# Patient Record
Sex: MALE | Race: BLACK OR AFRICAN AMERICAN | NOT HISPANIC OR LATINO | Employment: OTHER | URBAN - METROPOLITAN AREA
[De-identification: names, ages, dates, MRNs, and addresses within clinical notes are randomized per-mention and may not be internally consistent; named-entity substitution may affect disease eponyms.]

---

## 2017-01-03 ENCOUNTER — APPOINTMENT (OUTPATIENT)
Dept: RADIATION ONCOLOGY | Facility: HOSPITAL | Age: 52
End: 2017-01-03
Attending: RADIOLOGY
Payer: COMMERCIAL

## 2017-01-03 PROCEDURE — 77336 RADIATION PHYSICS CONSULT: CPT | Performed by: RADIOLOGY

## 2017-01-03 PROCEDURE — 77385 HB NTSTY MODUL RAD TX DLVR SMPL: CPT | Performed by: RADIOLOGY

## 2017-01-04 PROCEDURE — 77385 HB NTSTY MODUL RAD TX DLVR SMPL: CPT | Performed by: RADIOLOGY

## 2017-01-05 PROCEDURE — 77385 HB NTSTY MODUL RAD TX DLVR SMPL: CPT | Performed by: RADIOLOGY

## 2017-01-05 PROCEDURE — 77417 THER RADIOLOGY PORT IMAGE(S): CPT | Performed by: RADIOLOGY

## 2017-01-06 PROCEDURE — 77385 HB NTSTY MODUL RAD TX DLVR SMPL: CPT | Performed by: RADIOLOGY

## 2017-01-09 PROCEDURE — 77336 RADIATION PHYSICS CONSULT: CPT | Performed by: RADIOLOGY

## 2017-01-09 PROCEDURE — 77385 HB NTSTY MODUL RAD TX DLVR SMPL: CPT | Performed by: RADIOLOGY

## 2017-01-23 ENCOUNTER — ALLSCRIPTS OFFICE VISIT (OUTPATIENT)
Dept: OTHER | Facility: OTHER | Age: 52
End: 2017-01-23

## 2017-01-23 DIAGNOSIS — I10 ESSENTIAL (PRIMARY) HYPERTENSION: ICD-10-CM

## 2017-01-23 DIAGNOSIS — E11.9 TYPE 2 DIABETES MELLITUS WITHOUT COMPLICATIONS (HCC): ICD-10-CM

## 2017-02-23 ENCOUNTER — GENERIC CONVERSION - ENCOUNTER (OUTPATIENT)
Dept: OTHER | Facility: OTHER | Age: 52
End: 2017-02-23

## 2017-03-02 ENCOUNTER — APPOINTMENT (OUTPATIENT)
Dept: RADIATION ONCOLOGY | Facility: HOSPITAL | Age: 52
End: 2017-03-02
Attending: RADIOLOGY
Payer: COMMERCIAL

## 2017-03-02 ENCOUNTER — GENERIC CONVERSION - ENCOUNTER (OUTPATIENT)
Dept: OTHER | Facility: OTHER | Age: 52
End: 2017-03-02

## 2017-03-02 DIAGNOSIS — C61 MALIGNANT NEOPLASM OF PROSTATE (HCC): ICD-10-CM

## 2017-03-02 PROCEDURE — 99213 OFFICE O/P EST LOW 20 MIN: CPT | Performed by: RADIOLOGY

## 2017-03-23 ENCOUNTER — APPOINTMENT (OUTPATIENT)
Dept: LAB | Facility: CLINIC | Age: 52
End: 2017-03-23
Payer: COMMERCIAL

## 2017-03-23 ENCOUNTER — ALLSCRIPTS OFFICE VISIT (OUTPATIENT)
Dept: OTHER | Facility: OTHER | Age: 52
End: 2017-03-23

## 2017-03-23 ENCOUNTER — TRANSCRIBE ORDERS (OUTPATIENT)
Dept: LAB | Facility: CLINIC | Age: 52
End: 2017-03-23

## 2017-03-23 DIAGNOSIS — E11.9 TYPE 2 DIABETES MELLITUS WITHOUT COMPLICATIONS (HCC): ICD-10-CM

## 2017-03-23 DIAGNOSIS — I10 ESSENTIAL (PRIMARY) HYPERTENSION: ICD-10-CM

## 2017-03-23 LAB
ANION GAP SERPL CALCULATED.3IONS-SCNC: 11 MMOL/L (ref 4–13)
BUN SERPL-MCNC: 21 MG/DL (ref 5–25)
CALCIUM SERPL-MCNC: 9.1 MG/DL (ref 8.3–10.1)
CHLORIDE SERPL-SCNC: 103 MMOL/L (ref 100–108)
CO2 SERPL-SCNC: 28 MMOL/L (ref 21–32)
CREAT SERPL-MCNC: 1.09 MG/DL (ref 0.6–1.3)
EST. AVERAGE GLUCOSE BLD GHB EST-MCNC: 154 MG/DL
GFR SERPL CREATININE-BSD FRML MDRD: >60 ML/MIN/1.73SQ M
GLUCOSE P FAST SERPL-MCNC: 100 MG/DL (ref 65–99)
HBA1C MFR BLD: 7 % (ref 4.2–6.3)
POTASSIUM SERPL-SCNC: 3.6 MMOL/L (ref 3.5–5.3)
SODIUM SERPL-SCNC: 142 MMOL/L (ref 136–145)

## 2017-03-23 PROCEDURE — 83036 HEMOGLOBIN GLYCOSYLATED A1C: CPT

## 2017-03-23 PROCEDURE — 36415 COLL VENOUS BLD VENIPUNCTURE: CPT

## 2017-03-23 PROCEDURE — 80048 BASIC METABOLIC PNL TOTAL CA: CPT

## 2017-03-24 ENCOUNTER — GENERIC CONVERSION - ENCOUNTER (OUTPATIENT)
Dept: OTHER | Facility: OTHER | Age: 52
End: 2017-03-24

## 2017-03-30 ENCOUNTER — GENERIC CONVERSION - ENCOUNTER (OUTPATIENT)
Dept: OTHER | Facility: OTHER | Age: 52
End: 2017-03-30

## 2017-04-05 ENCOUNTER — ALLSCRIPTS OFFICE VISIT (OUTPATIENT)
Dept: OTHER | Facility: OTHER | Age: 52
End: 2017-04-05

## 2017-04-05 LAB
CLARITY UR: NORMAL
COLOR UR: YELLOW
GLUCOSE (HISTORICAL): NORMAL
HGB UR QL STRIP.AUTO: NORMAL
KETONES UR STRIP-MCNC: NORMAL MG/DL
LEUKOCYTE ESTERASE UR QL STRIP: NORMAL
NITRITE UR QL STRIP: NORMAL
PH UR STRIP.AUTO: 5 [PH]
PROT UR STRIP-MCNC: NORMAL MG/DL
SP GR UR STRIP.AUTO: 1

## 2017-04-10 ENCOUNTER — APPOINTMENT (OUTPATIENT)
Dept: LAB | Facility: HOSPITAL | Age: 52
End: 2017-04-10
Payer: COMMERCIAL

## 2017-04-10 ENCOUNTER — TRANSCRIBE ORDERS (OUTPATIENT)
Dept: ADMINISTRATIVE | Facility: HOSPITAL | Age: 52
End: 2017-04-10

## 2017-04-10 DIAGNOSIS — K62.5 HEMORRHAGE OF RECTUM AND ANUS: ICD-10-CM

## 2017-04-10 DIAGNOSIS — K62.5 HEMORRHAGE OF RECTUM AND ANUS: Primary | ICD-10-CM

## 2017-04-10 PROCEDURE — 87338 HPYLORI STOOL AG IA: CPT

## 2017-04-11 LAB — H PYLORI AG STL QL IA: POSITIVE

## 2017-04-24 ENCOUNTER — GENERIC CONVERSION - ENCOUNTER (OUTPATIENT)
Dept: OTHER | Facility: OTHER | Age: 52
End: 2017-04-24

## 2017-05-04 ENCOUNTER — GENERIC CONVERSION - ENCOUNTER (OUTPATIENT)
Dept: OTHER | Facility: OTHER | Age: 52
End: 2017-05-04

## 2017-06-08 ENCOUNTER — ANESTHESIA EVENT (OUTPATIENT)
Dept: PERIOP | Facility: HOSPITAL | Age: 52
DRG: 621 | End: 2017-06-08
Payer: COMMERCIAL

## 2017-06-08 ENCOUNTER — ALLSCRIPTS OFFICE VISIT (OUTPATIENT)
Dept: OTHER | Facility: OTHER | Age: 52
End: 2017-06-08

## 2017-06-20 ENCOUNTER — ANESTHESIA (OUTPATIENT)
Dept: PERIOP | Facility: HOSPITAL | Age: 52
DRG: 621 | End: 2017-06-20
Payer: COMMERCIAL

## 2017-06-20 ENCOUNTER — HOSPITAL ENCOUNTER (INPATIENT)
Facility: HOSPITAL | Age: 52
LOS: 1 days | Discharge: HOME | DRG: 621 | End: 2017-06-21
Attending: SURGERY | Admitting: SURGERY
Payer: COMMERCIAL

## 2017-06-20 DIAGNOSIS — G47.33 OBSTRUCTIVE SLEEP APNEA: ICD-10-CM

## 2017-06-20 DIAGNOSIS — E66.01 MORBID (SEVERE) OBESITY DUE TO EXCESS CALORIES (HCC): ICD-10-CM

## 2017-06-20 LAB
GLUCOSE SERPL-MCNC: 170 MG/DL (ref 65–140)
GLUCOSE SERPL-MCNC: 90 MG/DL (ref 65–140)

## 2017-06-20 PROCEDURE — 88307 TISSUE EXAM BY PATHOLOGIST: CPT | Performed by: SURGERY

## 2017-06-20 PROCEDURE — 88342 IMHCHEM/IMCYTCHM 1ST ANTB: CPT | Performed by: SURGERY

## 2017-06-20 PROCEDURE — 82948 REAGENT STRIP/BLOOD GLUCOSE: CPT

## 2017-06-20 PROCEDURE — 0DB64Z3 EXCISION OF STOMACH, PERCUTANEOUS ENDOSCOPIC APPROACH, VERTICAL: ICD-10-PCS | Performed by: SURGERY

## 2017-06-20 RX ORDER — OXYCODONE HCL 5 MG/5 ML
10 SOLUTION, ORAL ORAL EVERY 4 HOURS PRN
Status: DISCONTINUED | OUTPATIENT
Start: 2017-06-20 | End: 2017-06-21 | Stop reason: HOSPADM

## 2017-06-20 RX ORDER — ONDANSETRON 2 MG/ML
4 INJECTION INTRAMUSCULAR; INTRAVENOUS ONCE AS NEEDED
Status: COMPLETED | OUTPATIENT
Start: 2017-06-20 | End: 2017-06-20

## 2017-06-20 RX ORDER — ROCURONIUM BROMIDE 10 MG/ML
INJECTION, SOLUTION INTRAVENOUS AS NEEDED
Status: DISCONTINUED | OUTPATIENT
Start: 2017-06-20 | End: 2017-06-20 | Stop reason: SURG

## 2017-06-20 RX ORDER — METOCLOPRAMIDE HYDROCHLORIDE 5 MG/ML
10 INJECTION INTRAMUSCULAR; INTRAVENOUS EVERY 6 HOURS SCHEDULED
Status: DISCONTINUED | OUTPATIENT
Start: 2017-06-20 | End: 2017-06-21 | Stop reason: HOSPADM

## 2017-06-20 RX ORDER — ACETAMINOPHEN 160 MG/5ML
325 SUSPENSION, ORAL (FINAL DOSE FORM) ORAL EVERY 4 HOURS PRN
Status: DISCONTINUED | OUTPATIENT
Start: 2017-06-20 | End: 2017-06-21 | Stop reason: HOSPADM

## 2017-06-20 RX ORDER — SODIUM CHLORIDE 9 MG/ML
125 INJECTION, SOLUTION INTRAVENOUS CONTINUOUS
Status: DISCONTINUED | OUTPATIENT
Start: 2017-06-20 | End: 2017-06-20 | Stop reason: HOSPADM

## 2017-06-20 RX ORDER — PROPOFOL 10 MG/ML
INJECTION, EMULSION INTRAVENOUS AS NEEDED
Status: DISCONTINUED | OUTPATIENT
Start: 2017-06-20 | End: 2017-06-20 | Stop reason: SURG

## 2017-06-20 RX ORDER — MAGNESIUM HYDROXIDE 1200 MG/15ML
LIQUID ORAL AS NEEDED
Status: DISCONTINUED | OUTPATIENT
Start: 2017-06-20 | End: 2017-06-20 | Stop reason: HOSPADM

## 2017-06-20 RX ORDER — GLYCOPYRROLATE 0.2 MG/ML
INJECTION INTRAMUSCULAR; INTRAVENOUS AS NEEDED
Status: DISCONTINUED | OUTPATIENT
Start: 2017-06-20 | End: 2017-06-20 | Stop reason: SURG

## 2017-06-20 RX ORDER — DEXAMETHASONE SODIUM PHOSPHATE 4 MG/ML
INJECTION, SOLUTION INTRA-ARTICULAR; INTRALESIONAL; INTRAMUSCULAR; INTRAVENOUS; SOFT TISSUE AS NEEDED
Status: DISCONTINUED | OUTPATIENT
Start: 2017-06-20 | End: 2017-06-20 | Stop reason: SURG

## 2017-06-20 RX ORDER — HYDROMORPHONE HYDROCHLORIDE 2 MG/ML
INJECTION, SOLUTION INTRAMUSCULAR; INTRAVENOUS; SUBCUTANEOUS AS NEEDED
Status: DISCONTINUED | OUTPATIENT
Start: 2017-06-20 | End: 2017-06-20 | Stop reason: SURG

## 2017-06-20 RX ORDER — SCOLOPAMINE TRANSDERMAL SYSTEM 1 MG/1
1 PATCH, EXTENDED RELEASE TRANSDERMAL ONCE AS NEEDED
Status: DISCONTINUED | OUTPATIENT
Start: 2017-06-20 | End: 2017-06-20

## 2017-06-20 RX ORDER — ONDANSETRON 2 MG/ML
INJECTION INTRAMUSCULAR; INTRAVENOUS AS NEEDED
Status: DISCONTINUED | OUTPATIENT
Start: 2017-06-20 | End: 2017-06-20 | Stop reason: SURG

## 2017-06-20 RX ORDER — PANTOPRAZOLE SODIUM 40 MG/1
40 INJECTION, POWDER, FOR SOLUTION INTRAVENOUS
Status: DISCONTINUED | OUTPATIENT
Start: 2017-06-21 | End: 2017-06-21 | Stop reason: HOSPADM

## 2017-06-20 RX ORDER — ONDANSETRON 2 MG/ML
4 INJECTION INTRAMUSCULAR; INTRAVENOUS EVERY 4 HOURS PRN
Status: DISCONTINUED | OUTPATIENT
Start: 2017-06-20 | End: 2017-06-21 | Stop reason: HOSPADM

## 2017-06-20 RX ORDER — TAMSULOSIN HYDROCHLORIDE 0.4 MG/1
0.4 CAPSULE ORAL
COMMUNITY
End: 2018-05-29 | Stop reason: SDUPTHER

## 2017-06-20 RX ORDER — BUPIVACAINE HYDROCHLORIDE AND EPINEPHRINE 5; 5 MG/ML; UG/ML
INJECTION, SOLUTION PERINEURAL AS NEEDED
Status: DISCONTINUED | OUTPATIENT
Start: 2017-06-20 | End: 2017-06-20 | Stop reason: HOSPADM

## 2017-06-20 RX ORDER — SODIUM CHLORIDE, SODIUM LACTATE, POTASSIUM CHLORIDE, CALCIUM CHLORIDE 600; 310; 30; 20 MG/100ML; MG/100ML; MG/100ML; MG/100ML
125 INJECTION, SOLUTION INTRAVENOUS CONTINUOUS
Status: DISCONTINUED | OUTPATIENT
Start: 2017-06-20 | End: 2017-06-21 | Stop reason: HOSPADM

## 2017-06-20 RX ORDER — ACETAMINOPHEN 160 MG/5ML
320 SUSPENSION, ORAL (FINAL DOSE FORM) ORAL EVERY 4 HOURS PRN
Status: DISCONTINUED | OUTPATIENT
Start: 2017-06-20 | End: 2017-06-21 | Stop reason: HOSPADM

## 2017-06-20 RX ORDER — MORPHINE SULFATE 4 MG/ML
4 INJECTION, SOLUTION INTRAMUSCULAR; INTRAVENOUS EVERY 2 HOUR PRN
Status: DISCONTINUED | OUTPATIENT
Start: 2017-06-20 | End: 2017-06-21 | Stop reason: HOSPADM

## 2017-06-20 RX ORDER — LANSOPRAZOLE 30 MG/1
30 CAPSULE, DELAYED RELEASE ORAL DAILY
COMMUNITY
End: 2017-06-21 | Stop reason: HOSPADM

## 2017-06-20 RX ORDER — MORPHINE SULFATE 2 MG/ML
2 INJECTION, SOLUTION INTRAMUSCULAR; INTRAVENOUS EVERY 2 HOUR PRN
Status: DISCONTINUED | OUTPATIENT
Start: 2017-06-20 | End: 2017-06-21 | Stop reason: HOSPADM

## 2017-06-20 RX ORDER — PROMETHAZINE HYDROCHLORIDE 25 MG/ML
25 INJECTION, SOLUTION INTRAMUSCULAR; INTRAVENOUS EVERY 6 HOURS PRN
Status: DISCONTINUED | OUTPATIENT
Start: 2017-06-20 | End: 2017-06-21 | Stop reason: HOSPADM

## 2017-06-20 RX ORDER — OXYCODONE HCL 5 MG/5 ML
5 SOLUTION, ORAL ORAL EVERY 4 HOURS PRN
Status: DISCONTINUED | OUTPATIENT
Start: 2017-06-20 | End: 2017-06-21 | Stop reason: HOSPADM

## 2017-06-20 RX ORDER — FENTANYL CITRATE 50 UG/ML
INJECTION, SOLUTION INTRAMUSCULAR; INTRAVENOUS AS NEEDED
Status: DISCONTINUED | OUTPATIENT
Start: 2017-06-20 | End: 2017-06-20 | Stop reason: SURG

## 2017-06-20 RX ADMIN — DEXAMETHASONE SODIUM PHOSPHATE 8 MG: 4 INJECTION, SOLUTION INTRAMUSCULAR; INTRAVENOUS at 12:47

## 2017-06-20 RX ADMIN — OXYCODONE HYDROCHLORIDE 10 MG: 5 SOLUTION ORAL at 23:15

## 2017-06-20 RX ADMIN — ONDANSETRON 4 MG: 2 INJECTION INTRAMUSCULAR; INTRAVENOUS at 20:10

## 2017-06-20 RX ADMIN — ROCURONIUM BROMIDE 10 MG: 10 INJECTION, SOLUTION INTRAVENOUS at 13:12

## 2017-06-20 RX ADMIN — TRIMETHOBENZAMIDE HYDROCHLORIDE 200 MG: 100 INJECTION INTRAMUSCULAR at 15:18

## 2017-06-20 RX ADMIN — FENTANYL CITRATE 50 MCG: 50 INJECTION, SOLUTION INTRAMUSCULAR; INTRAVENOUS at 13:06

## 2017-06-20 RX ADMIN — GLYCOPYRROLATE 0.6 MG: 0.2 INJECTION, SOLUTION INTRAMUSCULAR; INTRAVENOUS at 13:59

## 2017-06-20 RX ADMIN — HYDROMORPHONE HYDROCHLORIDE 0.5 MG: 2 INJECTION, SOLUTION INTRAMUSCULAR; INTRAVENOUS; SUBCUTANEOUS at 14:15

## 2017-06-20 RX ADMIN — LIDOCAINE HYDROCHLORIDE 100 MG: 20 INJECTION, SOLUTION INTRAVENOUS at 12:38

## 2017-06-20 RX ADMIN — SODIUM CHLORIDE: 0.9 INJECTION, SOLUTION INTRAVENOUS at 13:44

## 2017-06-20 RX ADMIN — HYDROMORPHONE HYDROCHLORIDE 0.5 MG: 2 INJECTION, SOLUTION INTRAMUSCULAR; INTRAVENOUS; SUBCUTANEOUS at 13:48

## 2017-06-20 RX ADMIN — HYDROMORPHONE HYDROCHLORIDE 0.5 MG: 1 INJECTION, SOLUTION INTRAMUSCULAR; INTRAVENOUS; SUBCUTANEOUS at 15:09

## 2017-06-20 RX ADMIN — SODIUM CHLORIDE 125 ML/HR: 0.9 INJECTION, SOLUTION INTRAVENOUS at 11:42

## 2017-06-20 RX ADMIN — ROCURONIUM BROMIDE 50 MG: 10 INJECTION, SOLUTION INTRAVENOUS at 12:38

## 2017-06-20 RX ADMIN — SCOPALAMINE 1 PATCH: 1 PATCH, EXTENDED RELEASE TRANSDERMAL at 11:42

## 2017-06-20 RX ADMIN — SODIUM CHLORIDE, SODIUM LACTATE, POTASSIUM CHLORIDE, AND CALCIUM CHLORIDE 125 ML/HR: .6; .31; .03; .02 INJECTION, SOLUTION INTRAVENOUS at 15:22

## 2017-06-20 RX ADMIN — PROPOFOL 200 MG: 10 INJECTION, EMULSION INTRAVENOUS at 12:38

## 2017-06-20 RX ADMIN — ACETAMINOPHEN 325 MG: 160 SUSPENSION ORAL at 23:47

## 2017-06-20 RX ADMIN — FENTANYL CITRATE 50 MCG: 50 INJECTION, SOLUTION INTRAMUSCULAR; INTRAVENOUS at 13:12

## 2017-06-20 RX ADMIN — NEOSTIGMINE METHYLSULFATE 4 MG: 1 INJECTION, SOLUTION INTRAMUSCULAR; INTRAVENOUS; SUBCUTANEOUS at 13:59

## 2017-06-20 RX ADMIN — MORPHINE SULFATE 4 MG: 4 INJECTION, SOLUTION INTRAMUSCULAR; INTRAVENOUS at 16:30

## 2017-06-20 RX ADMIN — FENTANYL CITRATE 100 MCG: 50 INJECTION, SOLUTION INTRAMUSCULAR; INTRAVENOUS at 12:38

## 2017-06-20 RX ADMIN — ONDANSETRON 4 MG: 2 INJECTION INTRAMUSCULAR; INTRAVENOUS at 14:53

## 2017-06-20 RX ADMIN — CEFAZOLIN SODIUM 2000 MG: 2 SOLUTION INTRAVENOUS at 12:47

## 2017-06-20 RX ADMIN — ENOXAPARIN SODIUM 40 MG: 40 INJECTION SUBCUTANEOUS at 12:01

## 2017-06-20 RX ADMIN — SODIUM CHLORIDE: 0.9 INJECTION, SOLUTION INTRAVENOUS at 13:05

## 2017-06-20 RX ADMIN — SODIUM CHLORIDE: 0.9 INJECTION, SOLUTION INTRAVENOUS at 14:20

## 2017-06-20 RX ADMIN — MORPHINE SULFATE 4 MG: 4 INJECTION, SOLUTION INTRAMUSCULAR; INTRAVENOUS at 20:17

## 2017-06-20 RX ADMIN — HYDROMORPHONE HYDROCHLORIDE 0.5 MG: 1 INJECTION, SOLUTION INTRAMUSCULAR; INTRAVENOUS; SUBCUTANEOUS at 14:38

## 2017-06-20 RX ADMIN — METOCLOPRAMIDE 10 MG: 5 INJECTION, SOLUTION INTRAMUSCULAR; INTRAVENOUS at 23:15

## 2017-06-20 RX ADMIN — ONDANSETRON HYDROCHLORIDE 4 MG: 2 INJECTION, SOLUTION INTRAVENOUS at 12:47

## 2017-06-20 RX ADMIN — HYDROMORPHONE HYDROCHLORIDE 0.5 MG: 1 INJECTION, SOLUTION INTRAMUSCULAR; INTRAVENOUS; SUBCUTANEOUS at 14:51

## 2017-06-20 RX ADMIN — PROMETHAZINE HYDROCHLORIDE 25 MG: 25 INJECTION INTRAMUSCULAR; INTRAVENOUS at 16:29

## 2017-06-21 VITALS
HEIGHT: 70 IN | BODY MASS INDEX: 42.56 KG/M2 | OXYGEN SATURATION: 95 % | WEIGHT: 297.3 LBS | SYSTOLIC BLOOD PRESSURE: 126 MMHG | DIASTOLIC BLOOD PRESSURE: 76 MMHG | RESPIRATION RATE: 16 BRPM | TEMPERATURE: 98.6 F | HEART RATE: 75 BPM

## 2017-06-21 PROBLEM — E66.9 OBESITY: Status: ACTIVE | Noted: 2017-06-21

## 2017-06-21 LAB
ANION GAP SERPL CALCULATED.3IONS-SCNC: 8 MMOL/L (ref 4–13)
BUN SERPL-MCNC: 10 MG/DL (ref 5–25)
CALCIUM SERPL-MCNC: 8.4 MG/DL (ref 8.3–10.1)
CHLORIDE SERPL-SCNC: 103 MMOL/L (ref 100–108)
CO2 SERPL-SCNC: 27 MMOL/L (ref 21–32)
CREAT SERPL-MCNC: 1.04 MG/DL (ref 0.6–1.3)
ERYTHROCYTE [DISTWIDTH] IN BLOOD BY AUTOMATED COUNT: 13.8 % (ref 11.6–15.1)
GFR SERPL CREATININE-BSD FRML MDRD: >60 ML/MIN/1.73SQ M
GLUCOSE SERPL-MCNC: 131 MG/DL (ref 65–140)
HCT VFR BLD AUTO: 39.5 % (ref 36.5–49.3)
HGB BLD-MCNC: 12.7 G/DL (ref 12–17)
MCH RBC QN AUTO: 28.7 PG (ref 26.8–34.3)
MCHC RBC AUTO-ENTMCNC: 32.2 G/DL (ref 31.4–37.4)
MCV RBC AUTO: 89 FL (ref 82–98)
PLATELET # BLD AUTO: 231 THOUSANDS/UL (ref 149–390)
PMV BLD AUTO: 10.8 FL (ref 8.9–12.7)
POTASSIUM SERPL-SCNC: 4.3 MMOL/L (ref 3.5–5.3)
RBC # BLD AUTO: 4.43 MILLION/UL (ref 3.88–5.62)
SODIUM SERPL-SCNC: 138 MMOL/L (ref 136–145)
WBC # BLD AUTO: 8.13 THOUSAND/UL (ref 4.31–10.16)

## 2017-06-21 PROCEDURE — 80048 BASIC METABOLIC PNL TOTAL CA: CPT | Performed by: SURGERY

## 2017-06-21 PROCEDURE — C9113 INJ PANTOPRAZOLE SODIUM, VIA: HCPCS | Performed by: SURGERY

## 2017-06-21 PROCEDURE — 85027 COMPLETE CBC AUTOMATED: CPT | Performed by: SURGERY

## 2017-06-21 RX ORDER — OMEPRAZOLE 20 MG/1
20 CAPSULE, DELAYED RELEASE ORAL DAILY
Qty: 30 CAPSULE | Refills: 0 | Status: SHIPPED | OUTPATIENT
Start: 2017-06-21 | End: 2019-04-04 | Stop reason: ALTCHOICE

## 2017-06-21 RX ORDER — OXYCODONE HYDROCHLORIDE AND ACETAMINOPHEN 5; 325 MG/1; MG/1
1 TABLET ORAL EVERY 4 HOURS PRN
Qty: 20 TABLET | Refills: 0
Start: 2017-06-21 | End: 2017-07-01

## 2017-06-21 RX ADMIN — SODIUM CHLORIDE, SODIUM LACTATE, POTASSIUM CHLORIDE, AND CALCIUM CHLORIDE 125 ML/HR: .6; .31; .03; .02 INJECTION, SOLUTION INTRAVENOUS at 09:16

## 2017-06-21 RX ADMIN — ENOXAPARIN SODIUM 40 MG: 40 INJECTION SUBCUTANEOUS at 09:00

## 2017-06-21 RX ADMIN — OXYCODONE HYDROCHLORIDE 10 MG: 5 SOLUTION ORAL at 03:25

## 2017-06-21 RX ADMIN — ACETAMINOPHEN 325 MG: 160 SUSPENSION ORAL at 03:48

## 2017-06-21 RX ADMIN — ACETAMINOPHEN 325 MG: 160 SUSPENSION ORAL at 09:01

## 2017-06-21 RX ADMIN — OXYCODONE HYDROCHLORIDE 5 MG: 5 SOLUTION ORAL at 09:01

## 2017-06-21 RX ADMIN — METOCLOPRAMIDE 10 MG: 5 INJECTION, SOLUTION INTRAMUSCULAR; INTRAVENOUS at 05:31

## 2017-06-21 RX ADMIN — PANTOPRAZOLE SODIUM 40 MG: 40 INJECTION, POWDER, FOR SOLUTION INTRAVENOUS at 09:02

## 2017-06-23 ENCOUNTER — GENERIC CONVERSION - ENCOUNTER (OUTPATIENT)
Dept: OTHER | Facility: OTHER | Age: 52
End: 2017-06-23

## 2017-06-29 ENCOUNTER — GENERIC CONVERSION - ENCOUNTER (OUTPATIENT)
Dept: OTHER | Facility: OTHER | Age: 52
End: 2017-06-29

## 2017-06-29 ENCOUNTER — ALLSCRIPTS OFFICE VISIT (OUTPATIENT)
Dept: OTHER | Facility: OTHER | Age: 52
End: 2017-06-29

## 2017-07-27 ENCOUNTER — ALLSCRIPTS OFFICE VISIT (OUTPATIENT)
Dept: OTHER | Facility: OTHER | Age: 52
End: 2017-07-27

## 2017-08-02 ENCOUNTER — GENERIC CONVERSION - ENCOUNTER (OUTPATIENT)
Dept: OTHER | Facility: OTHER | Age: 52
End: 2017-08-02

## 2017-10-03 ENCOUNTER — ALLSCRIPTS OFFICE VISIT (OUTPATIENT)
Dept: OTHER | Facility: OTHER | Age: 52
End: 2017-10-03

## 2017-10-03 DIAGNOSIS — K91.2 POSTSURGICAL MALABSORPTION, NOT ELSEWHERE CLASSIFIED (CODE): ICD-10-CM

## 2017-10-03 DIAGNOSIS — E11.9 TYPE 2 DIABETES MELLITUS WITHOUT COMPLICATIONS (HCC): ICD-10-CM

## 2017-10-03 DIAGNOSIS — Z98.84 BARIATRIC SURGERY STATUS: ICD-10-CM

## 2017-10-03 DIAGNOSIS — K21.9 GASTRO-ESOPHAGEAL REFLUX DISEASE WITHOUT ESOPHAGITIS: ICD-10-CM

## 2017-10-03 DIAGNOSIS — E66.9 OBESITY: ICD-10-CM

## 2017-10-03 DIAGNOSIS — E78.1 PURE HYPERGLYCERIDEMIA: ICD-10-CM

## 2017-10-03 DIAGNOSIS — A04.8 OTHER SPECIFIED BACTERIAL INTESTINAL INFECTIONS: ICD-10-CM

## 2017-10-03 DIAGNOSIS — I10 ESSENTIAL (PRIMARY) HYPERTENSION: ICD-10-CM

## 2017-10-04 ENCOUNTER — APPOINTMENT (OUTPATIENT)
Dept: LAB | Facility: HOSPITAL | Age: 52
End: 2017-10-04
Attending: UROLOGY
Payer: COMMERCIAL

## 2017-10-04 DIAGNOSIS — A04.8 OTHER SPECIFIED BACTERIAL INTESTINAL INFECTIONS: ICD-10-CM

## 2017-10-04 DIAGNOSIS — K91.2 POSTSURGICAL MALABSORPTION, NOT ELSEWHERE CLASSIFIED (CODE): ICD-10-CM

## 2017-10-04 DIAGNOSIS — K21.9 GASTRO-ESOPHAGEAL REFLUX DISEASE WITHOUT ESOPHAGITIS: ICD-10-CM

## 2017-10-04 DIAGNOSIS — E78.1 PURE HYPERGLYCERIDEMIA: ICD-10-CM

## 2017-10-04 DIAGNOSIS — Z98.84 BARIATRIC SURGERY STATUS: ICD-10-CM

## 2017-10-04 DIAGNOSIS — E11.9 TYPE 2 DIABETES MELLITUS WITHOUT COMPLICATIONS (HCC): ICD-10-CM

## 2017-10-04 DIAGNOSIS — I10 ESSENTIAL (PRIMARY) HYPERTENSION: ICD-10-CM

## 2017-10-04 DIAGNOSIS — C61 MALIGNANT NEOPLASM OF PROSTATE (HCC): ICD-10-CM

## 2017-10-04 DIAGNOSIS — E66.9 OBESITY: ICD-10-CM

## 2017-10-04 LAB
25(OH)D3 SERPL-MCNC: 28.5 NG/ML (ref 30–100)
ALBUMIN SERPL BCP-MCNC: 4.1 G/DL (ref 3.5–5)
ALP SERPL-CCNC: 46 U/L (ref 46–116)
ALT SERPL W P-5'-P-CCNC: 39 U/L (ref 12–78)
ANION GAP SERPL CALCULATED.3IONS-SCNC: 8 MMOL/L (ref 4–13)
AST SERPL W P-5'-P-CCNC: 21 U/L (ref 5–45)
BILIRUB SERPL-MCNC: 0.4 MG/DL (ref 0.2–1)
BUN SERPL-MCNC: 15 MG/DL (ref 5–25)
CALCIUM SERPL-MCNC: 9.1 MG/DL (ref 8.3–10.1)
CHLORIDE SERPL-SCNC: 108 MMOL/L (ref 100–108)
CHOLEST SERPL-MCNC: 131 MG/DL (ref 50–200)
CO2 SERPL-SCNC: 29 MMOL/L (ref 21–32)
CREAT SERPL-MCNC: 1.04 MG/DL (ref 0.6–1.3)
ERYTHROCYTE [DISTWIDTH] IN BLOOD BY AUTOMATED COUNT: 13.2 % (ref 11.6–15.1)
FERRITIN SERPL-MCNC: 86 NG/ML (ref 8–388)
FOLATE SERPL-MCNC: >20 NG/ML (ref 3.1–17.5)
GFR SERPL CREATININE-BSD FRML MDRD: 96 ML/MIN/1.73SQ M
GLUCOSE P FAST SERPL-MCNC: 92 MG/DL (ref 65–99)
HCT VFR BLD AUTO: 40.7 % (ref 36.5–49.3)
HDLC SERPL-MCNC: 47 MG/DL (ref 40–60)
HGB BLD-MCNC: 13.1 G/DL (ref 12–17)
LDLC SERPL CALC-MCNC: 73 MG/DL (ref 0–100)
MCH RBC QN AUTO: 28.1 PG (ref 26.8–34.3)
MCHC RBC AUTO-ENTMCNC: 32.2 G/DL (ref 31.4–37.4)
MCV RBC AUTO: 87 FL (ref 82–98)
PLATELET # BLD AUTO: 172 THOUSANDS/UL (ref 149–390)
PMV BLD AUTO: 11.2 FL (ref 8.9–12.7)
POTASSIUM SERPL-SCNC: 3.4 MMOL/L (ref 3.5–5.3)
PROT SERPL-MCNC: 7.8 G/DL (ref 6.4–8.2)
PSA SERPL-MCNC: 0.6 NG/ML (ref 0–4)
PTH-INTACT SERPL-MCNC: 23 PG/ML (ref 14–72)
RBC # BLD AUTO: 4.66 MILLION/UL (ref 3.88–5.62)
SODIUM SERPL-SCNC: 145 MMOL/L (ref 136–145)
TRIGL SERPL-MCNC: 54 MG/DL
VIT B12 SERPL-MCNC: 717 PG/ML (ref 100–900)
WBC # BLD AUTO: 2.54 THOUSAND/UL (ref 4.31–10.16)

## 2017-10-04 PROCEDURE — 84153 ASSAY OF PSA TOTAL: CPT

## 2017-10-04 PROCEDURE — 82728 ASSAY OF FERRITIN: CPT

## 2017-10-04 PROCEDURE — 82607 VITAMIN B-12: CPT

## 2017-10-04 PROCEDURE — 84590 ASSAY OF VITAMIN A: CPT

## 2017-10-04 PROCEDURE — 85027 COMPLETE CBC AUTOMATED: CPT

## 2017-10-04 PROCEDURE — 80061 LIPID PANEL: CPT

## 2017-10-04 PROCEDURE — 83970 ASSAY OF PARATHORMONE: CPT

## 2017-10-04 PROCEDURE — 84425 ASSAY OF VITAMIN B-1: CPT

## 2017-10-04 PROCEDURE — 80053 COMPREHEN METABOLIC PANEL: CPT

## 2017-10-04 PROCEDURE — 82306 VITAMIN D 25 HYDROXY: CPT

## 2017-10-04 PROCEDURE — 36415 COLL VENOUS BLD VENIPUNCTURE: CPT

## 2017-10-04 PROCEDURE — 82746 ASSAY OF FOLIC ACID SERUM: CPT

## 2017-10-04 NOTE — PROGRESS NOTES
Assessment  1  Status post gastric bypass for obesity (V45 86) (Z98 84)   2  Postgastrectomy malabsorption (579 3) (K91 2,Z90 3)   3  Obesity (BMI 30-39 9) (278 00) (E66 9)   4  Sleep apnea (780 57) (G47 30)   5  Hypertriglyceridemia (272 1) (E78 1)   6  Diabetes mellitus (250 00) (E11 9)   7  Benign essential hypertension (401 1) (I10)   8  Acid reflux (530 81) (K21 9)   9  Helicobacter pylori (H  pylori) infection (041 86) (A04 8)   10  Other constipation (564 09) (K59 09)    Plan  Acid reflux, Benign essential hypertension, Diabetes mellitus, Helicobacter pylori (H   pylori) infection, Hypertriglyceridemia, Obesity (BMI 30-39 9), Postgastrectomy  malabsorption, Status post gastric bypass for obesity    · (1) CBC/ PLT (NO DIFF); Status:Active; Requested CPH:[id number];    Perform:[facility]; OXZ:[id number]; Ordered; For:Acid reflux, Benign essential hypertension, Diabetes mellitus, Helicobacter pylori (H  pylori) infection, Hypertriglyceridemia, Obesity (BMI 30-39 9), Postgastrectomy malabsorption, Status post gastric bypass for obesity; Ordered By:Darleen Mchugh;   · (1) COMPREHENSIVE METABOLIC PANEL; Status:Active; Requested [id number]:[id number];    Perform:[facility]; FZF:[id number]; Ordered; For:Acid reflux, Benign essential hypertension, Diabetes mellitus, Helicobacter pylori (H  pylori) infection, Hypertriglyceridemia, Obesity (BMI 30-39 9), Postgastrectomy malabsorption, Status post gastric bypass for obesity; Ordered By:Darleen Mchugh;   · (1) FERRITIN; Status:Active; Requested [id number]:[id number];    Perform:[facility]; SWJ:[id number]; Ordered; For:Acid reflux, Benign essential hypertension, Diabetes mellitus, Helicobacter pylori (H  pylori) infection, Hypertriglyceridemia, Obesity (BMI 30-39 9), Postgastrectomy malabsorption, Status post gastric bypass for obesity; Ordered By:Darleen Mchugh;   · (1) FOLATE; Status:Active;  Requested [id number];    Perform:[facility]; Due:2018; Ordered; For:Acid reflux, Benign essential hypertension, Diabetes mellitus, Helicobacter pylori (H  pylori) infection, Hypertriglyceridemia, Obesity (BMI 30-39 9), Postgastrectomy malabsorption, Status post gastric bypass for obesity; Ordered By:Chely Mchugh;   · (1) HEMOGLOBIN A1C; Status:Active; Requested HTH:[id number];    Perform:[facility]; OAC:[id number]; Ordered; For:Acid reflux, Benign essential hypertension, Diabetes mellitus, Helicobacter pylori (H  pylori) infection, Hypertriglyceridemia, Obesity (BMI 30-39 9), Postgastrectomy malabsorption, Status post gastric bypass for obesity; Ordered By:Gianfranco Mchugh;   · (1) LIPID PANEL, FASTING; Status:Active; Requested THV:[id number];    Perform:[facility]; [id number]; Ordered; For:Acid reflux, Benign essential hypertension, Diabetes mellitus, Helicobacter pylori (H  pylori) infection, Hypertriglyceridemia, Obesity (BMI 30-39 9), Postgastrectomy malabsorption, Status post gastric bypass for obesity; Ordered By:Chely Mchugh;   · (1) PTH N-TERMINAL (INTACT); Status:Active; Requested ZKD:[id number];    Perform:[facility]; UIR:[id number]; Ordered; For:Acid reflux, Benign essential hypertension, Diabetes mellitus, Helicobacter pylori (H  pylori) infection, Hypertriglyceridemia, Obesity (BMI 30-39 9), Postgastrectomy malabsorption, Status post gastric bypass for obesity; Ordered By:Gianfranco Mchugh;   · (1) VITAMIN A; Status:Active; Requested [id number]:[id number];    Perform:[facility]; DRN:[id number]; Ordered; For:Acid reflux, Benign essential hypertension, Diabetes mellitus, Helicobacter pylori (H  pylori) infection, Hypertriglyceridemia, Obesity (BMI 30-39 9), Postgastrectomy malabsorption, Status post gastric bypass for obesity; Ordered By:Chely Mchugh;   · (1) VITAMIN B1, WHOLE BLOOD; Status:Active; Requested EJI:[id number];    Perform:[facility]; JANET:[id number]; Ordered; For:Acid reflux, Benign essential hypertension, Diabetes mellitus, Helicobacter pylori (H  pylori) infection, Hypertriglyceridemia, Obesity (BMI 30-39 9), Postgastrectomy malabsorption, Status post gastric bypass for obesity; Ordered By:An Mchugh;   · (1) VITAMIN B12; Status:Active; Requested XPS:[id number];    Perform:[facility]; C:[id number]; Ordered; For:Acid reflux, Benign essential hypertension, Diabetes mellitus, Helicobacter pylori (H  pylori) infection, Hypertriglyceridemia, Obesity (BMI 30-39 9), Postgastrectomy malabsorption, Status post gastric bypass for obesity; Ordered By:An Mchugh;   · (1) VITAMIN D 25-HYDROXY; Status:Active; Requested ZXK:[id number];    Perform:[facility]; YXK:[id number]; Ordered; For:Acid reflux, Benign essential hypertension, Diabetes mellitus, Helicobacter pylori (H  pylori) infection, Hypertriglyceridemia, Obesity (BMI 30-39 9), Postgastrectomy malabsorption, Status post gastric bypass for obesity; Ordered By:An Mchugh;    Discussion/Summary    Follow up in 3 months  Follow diet as discussed  Get lab work done prior to your next office visit  Follow vitamin/mineral recommendations as reviewed with you  Exercise as tolerated  our office if you have any problems with abdominal pain especially if associated with fever, chills, nausea, vomiting, or any other concerns  lap sleeve gastrectomy-year old male Status post laparoscopic sleeve gastrectomy by Dr Patricia Armstrong  is here for routine follow-up   He is complaining of some intermittent intolerance to certain proteins-Ie; can tolerate chicken some days but not others-advised on moist food preparation for proteinseating a regular dieteating at least 70 grams of protein per day30/60 minute rule with liquidsat least 64 ounces of fluid per dayexercising regularly by walking-using cane for ambulation  malabsorption-Malabsorption- pateint is at risk for malabsorption of vitamins/minerals secondary to malabsorption from her procedure and restriction of intakescurrent supplements and advised on sameis taking bariatric vitamins- ? celebrate 2 MVI and 3 calcium per day is due for routine labs-ordered same now   s/p h pylori-he completed antibiotic course for this  He wanted testing for irradication and I advised on stool study but he declined at present  DM2- reports recent hgA1c of 5%- diet controlled dm now/advised he does not need hgA1c now if this was recently checked  HTN- remains on antihypertensive medication- bp was high by MA reading and did come down with repeat level but still borderline high  on symptoms of hypotension as well -continued weight loss may bring bp down  Advised if he becomes symptomatic with low bp then to follow-up with PCP as medication may need adjustments as well  Hyperlpidemia- high TG and low HDL by pre-op lipids-may correct with weight loss/exercise as tolerated  sleep apnea- he stopped using his Cpap-he notes he doesn't feel different with or without it during the day but notes when he used it he felt he could sleep through the night  I advised him that continued use for up to one year post-op may have a beneficial effect on long-term weight loss and at that point we would advise to have a repeat sleep study unless advised otherwise by sleep medicine  GERD-denies heart burn-just c/o some gas -advised this is to be expected and can try OTC gas-x per package directions as needed  constipation- notes he has been taking stool softener as needed and is having one bowel movement daily-advised that this is generally good  If he needs it, he can take stool softener or MIRALAX daily  The patient has the current Goals: Continued weight loss with good nutrition intakesvitamin/mineral levelsas toleratedThe patent has the current Barriers: None identified  Patient is able to Self-Care  Educational resources provided: N/a     Possible side effects of new medications were reviewed with the patient/guardian today  The treatment plan was reviewed with the patient/guardian  The patient/guardian understands and agrees with the treatment plan   He was advised to follow up due to malabsorption risks  Self Referrals: No      Chief Complaint  Patient said he tolerates diet well, exercises regularly and takes vitamins daily  Post-Op  Post-Op Bariatric Surgery:   PINKY TANG is status post lap sleeve procedure,-performed on 6/20/2017-  by Dr Zoila Voss  HPI: today's weight is 257 5 lb pounds,-today's BMI is 36 9-and-his total weight loss is 42% excess body weight loss pounds  The patient reports constipation, but-no nausea,-no vomiting,-no diarrhea,-no chest pain-and-no abdominal pain  Diet and Exercise: Diet history reviewed and discussed with the patient  Weight loss/gains to date discussed with the patient  Supplements: multivitamins-and-calcium  PE:   Abdominal exam: soft-and-no incisional hernia  Assessment:   Post-op, the patient is doing well  Plan: Activity restrictions: None  Instructions / Recommendations: recommended a daily protein intake of  grams,-vitamin supplement(s) recommended,-mineral supplement(s) recommended,-recommended exercising at least 150 minutes per week,-diet as discussed-and-instructed to call the office for concerns, questions, or problems  The patient was instructed to follow up in 3 months  Review of Systems    Constitutional: planned weight loss, but-not feeling poorly  Cardiovascular: no chest pain-and-no palpitations  Respiratory: no shortness of breath-and-no wheezing  Gastrointestinal: constipation, but-no abdominal pain,-no nausea,-no vomiting-and-no diarrhea  Psychiatric: no anxiety-and-no depression  Active Problems  1  Acid reflux (530 81) (K21 9)   2  Arthritis (716 90) (M19 90)   3  Asthma (493 90) (J45 909)   4  Benign essential hypertension (401 1) (I10)   5  Blood tests prior to treatment or procedure (V72 63) (Z01 812)   6   Bowel disease (569 9) (K63 9)   7  Diabetes mellitus (250 00) (E11 9)   8  Helicobacter pylori (H  pylori) infection (041 86) (A04 8)   9  History of back problems   10  Hypertriglyceridemia (272 1) (E78 1)   11  Morbid obesity (278 01) (E66 01)   12  Postgastrectomy malabsorption (579 3) (K91 2,Z90 3)   13  Prostate cancer (185) (C61)   14  Sleep apnea (780 57) (G47 30)   15  Status post gastric bypass for obesity (V45 86) (Z98 84)    Social History   · Denied: History of Alcohol use   · Assistive Devices: Cane   ·    · Former smoker (Q79 31) (R86 445)   · No alcohol use   · No drug use  The social history was reviewed and updated today  Current Meds   1  Albuterol-Ipratropium 103-18 MCG/ACT AERO; INHALE 2 PUFFS Every 6 hours PRN; Therapy: (Recorded:67Xax5987) to Recorded   2  B Complex TABS; Therapy: (Recorded:31Oct2016) to Recorded   3  Calcium CHEW;   Therapy: (Mima Pardo) to Recorded   4  Lisinopril 10 MG Oral Tablet; TAKE 1 TABLET DAILY; Therapy: (Mannie Lopez) to Recorded   5  Multiple Vitamin CHEW;   Therapy: (Recorded:29Jun2017) to Recorded   6  Singulair 10 MG Oral Tablet; Therapy: (Recorded:31Oct2016) to Recorded   7  Spiriva HandiHaler 18 MCG Inhalation Capsule; INHALE CONTENTS OF 1 CAPSULE   ONCE DAILY; Therapy: (Mannie Lopez) to Recorded   8  Tamsulosin HCl - 0 4 MG Oral Capsule; take 1 capsule daily; Therapy: 08HVL8325 to (Evaluate:92Fib4550)  Requested for: 32AMC8845; Last   Rx:[id number] Ordered   9  Tricor TABS; TAKE 145 MG Daily; Therapy: (Mannie Lopez) to Recorded   10  Vitamin C CAPS; Therapy: (Recorded:31Oct2016) to Recorded    The medication list was reviewed and updated today  Allergies  1  No Known Drug Allergies  2   Seasonal    Vitals   Recorded: 78PAT2222 11:18AM Recorded: 76AYQ7249 10:35AM   Temperature  97 4 F   Heart Rate  82   Respiration  15   Systolic 064,    Diastolic 86, LUE 90   Height  5 ft 10 in   Weight  257 lb 0 5 oz BMI Calculated  36 88   BSA Calculated  2 32     Physical Exam    Constitutional   General appearance: No acute distress, well appearing and well nourished  Eyes bilateral conjunctiva without pallor  Ears, Nose, Mouth, and Throat mucous membranes moist    Pulmonary   Respiratory effort: No increased work of breathing or signs of respiratory distress  Auscultation of lungs: Clear to auscultation, equal breath sounds bilaterally, no wheezes, no rales, no rhonci  Cardiovascular   Auscultation of heart: Normal rate and rhythm, normal S1 and S2, without murmurs  Abdomen soft, no incisional hernias appreciated     Musculoskeletal   Gait and station: Normal     Psychiatric   Orientation to person, place and time: Normal     Mood and affect: Normal          Future Appointments    Date/Time Provider Specialty Site   11/20/2017 10:15 AM Marlene Urias [facility] [place]   01/11/2018 10:30 AM Payal Mchugh, [address] [facility]     Signatures   Electronically signed by : Basilia Schafer [facility]; Oct  3 2017 11:34AM EST                       (Author)    Electronically signed by : ZEINAB Pickett ; Oct  3 2017  2:26PM EST                       (Co-author)

## 2017-10-05 DIAGNOSIS — C61 MALIGNANT NEOPLASM OF PROSTATE (HCC): ICD-10-CM

## 2017-10-06 LAB — VIT A SERPL-MCNC: 68 UG/DL (ref 24–85)

## 2017-10-09 LAB — VIT B1 BLD-SCNC: 146.1 NMOL/L (ref 66.5–200)

## 2017-10-12 ENCOUNTER — GENERIC CONVERSION - ENCOUNTER (OUTPATIENT)
Dept: OTHER | Facility: OTHER | Age: 52
End: 2017-10-12

## 2017-10-16 ENCOUNTER — GENERIC CONVERSION - ENCOUNTER (OUTPATIENT)
Dept: OTHER | Facility: OTHER | Age: 52
End: 2017-10-16

## 2017-10-16 ENCOUNTER — APPOINTMENT (OUTPATIENT)
Dept: RADIATION ONCOLOGY | Facility: HOSPITAL | Age: 52
End: 2017-10-16
Attending: RADIOLOGY
Payer: COMMERCIAL

## 2017-10-16 PROCEDURE — 99214 OFFICE O/P EST MOD 30 MIN: CPT | Performed by: RADIOLOGY

## 2017-10-19 ENCOUNTER — GENERIC CONVERSION - ENCOUNTER (OUTPATIENT)
Dept: OTHER | Facility: OTHER | Age: 52
End: 2017-10-19

## 2017-11-20 ENCOUNTER — ALLSCRIPTS OFFICE VISIT (OUTPATIENT)
Dept: OTHER | Facility: OTHER | Age: 52
End: 2017-11-20

## 2018-01-09 NOTE — RESULT NOTES
Message   Potassium under normal range, A1c 7% preoperative labs complete for bariatric surgery     Verified Results  (1) HEMOGLOBIN A1C 23Mar2017 11:56AM Kye Alejandre Order Number: [id number]     Test Name Result Flag Reference   HEMOGLOBIN A1C 7 0 % H 4 2-6 3   EST  AVG  GLUCOSE 154 mg/dl       (1) BASIC METABOLIC PROFILE 98GUU5536 11:56AM Arely Almeida    Order Number: [id number]     Test Name Result Flag Reference   SODIUM 142 mmol/L  136-145   POTASSIUM 3 6 mmol/L  3 5-5 3   CHLORIDE 103 mmol/L  100-108   CARBON DIOXIDE 28 mmol/L  21-32   ANION GAP (CALC) 11 mmol/L  4-13   BLOOD UREA NITROGEN 21 mg/dL  5-25   CREATININE 1 09 mg/dL  0 60-1 30   Standardized to IDMS reference method   CALCIUM 9 1 mg/dL  8 3-10 1   eGFR Non-African American      >60 0 ml/min/1 73sq m   [facility] [employer] Disease Education Program recommendations are as follows:  GFR calculation is accurate only with a steady state creatinine  Chronic Kidney disease less than 60 ml/min/1 73 sq  meters  Kidney failure less than 15 ml/min/1 73 sq  meters     GLUCOSE FASTING 100 mg/dL H 65-99

## 2018-01-11 ENCOUNTER — ALLSCRIPTS OFFICE VISIT (OUTPATIENT)
Dept: OTHER | Facility: OTHER | Age: 53
End: 2018-01-11

## 2018-01-11 DIAGNOSIS — A04.8 OTHER SPECIFIED BACTERIAL INTESTINAL INFECTIONS: ICD-10-CM

## 2018-01-11 DIAGNOSIS — K91.2 POSTSURGICAL MALABSORPTION, NOT ELSEWHERE CLASSIFIED (CODE): ICD-10-CM

## 2018-01-11 DIAGNOSIS — R51.9 HEADACHE: ICD-10-CM

## 2018-01-11 DIAGNOSIS — Z98.84 BARIATRIC SURGERY STATUS: ICD-10-CM

## 2018-01-11 DIAGNOSIS — R41.3 OTHER AMNESIA: ICD-10-CM

## 2018-01-11 NOTE — MISCELLANEOUS
Message  Called pt to let him know his medication was sent in to the pharmacy  Dr Vazquez Villarreal had made him aware of the need for this at his first postop  [place] siad he is [place] but will [place] it [place] when he gets home  Active Problems    1  Acid reflux (530 81) (K21 9)   2  Arthritis (716 90) (M19 90)   3  Asthma (493 90) (J45 909)   4  Benign essential hypertension (401 1) (I10)   5  Blood tests prior to treatment or procedure (V72 63) (Z01 812)   6  Bowel disease (569 9) (K63 9)   7  Diabetes mellitus (250 00) (E11 9)   8  Helicobacter pylori (H  pylori) infection (041 86) (A04 8)   9  History of back problems   10  Hypertriglyceridemia (272 1) (E78 1)   11  Morbid obesity (278 01) (E66 01)   12  Postgastrectomy malabsorption (579 3) (K91 2,Z90 3)   13  Prostate cancer (185) (C61)   14  Sleep apnea (780 57) (G47 30)   15  Status post gastric bypass for obesity (V45 86) (Z98 84)    Current Meds   1  Albuterol-Ipratropium 103-18 MCG/ACT AERO; INHALE 2 PUFFS Every 6 hours PRN; Therapy: (Recorded:73Evm8684) to Recorded   2  B Complex TABS; Therapy: (Recorded:31Oct2016) to Recorded   3  Calcium CHEW;   Therapy: (Craig Wren) to Recorded   4  Enoxaparin Sodium 40 MG/0 4ML Subcutaneous Solution (Lovenox); inject once daily for   2 weeks post surgery; Therapy: 42QQD1649 to (Last Rx:15Jun2017)  Requested for: 27Jun2017 Ordered   5  Lisinopril 10 MG Oral Tablet; TAKE 1 TABLET DAILY; Therapy: (Aurora Márquez) to Recorded   6  Multiple Vitamin CHEW;   Therapy: (Recorded:29Jun2017) to Recorded   7  Omeprazole 40 MG Oral Capsule Delayed Release; TAKE 1 CAPSULE DAILY; Therapy: (Aurora Márquez) to Recorded   8  Singulair 10 MG Oral Tablet (Montelukast Sodium); Therapy: (Recorded:31Oct2016) to Recorded   9  Spiriva HandiHaler 18 MCG Inhalation Capsule; INHALE CONTENTS OF 1 CAPSULE   ONCE DAILY; Therapy: (Benna Márquez) to Recorded   10   Tamsulosin HCl - 0 4 MG Oral Capsule (Flomax); take 1 capsule daily; Therapy: 53WQC0318 to (Evaluate:53Kew8682)  Requested for: 48LTV8983; Last    Rx:[id number] Ordered   11  Tricor TABS (Fenofibrate); TAKE 145 MG Daily; Therapy: (Deven Prey) to Recorded   12  Vitamin C CAPS; Therapy: (Recorded:31Oct2016) to Recorded    Allergies    1  No Known Drug Allergies    2  Seasonal    Plan  Helicobacter pylori (H  pylori) infection    · Amoxicillin 500 MG Oral Capsule; TAKE 2 CAPSULE 2 TIMES DAILY UNTIL GONE  FOR 14DAYS   · Clarithromycin 500 MG Oral Tablet; TAKE 1 TABLET TWICE DAILY UNTIL  FINISHED   · Omeprazole 20 MG Oral Capsule Delayed Release; TAKE 1 CAPSULE TWICE  DAILY    Signatures   Electronically signed by : Alyssia Rodas LPN;  Aug  2 2017  2:12PM EST                       (Author)

## 2018-01-11 NOTE — PROGRESS NOTES
Message  Reviewed post-operative pureed and soft foods diet with pt  Discussed gradual diet advancement and portion size progression  Discussed adequate hydration, signs and symptoms of dehydration  Discussed recommended post-operative vitamin supplementation and protein supplements  Discussed importance of regular physical activity  Provided pt with contact information for future questions/concerns  Active Problems    1  Acid reflux (530 81) (K21 9)   2  Arthritis (716 90) (M19 90)   3  Asthma (493 90) (J45 909)   4  Benign essential hypertension (401 1) (I10)   5  Blood tests prior to treatment or procedure (V72 63) (Z01 812)   6  Bowel disease (569 9) (K63 9)   7  Diabetes mellitus (250 00) (E11 9)   8  History of back problems   9  Hypertriglyceridemia (272 1) (E78 1)   10  Morbid obesity (278 01) (E66 01)   11  Postgastrectomy malabsorption (579 3) (K91 2,Z90 3)   12  Prostate cancer (185) (C61)   13  Sleep apnea (780 57) (G47 30)   14  Status post gastric bypass for obesity (V45 86) (Z98 84)    Current Meds   1  Albuterol-Ipratropium 103-18 MCG/ACT AERO; INHALE 2 PUFFS Every 6 hours PRN; Therapy: (Recorded:54Rzb3919) to Recorded   2  B Complex TABS; Therapy: (Recorded:31Oct2016) to Recorded   3  Calcium CHEW;   Therapy: (Tonya Dhaliwal) to Recorded   4  Enoxaparin Sodium 40 MG/0 4ML Subcutaneous Solution (Lovenox); inject once daily for   2 weeks post surgery; Therapy: 98KTB7123 to (Last Rx:15Jun2017)  Requested for: 27Jun2017 Ordered   5  Lisinopril 10 MG Oral Tablet; TAKE 1 TABLET DAILY; Therapy: (Juan Pablo Liu) to Recorded   6  Multiple Vitamin CHEW;   Therapy: (Recorded:29Jun2017) to Recorded   7  Omeprazole 40 MG Oral Capsule Delayed Release; TAKE 1 CAPSULE DAILY; Therapy: (Juan Pablo Liu) to Recorded   8  Oxycodone-Acetaminophen 5-325 MG Oral Tablet (Percocet); TAKE 1 TABLET EVERY 4   TO 6 HOURS AS NEEDED FOR PAIN;   Therapy: 29XLV0235 to (Evaluate:10Jun2017);  Last Rx:08Syu8152 Ordered   9  Singulair 10 MG Oral Tablet (Montelukast Sodium); Therapy: (Recorded:31Oct2016) to Recorded   10  Spiriva HandiHaler 18 MCG Inhalation Capsule; INHALE CONTENTS OF 1 CAPSULE    ONCE DAILY; Therapy: (Yossi Sizer) to Recorded   11  Tamsulosin HCl - 0 4 MG Oral Capsule (Flomax); take 1 capsule daily; Therapy: 56SRY0592 to (Evaluate:36Zqv2244)  Requested for: 11KXJ5705; Last    Rx:[id number] Ordered   12  Tricor TABS (Fenofibrate); TAKE 145 MG Daily; Therapy: (Yossi Sizer) to Recorded   13  Vitamin C CAPS; Therapy: (Recorded:31Oct2016) to Recorded    Allergies    1  No Known Drug Allergies    2   Seasonal    Signatures   Electronically signed by : CAIT Wu; Jun 29 2017 11:30AM EST                       (Author)

## 2018-01-12 ENCOUNTER — TRANSCRIBE ORDERS (OUTPATIENT)
Dept: ADMINISTRATIVE | Facility: HOSPITAL | Age: 53
End: 2018-01-12

## 2018-01-12 ENCOUNTER — ALLSCRIPTS OFFICE VISIT (OUTPATIENT)
Dept: OTHER | Facility: OTHER | Age: 53
End: 2018-01-12

## 2018-01-12 VITALS
WEIGHT: 285 LBS | HEIGHT: 69 IN | HEART RATE: 58 BPM | TEMPERATURE: 98.3 F | DIASTOLIC BLOOD PRESSURE: 96 MMHG | RESPIRATION RATE: 15 BRPM | BODY MASS INDEX: 42.21 KG/M2 | SYSTOLIC BLOOD PRESSURE: 138 MMHG

## 2018-01-12 VITALS
HEIGHT: 70 IN | WEIGHT: 255.25 LBS | TEMPERATURE: 98 F | HEART RATE: 58 BPM | DIASTOLIC BLOOD PRESSURE: 70 MMHG | BODY MASS INDEX: 36.54 KG/M2 | OXYGEN SATURATION: 98 % | RESPIRATION RATE: 16 BRPM | SYSTOLIC BLOOD PRESSURE: 128 MMHG

## 2018-01-12 VITALS
BODY MASS INDEX: 41.59 KG/M2 | WEIGHT: 290.5 LBS | DIASTOLIC BLOOD PRESSURE: 70 MMHG | HEART RATE: 74 BPM | RESPIRATION RATE: 22 BRPM | TEMPERATURE: 98.2 F | HEIGHT: 70 IN | SYSTOLIC BLOOD PRESSURE: 102 MMHG

## 2018-01-12 VITALS
RESPIRATION RATE: 20 BRPM | HEART RATE: 80 BPM | WEIGHT: 315 LBS | TEMPERATURE: 98.2 F | DIASTOLIC BLOOD PRESSURE: 64 MMHG | BODY MASS INDEX: 45.1 KG/M2 | SYSTOLIC BLOOD PRESSURE: 112 MMHG | HEIGHT: 70 IN

## 2018-01-12 VITALS
DIASTOLIC BLOOD PRESSURE: 80 MMHG | BODY MASS INDEX: 35.22 KG/M2 | HEART RATE: 56 BPM | WEIGHT: 246 LBS | SYSTOLIC BLOOD PRESSURE: 116 MMHG | HEIGHT: 70 IN

## 2018-01-12 DIAGNOSIS — R41.3 MEMORY LOSS: Primary | ICD-10-CM

## 2018-01-12 NOTE — PROGRESS NOTES
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  Pt has accomplished the following goals: He is following the 30/60 rule He is eating off of a 9 inch plate He is taking 15 to 20 minutes to eat Nutrition Diagnosis: Obesity related to sedentary lifestyle and excess energy intake as evidenced by BMI Intervention: Reviewed work flow  He has cardiology appointment scheduled for May 3rd with his own cardiologist out of network  Encouraged patient to keep a written food journla to track his food intake  Encouraged completion of lesson plan number 6 in bariatric booklet  Pt was receptive and verbalized understanding  Pt will work on the following goals: Activity /exercise goal: 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 day and coaching basketball Continue to practice the 30/60 rule, eating off of a 9 inch plate, eating slowly Keep a written food journal Monitoring/evaluation: Notified  to submit to insurance Patient is aware that he will be weighed at his final H & P and that he will be required to follow the liver shrinking diet for 2 weeks prior to surgery  He will continue to focus on weight loss  Active Problems    1  Acid reflux (530 81) (K21 9)   2  Arthritis (716 90) (M19 90)   3  Asthma (493 90) (J45 909)   4  Benign essential hypertension (401 1) (I10)   5  Blood tests prior to treatment or procedure (V72 63) (Z01 812)   6  Bowel disease (569 9) (K63 9)   7  Diabetes mellitus (250 00) (E11 9)   8  History of back problems   9  Hypertriglyceridemia (272 1) (E78 1)   10  Morbid obesity (278 01) (E66 01)   11  Prostate cancer (185) (C61)   12  Sleep apnea (780 57) (G47 30)    Surgical History    1  History of Laparoscopy (Diagnostic)   2   History of Prostate Place Interstitial Dev For Radiation Guide Multiple    Family History  Mother    1  Family history of hypertension (V17 49) (Z82 49)  Father    2  Family history of gout (V18 19) (Z82 69)   3  Family history of malignant neoplasm of prostate (V16 42) (Z80 42)    Social History    · Denied: History of Alcohol use   · Assistive Devices: Cane   ·    · Former smoker (C13 47) (C13 844)   · No alcohol use   · No drug use    Current Meds   1  Albuterol-Ipratropium 103-18 MCG/ACT AERO; INHALE 2 PUFFS Every 6 hours PRN; Therapy: (Recorded:60Uwv5693) to Recorded   2  B Complex TABS; Therapy: (Recorded:31Oct2016) to Recorded   3  HydroCHLOROthiazide 25 MG Oral Tablet; TAKE 1 TABLET DAILY; Therapy: (Silvio Patel) to Recorded   4  Inositol 500 MG Oral Tablet; Therapy: (Recorded:31Oct2016) to Recorded   5  Lansoprazole 30 MG TBDP; Therapy: (Recorded:31Oct2016) to Recorded   6  Lisinopril 10 MG Oral Tablet; TAKE 1 TABLET DAILY; Therapy: (Silvio Patel) to Recorded   7  MetFORMIN HCl - 1000 MG Oral Tablet; TAKE 1 TABLET TWICE DAILY WITH MEALS; Therapy: (Silvio Patel) to Recorded   8  Omeprazole 40 MG Oral Capsule Delayed Release; TAKE 1 CAPSULE DAILY; Therapy: (Silvio Patel) to Recorded   9  Singulair 10 MG Oral Tablet (Montelukast Sodium); Therapy: (Recorded:31Oct2016) to Recorded   10  Spiriva HandiHaler 18 MCG Inhalation Capsule; INHALE CONTENTS OF 1 CAPSULE    ONCE DAILY; Therapy: (Silvio Patel) to Recorded   11  Tamsulosin HCl - 0 4 MG Oral Capsule (Flomax); take 1 capsule daily; Therapy: 75PVK9181 to (Evaluate:46Liv5752)  Requested for: 54FOH2241; Last    Rx:[id number] Ordered   12  Tricor TABS (Fenofibrate); TAKE 145 MG Daily; Therapy: (Silvio Patel) to Recorded   13  Vitamin C CAPS; Therapy: (Recorded:31Oct2016) to Recorded    Allergies    1  No Known Drug Allergies    2   Seasonal    Vitals  Signs   Recorded: 24Apr2017 03:30PM   Height: 5 ft 10 in  Weight: 313 lb 8 0 oz  BMI Calculated: 44 98  BSA Calculated: 2 53    Future Appointments    Date/Time Provider Specialty Site   10/05/2017 09:15 AM Maurice Gallegos Urology [facility] FOR UROLOGY Rach Khan     Signatures   Electronically signed by : CAIT Durham; Apr 24 2017  3:31PM EST                       (Author)    Electronically signed by :  ZEINAB Castro ; May  4 2017 12:04PM EST

## 2018-01-12 NOTE — PROGRESS NOTES
Discussion/Summary  Discussion Summary:   Assess: Pt here for monthly weigh in  Pt gained 2 pounds  Pt had an upper respiratory infection and was on steroids due to an asthma flare up  His radiation treatments are completed  He had an EGD and colonoscopy done in January and was treated for h pylori  He is trying to practice the 30/60 rule  He was not comfortable using myfitnesspal  His son has offered to let him use his watch to track his steps  He generally eats 3 meals per day, frequently on the go with his 5 children and helps  some of their sporting events  Nutrition Diagnosis: Obesity related to sedentary lifestyle and excess energy intake as evidenced by BMI Intervention: Reviewed work flow  Patient plans to attend support group in April  He will f/u with his cardiologist for clearance and have his EGD results faxed to the office  Encouraged completion of lesson plan number 2-4 in bariatric booklet  Pt was receptive and verbalized understanding  Pt will work on the following goals: Eat off of a smaller plate Practice the 35/86 rule Time his meals Track his steps with his son's phone Monitoring/evaluation: Pt will lose 2 to 4 pounds by next appointment Follow up scheduled for : one month with program bariatrician  Active Problems    1  Acid reflux (530 81) (K21 9)   2  Arthritis (716 90) (M19 90)   3  Asthma (493 90) (J45 909)   4  Benign essential hypertension (401 1) (I10)   5  Blood tests prior to treatment or procedure (V72 63) (Z01 812)   6  Bowel disease (569 9) (K63 9)   7  Diabetes mellitus (250 00) (E11 9)   8  History of back problems   9  Hypertriglyceridemia (272 1) (E78 1)   10  Morbid obesity (278 01) (E66 01)   11  Prostate cancer (185) (C61)   12  Sleep apnea (780 57) (G47 30)    Surgical History    1  History of Laparoscopy (Diagnostic)   2  History of Prostate Place Interstitial Dev For Radiation Guide Multiple    Family History  Mother    1   Family history of hypertension (V17 49) (Z82 49)  Father    2  Family history of gout (V18 19) (Z82 69)   3  Family history of malignant neoplasm of prostate (V16 42) (Z80 42)    Social History    · Denied: History of Alcohol use   · Assistive Devices: Cane   ·    · Former smoker (X72 87) (T27 165)   · No alcohol use   · No drug use    Current Meds   1  Albuterol-Ipratropium 103-18 MCG/ACT AERO; INHALE 2 PUFFS Every 6 hours PRN; Therapy: (Recorded:11Qgz1208) to Recorded   2  B Complex TABS; Therapy: (Recorded:31Oct2016) to Recorded   3  HydroCHLOROthiazide 25 MG Oral Tablet; TAKE 1 TABLET DAILY; Therapy: (Mary Garcia) to Recorded   4  Inositol 500 MG Oral Tablet; Therapy: (Recorded:31Oct2016) to Recorded   5  Lansoprazole 30 MG TBDP; Therapy: (Recorded:31Oct2016) to Recorded   6  Lisinopril 10 MG Oral Tablet; TAKE 1 TABLET DAILY; Therapy: (Mary Garcia) to Recorded   7  MetFORMIN HCl - 1000 MG Oral Tablet; TAKE 1 TABLET TWICE DAILY WITH MEALS; Therapy: (Mary Garcia) to Recorded   8  Omeprazole 40 MG Oral Capsule Delayed Release; TAKE 1 CAPSULE DAILY; Therapy: (Mary Garcia) to Recorded   9  Singulair 10 MG Oral Tablet; Therapy: (Recorded:31Oct2016) to Recorded   10  Spiriva HandiHaler 18 MCG Inhalation Capsule; INHALE CONTENTS OF 1 CAPSULE    ONCE DAILY; Therapy: (Mary Garcia) to Recorded   11  Tamsulosin HCl - 0 4 MG Oral Capsule; take 1 capsule daily; Therapy: 92FMY9948 to (Evaluate:97Ybd0516)  Requested for: 95POE5196; Last    Rx:[id number] Ordered   12  Tricor TABS; TAKE 145 MG Daily; Therapy: (Mary Garcia) to Recorded   13  Vitamin C CAPS; Therapy: (Recorded:31Oct2016) to Recorded    Allergies    1  No Known Drug Allergies    2   Seasonal    Vitals  Signs   Recorded: 23Feb2017 11:28AM   Height: 5 ft 10 in  Weight: 320 lb 6 4 oz  BMI Calculated: 45 97  BSA Calculated: 2 55    Future Appointments    Date/Time Provider Specialty Site   03/23/2017 10:30 AM ZEINAB Subramanian  Bariatric Medicine [facility]     Signatures   Electronically signed by : CAIT Modi; Feb 23 2017  2:44PM EST                       (Author)    Electronically signed by :  ZEINAB Kirkpatrick ; Apr 14 2017 10:00AM EST

## 2018-01-12 NOTE — PROGRESS NOTES
Message  Reviewed post-operative pureed and soft foods diet with pt  Discussed gradual diet advancement and portion size progression  Discussed adequate hydration, signs and symptoms of dehydration  Discussed recommended post-operative vitamin supplementation and protein supplements  Discussed importance of regular physical activity  Provided pt with contact information for future questions/concerns  Active Problems    1  Acid reflux (530 81) (K21 9)   2  Arthritis (716 90) (M19 90)   3  Asthma (493 90) (J45 909)   4  Benign essential hypertension (401 1) (I10)   5  Blood tests prior to treatment or procedure (V72 63) (Z01 812)   6  Bowel disease (569 9) (K63 9)   7  Diabetes mellitus (250 00) (E11 9)   8  History of back problems   9  Hypertriglyceridemia (272 1) (E78 1)   10  Morbid obesity (278 01) (E66 01)   11  Postgastrectomy malabsorption (579 3) (K91 2,Z90 3)   12  Prostate cancer (185) (C61)   13  Sleep apnea (780 57) (G47 30)   14  Status post gastric bypass for obesity (V45 86) (Z98 84)    Current Meds   1  Albuterol-Ipratropium 103-18 MCG/ACT AERO; INHALE 2 PUFFS Every 6 hours PRN; Therapy: (Recorded:24Ocw2290) to Recorded   2  B Complex TABS; Therapy: (Recorded:31Oct2016) to Recorded   3  Calcium CHEW;   Therapy: (Armando Pradhan) to Recorded   4  Enoxaparin Sodium 40 MG/0 4ML Subcutaneous Solution (Lovenox); inject once daily for   2 weeks post surgery; Therapy: 24VFE6771 to (Last Rx:15Jun2017)  Requested for: 27Jun2017 Ordered   5  Lisinopril 10 MG Oral Tablet; TAKE 1 TABLET DAILY; Therapy: (Veronica Alcantara) to Recorded   6  Multiple Vitamin CHEW;   Therapy: (Recorded:29Jun2017) to Recorded   7  Omeprazole 40 MG Oral Capsule Delayed Release; TAKE 1 CAPSULE DAILY; Therapy: (Veronica Alcantara) to Recorded   8  Oxycodone-Acetaminophen 5-325 MG Oral Tablet (Percocet); TAKE 1 TABLET EVERY 4   TO 6 HOURS AS NEEDED FOR PAIN;   Therapy: 41MOE2795 to (Evaluate:10Jun2017);  Last Rx:71Okt9593 Ordered   9  Singulair 10 MG Oral Tablet (Montelukast Sodium); Therapy: (Recorded:31Oct2016) to Recorded   10  Spiriva HandiHaler 18 MCG Inhalation Capsule; INHALE CONTENTS OF 1 CAPSULE    ONCE DAILY; Therapy: (Lenice Moll) to Recorded   11  Tamsulosin HCl - 0 4 MG Oral Capsule (Flomax); take 1 capsule daily; Therapy: 20VDY8749 to (Evaluate:72Lqu4412)  Requested for: 24UFV7816; Last    Rx:39Npn0848 Ordered   12  Tricor TABS (Fenofibrate); TAKE 145 MG Daily; Therapy: (Lenice Moll) to Recorded   13  Vitamin C CAPS; Therapy: (Recorded:31Oct2016) to Recorded    Allergies    1  No Known Drug Allergies    2   Seasonal    Signatures   Electronically signed by : CAIT Sorto; Jun 29 2017 11:33AM EST                       (Author)

## 2018-01-13 VITALS
DIASTOLIC BLOOD PRESSURE: 70 MMHG | BODY MASS INDEX: 45.1 KG/M2 | OXYGEN SATURATION: 97 % | HEART RATE: 98 BPM | RESPIRATION RATE: 20 BRPM | HEIGHT: 70 IN | SYSTOLIC BLOOD PRESSURE: 124 MMHG | WEIGHT: 315 LBS | TEMPERATURE: 97.5 F

## 2018-01-13 VITALS — HEIGHT: 70 IN | BODY MASS INDEX: 45.1 KG/M2 | WEIGHT: 315 LBS

## 2018-01-13 VITALS — WEIGHT: 313.5 LBS | BODY MASS INDEX: 44.88 KG/M2 | HEIGHT: 70 IN

## 2018-01-13 VITALS
SYSTOLIC BLOOD PRESSURE: 122 MMHG | DIASTOLIC BLOOD PRESSURE: 78 MMHG | RESPIRATION RATE: 17 BRPM | HEART RATE: 92 BPM | WEIGHT: 313 LBS | BODY MASS INDEX: 44.81 KG/M2 | HEIGHT: 70 IN | TEMPERATURE: 98.6 F

## 2018-01-13 NOTE — PROGRESS NOTES
Discussion/Summary  Discussion Summary:   Reviewed post-operative pureed and soft foods diet with pt  Discussed gradual diet advancement and portion size progression  Discussed adequate hydration, signs and symptoms of dehydration  Discussed recommended post-operative vitamin supplementation and protein supplements  Discussed importance of regular physical activity  Provided pt with contact information for future questions/concerns  Active Problems    1  Acid reflux (530 81) (K21 9)   2  Arthritis (716 90) (M19 90)   3  Asthma (493 90) (J45 909)   4  Benign essential hypertension (401 1) (I10)   5  Blood tests prior to treatment or procedure (V72 63) (Z01 812)   6  Bowel disease (569 9) (K63 9)   7  Diabetes mellitus (250 00) (E11 9)   8  History of back problems   9  Hypertriglyceridemia (272 1) (E78 1)   10  Morbid obesity (278 01) (E66 01)   11  Postgastrectomy malabsorption (579 3) (K91 2,Z90 3)   12  Prostate cancer (185) (C61)   13  Sleep apnea (780 57) (G47 30)   14  Status post gastric bypass for obesity (V45 86) (Z98 84)    Surgical History    1  History of Gastrectomy Sleeve   2  History of Gastric Surgery For Morbid Obesity Bypass With Nick-en-Y   3  History of Laparoscopy (Diagnostic)   4  History of Prostate Place Interstitial Dev For Radiation Guide Multiple    Family History  Mother    1  Family history of hypertension (V17 49) (Z82 49)  Father    2  Family history of gout (V18 19) (Z82 69)   3  Family history of malignant neoplasm of prostate (V16 42) (Z80 42)    Social History    · Denied: History of Alcohol use   · Assistive Devices: Cane   ·    · Former smoker (M66 20) (Z87 527)   · No alcohol use   · No drug use    Current Meds   1  Albuterol-Ipratropium 103-18 MCG/ACT AERO; INHALE 2 PUFFS Every 6 hours PRN; Therapy: (Recorded:42Zix9643) to Recorded   2  B Complex TABS; Therapy: (Recorded:31Oct2016) to Recorded   3  Calcium CHEW;   Therapy: (Gates Collie) to Recorded   4  Enoxaparin Sodium 40 MG/0 4ML Subcutaneous Solution (Lovenox); inject once daily for   2 weeks post surgery; Therapy: 46DQH5973 to (Last Rx:15Jun2017)  Requested for: 27Jun2017 Ordered   5  Lisinopril 10 MG Oral Tablet; TAKE 1 TABLET DAILY; Therapy: (Wanetta Scale) to Recorded   6  Multiple Vitamin CHEW;   Therapy: (Recorded:29Jun2017) to Recorded   7  Omeprazole 40 MG Oral Capsule Delayed Release; TAKE 1 CAPSULE DAILY; Therapy: (Wanetta Scale) to Recorded   8  Oxycodone-Acetaminophen 5-325 MG Oral Tablet (Percocet); TAKE 1 TABLET EVERY 4   TO 6 HOURS AS NEEDED FOR PAIN;   Therapy: 87HWD3952 to (Evaluate:10Jun2017); Last Rx:05Jun2017 Ordered   9  Singulair 10 MG Oral Tablet (Montelukast Sodium); Therapy: (Recorded:31Oct2016) to Recorded   10  Spiriva HandiHaler 18 MCG Inhalation Capsule; INHALE CONTENTS OF 1 CAPSULE    ONCE DAILY; Therapy: (Wanetta Scale) to Recorded   11  Tamsulosin HCl - 0 4 MG Oral Capsule (Flomax); take 1 capsule daily; Therapy: 07ZPK4146 to (Evaluate:53Jjw4657)  Requested for: 52JEL6307; Last    Rx:69Wdh6192 Ordered   12  Tricor TABS (Fenofibrate); TAKE 145 MG Daily; Therapy: (Wanetta Scale) to Recorded   13  Vitamin C CAPS; Therapy: (Recorded:31Oct2016) to Recorded    Allergies    1  No Known Drug Allergies    2  Seasonal    Future Appointments    Date/Time Provider Specialty Site   07/27/2017 11:15 AM Dionicia Severin, M D   [facility]   10/05/2017 09:15 AM Maurice Gallegos Urology [facility] FOR UROLOGY [place]   10/03/2017 10:30 AM Mora Mchugh, [facility]     Signatures   Electronically signed by : CAIT Huddleston; Jun 29 2017 11:41AM EST                       (Author)    Electronically signed by : EZINAB Tirado ; Jun 29 2017  2:47PM EST                       (Validation)

## 2018-01-13 NOTE — CONSULTS
Assessment   1  Headache (784 0) (R51)   2  Memory difficulty (780 93) (R41 3)    Plan   Headache    · Nortriptyline HCl - 10 MG Oral Capsule; 1-3 po qhs   Rx By: Sabino De Anda; Dispense: 0 Days ; #:50 Capsule; Refill: 5;For: Headache; DALIA = N; Sent To: [facility]/PHARMACY #[id number] Headache, Memory difficulty    · (1) CRISS SCREEN W/REFLEX TO TITER/PATTERN; Status:Active; Requested    LYV:92PCM2731; Perform:[facility]; PVX:83NQD0682;DFJOPUG;TMQ:BXFLPZFL, Memory difficulty; Ordered By:Cristi Ramirez;   · (1) LYME ANTIBODY PROFILE W/REFLEX TO WESTERN BLOT; Status:Active; Requested    PNH:50XBW8278; Perform:[facility]; HIS:62WFC2973;AUCXLJY;PUK:QECEKRAG, Memory difficulty; Ordered By:Cristi Ramirez;   · (1) SED RATE; Status:Active; Requested DMA:27BAY8946; Perform:[facility]; LM28FIZ2203;IXRDVNN;CHF:LZXAFHYE, Memory difficulty; Ordered By:Cristi Ramirez;  Memory difficulty    · (1) TSH WITH FT4 REFLEX; Status:Active; Requested YKC:14HLA9329; Perform:[facility]; OPW:65ABG5534;GHNOTME; For:Memory difficulty; Ordered By:Emmie Ramirez;   · EEG AWAKE AND ASLEEP; Status:Hold For - Scheduling; Requested QAC:98QFF7054; Perform:[facility]; ZJQ:73CEL7421;QAHIYDC; For:Memory difficulty; Ordered By:Emmie Ramirez;   · MRI BRAIN NEUROQUANT WO CONTRAST; Status:Need Information - Financial    Authorization; Requested XNR:[id number]; Perform:[facility]; [id number];EFWFKOE; For:Memory difficulty; Ordered By:Emmie Ramirez;   · Follow-up visit in 2 months Evaluation and Treatment  Follow-up  Status: Hold For -    Scheduling  Requested for: 15BOS0576   Ordered; For: Memory difficulty; Ordered By: Sabino Brain Performed:  Due: 83AVR1720    Discussion/Summary   Discussion Summary:    Vern Or has symptoms of headaches suggestive of tension headache, however concern is the increased frequency over the last several months in association with cognitive issues   Have recommended MRI of the head using neuro quiet as well as EEG  Will also obtain blood work for both headache and cognitive difficulties (he has A39 and folic acid levels already pending)  Have recommended a trial of nortriptyline 10 mg at bedtime gradually increasing every week to 2 weeks by 10 mg up to 30 mg and hopes to control his headaches  If he still having issues with sleep will refer him back to Dr Caio Urrutia  All questions were answered to satisfaction  Chief Complaint   Chief Complaint Free Text Note Form: Patient is a 47year old right handed gentleman referred by Dr Devonte Martinez for headache, memory difficulty and sleep difficulty s/p gastric surgery 6/2017  History of Present Illness   HPI: Igor Bejarano presents today with the above complaints  He reports headache which he states seems like up bandlike pressure around his head  He states that sometimes it is a stabbing pain when it is more severe  He notes no photophobia with the headache he states occasionally he notes some sonophobia and only if it is severe dizzy noted a little bit of nausea  He states when he has a headache he tends to take some Tylenol and relax  He states that sometimes the headache comes back after a few hours  He states that he is able to sit down and relax usually the headache gets better  He finds the headache is often precipitated by stress, tension or irritation  He states sometimes his boys fight and this will often trigger headache  He is having difficulty reading small print and sometimes when he tries to read this frustrates him any gets a headache  He states that he has been getting headaches daily for about the last 8 or 9 months  He states prior to that he would get a similar headache about once or twice a week  He denies any family history of headaches  He denies any aura associated with the headache, just a gradual intensification  In addition to headaches he has been having difficulty with his memory   He states that his memory issues began a few years ago  He states he will talk to somebody and then they will ask him something is if he could remember what they talked about and it was not clear to him  He states that sometimes his children tell him that he is they are going somewhere and afterward he does not remember  He states that recently he has been having trouble remembering to pay his bills  He states he was evaluated in the past for sleep apnea by my associate Dr Leighton Alcantara  He reports some issues with sleep of late      Review of Systems   Neurological ROS:      Constitutional: no fever, no chills, no recent weight gain, no recent weight loss, no complaints of feeling tired, no changes in appetite  HEENT: blurred vision  Cardiovascular:  no chest pain or pressure, no palpitations present, the heart rate was not rapid or irregular, no swelling in the arms or legs, no poor circulation  Respiratory:  no unusual or persistant cough, no shortness of breath with or without exertion  Gastrointestinal: changes in bowel habits  Genitourinary: urinary hesitancy  Musculoskeletal: arthralgias,-- head/neck/back pain-- and-- pain while walking  Integumentary  no masses, no rash, no skin lesions, no livedo reticularis  Psychiatric:  no anxiety, no depression, no mood swings, no psychiatric hospitalizations, no sleep problems  Endocrine excessive thirst-- and-- loss of sexual ability or drive   Hematologic/Lymphatic:  no unusual bleeding, no tendency for easy bruising, no clotting skin or lumps  Neurological General: headache,-- nausea or vomiting,-- lightheadedness,-- trouble falling asleep-- and-- waking up at night  Neurological Mental Status: confusion,-- difficulty expressing/understanding speech-- and-- memory problems  Neurological Cranial Nerves: vertigo or dizziness-- and-- slurred speech  Neurological Motor findings include:  no tremor, no twitching, no cramping(pre/post exercise), no atrophy  Neurological Coordination:  no unsteadiness, no vertigo or dizziness, no clumsiness, no problems reaching for objects  Neurological Sensory: numbness-- and-- tingling  Neurological Gait:  no difficulty walking, not falling to one side, no sensation of being pushed, has not had falls  ROS Reviewed:    ROS reviewed  Active Problems   1  Acid reflux (530 81) (K21 9)   2  Arthritis (716 90) (M19 90)   3  Asthma (493 90) (J45 909)   4  Benign essential hypertension (401 1) (I10)   5  Blood tests prior to treatment or procedure (V72 63) (Z01 812)   6  Bowel disease (569 9) (K63 9)   7  Diabetes mellitus (250 00) (E11 9)   8  Headache (784 0) (R51)   9  Helicobacter pylori (H  pylori) infection (041 86) (A04 8)   10  History of back problems   11  Hypertriglyceridemia (272 1) (E78 1)   12  Lower urinary tract symptoms (LUTS) (788 99) (R39 9)   13  Morbid obesity (278 01) (E66 01)   14  Obesity (BMI 30-39 9) (278 00) (E66 9)   15  Other constipation (564 09) (K59 09)   16  Postgastrectomy malabsorption (579 3) (K91 2,Z90 3)   17  Prostate cancer (185) (C61)   18  Sleep apnea (780 57) (G47 30)   19  Status post gastric bypass for obesity (V45 86) (N96 18)    Past Medical History   1  History of radiation therapy (V15 3) (Z92 3)  Active Problems And Past Medical History Reviewed: The active problems and past medical history were reviewed and updated today  Surgical History   1  History of Gastrectomy Sleeve   2  History of Gastric Surgery For Morbid Obesity Bypass With Nick-en-Y   3  History of Laparoscopy (Diagnostic)   4  History of Prostate Place Interstitial Dev For Radiation Guide Multiple  Surgical History Reviewed: The surgical history was reviewed and updated today  Family History   Mother    1  Family history of hypertension (V17 49) (Z82 49)  Father    2  Family history of gout (V18 19) (Z82 69)   3   Family history of malignant neoplasm of prostate (V16 42) (Z80 45)  Family History Reviewed: The family history was reviewed and updated today  Social History    · Denied: History of Alcohol use   · Assistive Devices: Cane   · Disabled   ·    · Former smoker (B52 32) (U22 136)   · No alcohol use   · No drug use   · Retired  Social History Reviewed: The social history was reviewed and updated today  Current Meds    1  Albuterol-Ipratropium 103-18 MCG/ACT AERO; INHALE 2 PUFFS Every 6 hours PRN; Therapy: (Recorded:18Txj3014) to Recorded   2  B Complex TABS; TAKE 1 TABLET DAILY; Therapy: (Recorded:12Jan2018) to Recorded   3  Calcium CHEW; chew 1 tablet twice daily; Therapy: (Recorded:12Jan2018) to Recorded   4  Lisinopril 10 MG Oral Tablet; TAKE 1 TABLET DAILY; Therapy: (Viktoria Grubbs) to Recorded   5  Multiple Vitamin CHEW; CHEW 2 TABLET Daily; Therapy: (Recorded:12Jan2018) to Recorded   6  Singulair 10 MG Oral Tablet; TAKE 1 TABLET Bedtime; Therapy: (Recorded:12Jan2018) to Recorded   7  Spiriva HandiHaler 18 MCG Inhalation Capsule; INHALE CONTENTS OF 1 CAPSULE     ONCE DAILY; Therapy: (Viktoria Grubbs) to Recorded   8  Tamsulosin HCl - 0 4 MG Oral Capsule; take 1 capsule daily; Therapy: 96WGJ2688 to (Evaluate:01Esp6327)  Requested for: 08EYS4764; Last     Rx:12Rre7038 Ordered   9  Tricor TABS; TAKE 1 TABLET TWICE DAILY; Therapy: (Recorded:12Jan2018) to Recorded   10  Vitamin C CAPS; Take 1 capsule twice daily; Therapy: (Recorded:12Jan2018) to Recorded  Medication List Reviewed: The medication list was reviewed and updated today  Allergies   1  No Known Drug Allergies  2   Seasonal    Vitals   Signs   Recorded: 18UMK6993 36:06HX   Systolic: 658, LUE, Standing  Diastolic: 62, LUE, Standing  Recorded: 43GWL4598 12:40PM   Heart Rate: 66  Systolic: 411, LUE, Sitting  Diastolic: 60, LUE, Sitting  Height: 5 ft 10 in  Weight: 243 lb 4 oz  BMI Calculated: 34 9  BSA Calculated: 2 27  Pain Scale: 7    Physical Exam GENERAL:     Cooperative in no acute distress  Well-developed and well-nourished          HEAD and NECK      Head is atraumatic normocephalic with no lesions or masses  Neck is supple with full range of motion          CARDIOVASCULAR     Carotid Arteries-no carotid bruits  NEUROLOGIC:     Mental Status-the patient is awake alert  He scored 20/30 on MOCA     Cranial Nerves: Visual fields are full to confrontation  Discs are flat  Extraocular movements are full without nystagmus  Pupils are 2-1/2 mm and reactive  Face is symmetrical to light touch  Movements of facial expression move symmetrically  Hearing is normal to finger rub bilaterally  Soft palate lifts symmetrically  Shoulder shrug is symmetrical  Tongue is midline without atrophy  Motor: No drift is noted on arm extension  Strength is full in the upper and lower extremities with normal bulk and tone  Sensory: Intact to temperature and vibratory sensation in the upper and lower extremities bilaterally  Cortical function is intact  Coordination: Finger to nose testing is performed accurately  Romberg is negative  Gait reveals a normal base with symmetrical arm swing   Tandem walk is normal      Reflexes:  Trace to 1 in the biceps, triceps, brachioradialis, knee jerk and ankle jerk regions Toes are downgoing             Future Appointments      Date/Time Provider Specialty Site   05/29/2018 10:30 AM Leanna Gallegos Urology [facility]OLOGY [place]   07/12/2018 10:00 AM Galileo cMhugh, [facility] General Surgery [facility]     Signatures    Electronically signed by : Fate Kussmaul, MD; Jan 12 2018  1:30PM EST                       (Author)

## 2018-01-14 VITALS
HEIGHT: 70 IN | DIASTOLIC BLOOD PRESSURE: 86 MMHG | WEIGHT: 257.03 LBS | TEMPERATURE: 97.4 F | RESPIRATION RATE: 15 BRPM | SYSTOLIC BLOOD PRESSURE: 144 MMHG | HEART RATE: 82 BPM | BODY MASS INDEX: 36.8 KG/M2

## 2018-01-14 VITALS
BODY MASS INDEX: 45.1 KG/M2 | DIASTOLIC BLOOD PRESSURE: 80 MMHG | SYSTOLIC BLOOD PRESSURE: 136 MMHG | HEART RATE: 72 BPM | HEIGHT: 70 IN | WEIGHT: 315 LBS

## 2018-01-14 VITALS
BODY MASS INDEX: 45.1 KG/M2 | HEIGHT: 70 IN | SYSTOLIC BLOOD PRESSURE: 144 MMHG | DIASTOLIC BLOOD PRESSURE: 80 MMHG | HEART RATE: 72 BPM | WEIGHT: 315 LBS | TEMPERATURE: 98.6 F

## 2018-01-15 NOTE — RESULT NOTES
Dear Radha Vega,   Your test results have returned and are listed below:      Discussion/Summary  Your results show some abnormalities  Your potassium level is low, which is common while you are taking a diuretic ( water pill)  Enclosed is a list of high potassium foods to incorporate into your diet  Your level will be checked again after surgery  If you have any questions, please don't hesitate to call the office  Sincerely,      Signatures   Electronically signed by : CAIT Corona; Dec  6 2016 12:01PM EST                       (Author)    Electronically signed by :  ZEINAB Day ; Dec  7 2016  3:21PM EST

## 2018-01-15 NOTE — PROGRESS NOTES
Preliminary Nursing Report                Patient Information    Initial Encounter Entry Date:   2016 10:06 AM EST (Automated Transmission Automated Transmission)       Last Modified:   {José Luis Kraus}              Legal Name: Cyrus Herron Number:        YOB: 1965        Age (years): 53        Gender: M        Body Mass Index (BMI): 42 kg/m2        Height: 71 in  Weight: 301 lbs (137 kgs)           Address:   [address]              Phone: [phone]   (consent to leave messages)        Email:        Ethnicity: Decline to State        Orthodox:        Marital Status:        Preferred Language: English        Race: Other Race                    Patient Insurance Information        Primary Insurance Information Carrier Name: {Primary  CarrierName}           Carrier Address:   {Primary  CarrierAddress}              Carrier Phone: {Primary  CarrierPhone}          Group Number: {Primary  GroupNumber}          Policy Number: {Primary  PolicyNumber}          Insured Name: {Primary  InsuredName}          Insured : {Primary  InsuredDOB}          Relationship to Insured: {Primary  RelationshiptoInsured}           Secondary Insurance Information Carrier Name: {Secondary  CarrierName}           Carrier Address:   {Secondary  CarrierAddress}              Carrier Phone: {Secondary  CarrierPhone}          Group Number: {Secondary  GroupNumber}          Policy Number: {Secondary  PolicyNumber}          Insured Name: {Secondary  InsuredName}          Insured : {Secondary  InsuredDOB}          Relationship to Insured: {Secondary  RelationshiptoInsured}                       Health Profile   Booking #:   Luci Alexander #: [id number]               DOS: 2016    Surgery : LAPAROSCOPY, SURGICAL PROSTATECTOMY, RETROPUBIC RADICAL, INCLUDING NERVE SPARING    Add'l Procedures/Notes:     Surgery Risk: Intermediate          Precautions     Diabetes mellitus Allergies    No Known Drug Allergies       Seasonal       Clinical Comments: Reaction Type: , Reaction: , Severity:              Medications    Albuterol-Ipratropium 103-18 MCG/ACT AERO       Hydrochlorothiazide 25 MG Oral Tablet       Lisinopril 10 MG Oral Tablet       MetFORMIN HCl - 1000 MG Oral Tablet       Motrin 400 MG TABS       Spiriva HandiHaler 18 MCG Inhalation Capsule       Tricor TABS               Conditions    Arthritis       Benign essential hypertension       Bowel disease       Diabetes mellitus       History of back problems       Prostate cancer               Family History    None             Surgical History    None             Social History    Denies Alcohol use       Former smoker                               Patient Instructions       ? NPO Instructions   The day before surgery it is recommended to have a light dinner at your usual time and you are allowed a light snack early in the evening  Do not eat anything heavy or eat a big meal after 7pm  Do not eat or drink anything after midnight prior to your surgery  If you are supposed to take any of your medications, do so with a sip of water  Failure to follow these instructions can lead to an increased risk of lung complications and may result in a delay or cancellation of your procedure  If you have any questions, contact your institution for further instructions  No candy, no gum, no mints, no chewing tobacco   Triggered by: Medical Procedure Risk         ? ACE/ARB (Blood Pressure Medication) 1  Please continue the following medications up to the evening before surgery, but do not take it on the day of surgery  Please restart your medications as soon as clinically feasible  Triggered by: Lisinopril 10 MG Oral Tablet         ? Bronchodilator 18  Please continue the following medications up to and including the day of surgery  Please bring this medication with you on the day of surgery    Triggered by: Spiriva HandiHaler 18 MCG Inhalation Capsule         ? Diabetic Medication   Please decrease your morning insulin dose to one-half of normal dose  If you are taking oral diabetes medications, the morning dose should be omitted  If taking metformin (Glucophage), discontinue the medication for 24 hours prior to surgery  If you have an insulin pump, continue at a basal rate only  Triggered by: Diabetes mellitus, , MetFORMIN HCl - 1000 MG Oral Tablet         ? Diuretics (Water Pills) 28, 29  Please continue the following medications up to the evening before surgery, but do not take it on the day of surgery  Triggered by: Hydrochlorothiazide 25 MG Oral Tablet         ? NSAID (Pain Medication) 61  Please stop ibuprofen, naproxen and other non-steroidal anti-inflammatory drugs (NSAIDS) for 24 hrs before surgery  Triggered by: Motrin 400 MG TABS               Testing Considerations       ? Basic Metabolic Panel (BMP) t  If test was completed and normal within last six months, repeat test is not necessary  Triggered by: Benign essential hypertension, Diabetes mellitus, Prostate cancer         ? Blood Glucose on Day of Surgery t  Please check the blood sugar on the morning of surgery  Triggered by: Diabetes mellitus         ? Complete Blood Count (CBC) t, client, client  If test was completed and normal within last six months, repeat test is not necessary  Triggered by: Age or Facility Rec         ? Electrocardiogram (ECG) t  Patient does not need new test if normal ECG is present within the last six months and no change in clinical condition  Triggered by: Benign essential hypertension, Diabetes mellitus         ? Hemoglobin A1c (HbA1c) client  If test was completed and normal within the last three months, repeat test is not necessary  Triggered by: Diabetes mellitus, Age or Facility Rec         ? Type and Screen client  Type and Screen - Blood:  If there is anticipated or possible large blood loss with this procedure, then a Type and Screen for Blood should be ordered  Triggered by: Age or Facility Rec               Consultations       No recommendations for this classification  Miscellaneous Questions         Question: Are you able to walk up a flight of stairs, walk up a hill or do heavy housework WITHOUT having chest pain or shortness of breath? Answer: YES                   Allergies/Conditions/Medications Not Found        The following were not recognized by our system when generating the recommendations  Please consider if this would impact any preoperative protocols  ? Denies Alcohol use       ? History of back problems       ? Albuterol-Ipratropium 103-18 MCG/ACT AERO       ? Tricor TABS                  Appointment Information         Date:    07/27/2016        Location:    [place]        Address:           Directions:                      Footnotes revision 14      ?? Denotes a free-text entry  Legal Disclaimer: Any and all recommendations and services provided herein are designed to assist in the preoperative care of the patient  Nothing contained herein is designed to replace, eliminate or alleviate the responsibility of the attending physician to supervise and determine the patient?s preoperative care and course of treatment  Failure to provide complete, accurate information may negatively impact the system?s ability to recommend the proper preoperative protocol  THE ATTENDING PHYSICIAN IS RESPONSIBLE TO REVIEW THE SUGGESTED PREOPERATIVE PROTOCOLS/COURSE OF TREATMENT AND PRESCRIBE THE FINAL COURSE OF PREOPERATIVE TREATMENT IN CONSULTATION WITH THE PATIENT  THE ePREOP SYSTEM AND ITS MATERIALS ARE PROVIDED ? AS IS? WITHOUT WARRANTY OF ANY KIND, EXPRESS OR IMPLIED, INCLUDING, BUT NOT LIMITED TO, WARRANTIES OF PERFORMANCE OR MERCHANTABILITY OR FITNESS FOR A PARTICULAR PURPOSE   PATIENT AND PHYSICIANS HEREBY AGREE THAT THEIR USE OF THE MATERIALS AND RESOURCES ACT AS A CONSENT TO RELEASE AND WAIVE ePREOP FROM ANY AND ALL CLAIMS OF WARRANTY, TORT OR CONTRACT LAW OF ANY KIND  Electronically signed Viji DEXTER    Jun 8 2016  5:10PM EST

## 2018-01-16 NOTE — RESULT NOTES
Message  I asked Dr Chencho Mena to review patient's WBC count-she does not feel this is related to his RT-advised that his PCP should follow-up on this-I called patient and advised him of same  I will mail the results to him again so he can share with his PCP who is outside of [address] [place] [address]'s   CR      Signatures   Electronically signed by : Rachelle Calderon, [facility]; Oct 19 2017  3:37PM EST                       (Author)

## 2018-01-16 NOTE — RESULT NOTES
Discussion/Summary   October 2017 labs reviewed      Your white blood cell count is low at 2 54  This can sometimes be seen during times of active rapid weight loss but should correct after that  This level also may be low related to your cancer/treatments  White blood cells help the body fight off infection  If you are having frequent illness or infection, please review with PCP /oncologist now otherwise review with PCP at a routine follow-up as this may need further evaluation  I am asking your radiation oncologist to also review this lab  Your potassium level is mildly low at 3 4   Potassium is important for many functions in the body including muscle contractions  Please include some high potassium foods in your diet daily-some good sources include: banana, orange, tomato, sweet potato, tomato, potato and deep green vegetabls such as broccoli, brussel sprouts, kale spinach  Recommend that you follow-up with your PCP for this as the level may need to be rechecked/supplemented  Your vitamin D is low at 28 5   Both vitamin D and calcium are important for bone health  You need to add up the TOTAL  amount of vitamin D that is in your multivitamin/mineral (s) and what is in your calcium citrate with D together  The TOTAL  should add up to between 6921-4224 IU of vitamin D per day  You may need to add additional vitamin D3  to get to this level  Vitamin D 3  can be found over -the-counter  Your TOTAL  calcium amount for the day -which includes the calcium in  all your supplements ADDED TOGETHER should equal  1500 mg per day (up  to 1900 mg may be ok  )This includes the calcium that is in your Multivitamin/mineral(s) and what is in your calcium citrate with D   Calcium is best absorbed in divided doses of 500 mg each  Also it is best spaced 2 hours apart from any iron  Iron and calcium taken at the same time âfightâ to be absorbed, so do not take them at the same time        Your folate level is high at > 20  This is generally safe and o k -no need to adjust supplements for this  Please keep your routine office visit  If you do not have a scheduled routine appointment, please call the office to schedule it  Our office number is [phone]  If you have questions about your results, this will be discussed with you at your upcoming  visit  If you have gotten your most recent labs after your recent visit and have questions, please call the office  I look forward to seeing you at your next visit  Verified Results  (1) CBC/ PLT (NO DIFF) 04Oct2017 08:54AM Jada Bonilla     Test Name Result Flag Reference   HEMATOCRIT 40 7 %  36 5-49 3   HEMOGLOBIN 13 1 g/dL  12 0-17 0   MCHC 32 2 g/dL  31 4-37 4   MCH 28 1 pg  26 8-34 3   MCV 87 fL  82-98   PLATELET COUNT 101 Thousands/uL  149-390   RBC COUNT 4 66 Million/uL  3 88-5 62   RDW 13 2 %  11 6-15 1   WBC COUNT 2 54 Thousand/uL L 4 31-10 16   MPV 11 2 fL  8 9-12 7     (1) COMPREHENSIVE METABOLIC PANEL [id number] 11:70GL SabrinatinWendi ballard     Test Name Result Flag Reference   SODIUM 145 mmol/L  136-145   POTASSIUM 3 4 mmol/L L 3 5-5 3   CHLORIDE 108 mmol/L  100-108   CARBON DIOXIDE 29 mmol/L  21-32   ANION GAP (CALC) 8 mmol/L  4-13   BLOOD UREA NITROGEN 15 mg/dL  5-25   CREATININE 1 04 mg/dL  0 60-1 30   Standardized to IDMS reference method   CALCIUM 9 1 mg/dL  8 3-10 1   BILI, TOTAL 0 40 mg/dL  0 20-1 00   ALK PHOSPHATAS 46 U/L     ALT (SGPT) 39 U/L  12-78   Specimen collection should occur prior to Sulfasalazine administration due to the potential for falsely depressed results  AST(SGOT) 21 U/L  5-45   Specimen collection should occur prior to Sulfasalazine administration due to the potential for falsely depressed results     ALBUMIN 4 1 g/dL  3 5-5 0   TOTAL PROTEIN 7 8 g/dL  6 4-8 2   eGFR 96 ml/min/1 73sq m     National Kidney Disease Education Program recommendations are as follows:  GFR calculation is accurate only with a steady state creatinine  Chronic Kidney disease less than 60 ml/min/1 73 sq  meters  Kidney failure less than 15 ml/min/1 73 sq  meters  GLUCOSE FASTING 92 mg/dL  65-99   Specimen collection should occur prior to Sulfasalazine administration due to the potential for falsely depressed results  Specimen collection should occur prior to Sulfapyridine administration due to the potential for falsely elevated results  (1) FERRITIN [id number] 08:54AM Brandon Naik     Test Name Result Flag Reference   FERRITIN 86 ng/mL  8-388     (1) FOLATE [id number] 08:54AM Brandon Naik     Test Name Result Flag Reference   FOLATE >20 0 ng/mL H 3 1-17 5     (1) LIPID PANEL, FASTING [id number] 08:54AM Brandon Naik     Test Name Result Flag Reference   CHOLESTEROL 131 mg/dL     HDL,DIRECT 47 mg/dL  40-60   Specimen collection should occur prior to Metamizole administration due to the potential for falsley depressed results  LDL CHOLESTEROL CALCULATED 73 mg/dL  0-100   Triglyceride:        Normal <150 mg/dl   Borderline High 150-199 mg/dl   High 200-499 mg/dl   Very High >499 mg/dl      Cholesterol:       Desirable <200 mg/dl    Borderline High 200-239 mg/dl    High >239 mg/dl      HDL Cholesterol:       High>59 mg/dL    Low <41 mg/dL      This screening LDL is a calculated result  It does not have the accuracy of the Direct Measured LDL in the monitoring of patients with hyperlipidemia and/or statin therapy  Direct Measure LDL (LFR273) must be ordered separately in these patients  TRIGLYCERIDES 54 mg/dL  <=150   Specimen collection should occur prior to N-Acetylcysteine or Metamizole administration due to the potential for falsely depressed results       (1) PTH N-TERMINAL (INTACT) [id number] 08:54AM Brandon Naik     Test Name Result Flag Reference   PARATHYROID HORMONE INTACT 23 0 pg/mL  14 0-72 0     (1) VITAMIN A [id number] 08:54AM Andres Finnish Order Number: [id number]     Test Name Result Flag Reference   VITAMIN A 68 ug/dL  24 - 85   Performed at:  [address]  [id number]  : Elisa Willams MD, Phone:  [phone]     (1) VITAMIN B1, WHOLE BLOOD [id number] 08:54AM Corey Mckinnon Order Number: [id number]     Test Name Result Flag Reference   VITAMIN B1, WHOLE BLOOD 146 1 nmol/L  66 5 - 200 0   Performed at:  [address]  [id number]  : Elisa Willams MD, Phone:  [phone]     (1) VITAMIN B12 04Oct2017 08:54AM Curly Serrato     Test Name Result Flag Reference   VITAMIN B12 717 pg/mL  100-900     (1) VITAMIN D 25-HYDROXY 04Oct2017 08:54AM Wojciech Mchugh     Test Name Result Flag Reference   VIT D 25-HYDROX 28 5 ng/mL L 30 0-100 0   This assay is a certified procedure of the CDC Vitamin D Standardization Certification Program (VDSCP)     Deficiency <20ng/ml   Insufficiency 20-30ng/ml   Sufficient  ng/ml     *Patients undergoing fluorescein dye angiography may retain small amounts of fluorescein in the body for 48-72 hours post procedure  Samples containing fluorescein can produce falsely elevated Vitamin D values  If the patient had this procedure, a specimen should be resubmitted post fluorescein clearance

## 2018-01-16 NOTE — PROGRESS NOTES
Message  Reviewed post-operative pureed and soft foods diet with pt  Discussed gradual diet advancement and portion size progression  Discussed adequate hydration, signs and symptoms of dehydration  Discussed recommended post-operative vitamin supplementation and protein supplements  Discussed importance of regular physical activity  Provided pt with contact information for future questions/concerns      Active Problems    1  Acid reflux (530 81) (K21 9)   2  Arthritis (716 90) (M19 90)   3  Asthma (493 90) (J45 909)   4  Benign essential hypertension (401 1) (I10)   5  Blood tests prior to treatment or procedure (V72 63) (Z01 812)   6  Bowel disease (569 9) (K63 9)   7  Diabetes mellitus (250 00) (E11 9)   8  History of back problems   9  Hypertriglyceridemia (272 1) (E78 1)   10  Morbid obesity (278 01) (E66 01)   11  Postgastrectomy malabsorption (579 3) (K91 2,Z90 3)   12  Prostate cancer (185) (C61)   13  Sleep apnea (780 57) (G47 30)   14  Status post gastric bypass for obesity (V45 86) (Z98 84)    Current Meds   1  Albuterol-Ipratropium 103-18 MCG/ACT AERO; INHALE 2 PUFFS Every 6 hours PRN; Therapy: (Recorded:93Ijo7871) to Recorded   2  B Complex TABS; Therapy: (Recorded:31Oct2016) to Recorded   3  Calcium CHEW;   Therapy: (Kaley Catalino) to Recorded   4  Enoxaparin Sodium 40 MG/0 4ML Subcutaneous Solution (Lovenox); inject once daily for   2 weeks post surgery; Therapy: 88CEL0189 to (Last Rx:15Jun2017)  Requested for: 27Jun2017 Ordered   5  Lisinopril 10 MG Oral Tablet; TAKE 1 TABLET DAILY; Therapy: (Priya Crease) to Recorded   6  Multiple Vitamin CHEW;   Therapy: (Recorded:29Jun2017) to Recorded   7  Omeprazole 40 MG Oral Capsule Delayed Release; TAKE 1 CAPSULE DAILY; Therapy: (Priya Crease) to Recorded   8  Oxycodone-Acetaminophen 5-325 MG Oral Tablet (Percocet); TAKE 1 TABLET EVERY 4   TO 6 HOURS AS NEEDED FOR PAIN;   Therapy: 11GBY7029 to (Evaluate:10Jun2017);  Last Rx:[id number] Ordered   9  Singulair 10 MG Oral Tablet (Montelukast Sodium); Therapy: (Recorded:31Oct2016) to Recorded   10  Spiriva HandiHaler 18 MCG Inhalation Capsule; INHALE CONTENTS OF 1 CAPSULE    ONCE DAILY; Therapy: (Olympia Redo) to Recorded   11  Tamsulosin HCl - 0 4 MG Oral Capsule (Flomax); take 1 capsule daily; Therapy: 44CFA0249 to (Evaluate:74Kly4799)  Requested for: 09DJR9756; Last    Rx:[id number] Ordered   12  Tricor TABS (Fenofibrate); TAKE 145 MG Daily; Therapy: (Olympia Redo) to Recorded   13  Vitamin C CAPS; Therapy: (Recorded:31Oct2016) to Recorded    Allergies    1  No Known Drug Allergies    2   Seasonal    Signatures   Electronically signed by : CAIT Reynoso; Jun 29 2017 11:38AM EST                       (Author)

## 2018-01-16 NOTE — PROGRESS NOTES
History of Present Illness  Bariatric MNT [facility] Screening Preop [facility]:     He was on time  the appointment lasted: 60 minutes  The patient was present at the session  The diagnosis/reason for the appointment is: He has Grade III Obesity with a BMI of 45 4  He has the following comorbidities: diabetes type 2, mixed hyperlipidemia, hypertension and prostate cancer, BARRETT, ashtma, chronic constipation   Labs: Ronald Diaz He was reminded to have his labs drawn   He does not need to monitor his glucose  Exercise Frequency:  He does not exercise  Relationship to food: He grazes and eats large portions  He knows the difference between being comfortably full and uncomfortably full and knows the difference between being stuffed and comfortably full  He felt/thought they felt his best at 200 pounds he last weighed this several years ago  He completed a food journal He drinks 6 cups of water daily He drinks 0-2 caffienated beverages daily His motivators are:urologist recommended weight loss surgery to decrease risk associated with prostate cancer surgery  His obesity/being overweight is related to excess energy intake and sedentary lifestyle  and is evidenced by: BMI 45  Knowledge Deficit Prior to Education  He is new to the diet  Barriers to education:  He has no barriers to education  Medical Nutrition Therapy Intervention: Individualized Meal Plan, Daily Food /Exercise Diary, Lifestyle /Behavior Modification Techniques, Basic Pathophysiology of Obesity, Label Reading, Checklist of the Overview of Lesson Plans and Surgical changes to Stomach/GI  Area's Reviewed: Post - surgery goal weight, Lifestyle Reji Camacho Modification Techniques, [employer] in [place] and Pre- surgery goals /rules  Brief Review of Vitamins, diet progression for post-op and Pathophysiology of Obesity  His comprehension of the presented material was good  His receptivity to the presented material was good  His motivation was good  Provided: Nutrition Guidelines for Gastric Surgery, Bariatric Program Pre- Surgery Nutrition and Goals  Goals: His set goal for physical activity is walking , for 20-30 minutes, 3 days a week  Review Lesson Plans in [place] provided with 1800 calorie meal plan  Post Surgery: He will adhere to the diet progression: remain hydrated, consume adequate protein; and take vitamins as outlined in guidelines  Patient is a 40year old who is here for nutritional evaluation for weight loss surgery  I reviewed co-morbidities and medications prior to session  He first recalls having problems with weight gain at the age of 29   He has dieted in the past with variable success but would regain the weight back  (refer to diet history for details)  For his personal pre-op goals he will include protein at each meal and food journal on [employer]  He will complete all 6 lesson plans in his bariatric booklet  He was instructed on the importance of consistent vitamin and mineral intake after his surgery to prevent deficiencies  He currently takes a inositol ( b vitamin) B complex and vitamin C   Recommended that in addition, he take an adult multivitamin/mineral supplement plus 2000 IU of vitamin D3 Reviewed importance of support after surgery and discussed the web forum, pep tae and support group  Patient states adequate knowledge of nutrition, exercise and behavior modification required for long term success  Pt  is recommended for surgery and is aware that he will be required to follow the 2 weeks pre operative liver shrinking diet prior to surgery  Pt is also understands that he needs to implement lifestyle changes in preparation for surgery  Patient is aware that he has 6 months of weigh ins required by his insurance  Recommendations: He was provided contact information for any questions  He is recommended for surgery  He states adequate knowledge of nutrition, exercise, and behavior modification   He will attend a Team Meeting approximately 2-3 weeks prior to surgery  Active Problems    1  Arthritis (716 90) (M19 90)   2  Asthma (493 90) (J45 909)   3  Benign essential hypertension (401 1) (I10)   4  Bowel disease (569 9) (K63 9)   5  Diabetes mellitus (250 00) (E11 9)   6  History of back problems   7  Hypertriglyceridemia (272 1) (E78 1)   8  Prostate cancer (185) (C61)   9  Sleep apnea (780 57) (G47 30)    Surgical History    1  History of Laparoscopy (Diagnostic)   2  History of Prostate Place Interstitial Dev For Radiation Guide Multiple    Family History  Mother    1  Family history of hypertension (V17 49) (Z82 49)  Father    2  Family history of gout (V18 19) (Z82 69)   3  Family history of malignant neoplasm of prostate (V16 42) (Z80 42)    Social History    · Denied: History of Alcohol use   · Assistive Devices: Cane   ·    · Former smoker (P50 27) (I41 407)   · No alcohol use    Current Meds   1  Albuterol-Ipratropium 103-18 MCG/ACT AERO; INHALE 2 PUFFS Every 6 hours PRN; Therapy: (Recorded:09Qwv3590) to Recorded   2  Ciprofloxacin HCl - 500 MG Oral Tablet; Take I tablet twice daily starting the day prior to   procedure; Therapy: 77Ybx6392 to (Rima Chung)  Requested for: 53Bkv5920; Last   Rx:[id number] Ordered   3  Colace 100 MG Oral Capsule; Therapy: (Zuleyka Muro) to Recorded   4  HydroCHLOROthiazide 25 MG Oral Tablet; TAKE 1 TABLET DAILY; Therapy: (Mary Garcia) to Recorded   5  Lisinopril 10 MG Oral Tablet; TAKE 1 TABLET DAILY; Therapy: (Mary Garcia) to Recorded   6  MetFORMIN HCl - 1000 MG Oral Tablet; TAKE 1 TABLET TWICE DAILY WITH MEALS; Therapy: (Mary Garcia) to Recorded   7  Motrin 400 MG TABS (Ibuprofen); TAKE 1 TABLET Every 6 hours PRN; Therapy: (Mary Garcia) to Recorded   8  Omeprazole 40 MG Oral Capsule Delayed Release; TAKE 1 CAPSULE DAILY; Therapy: (Mary Garcia) to Recorded   9   Spiriva HandiHaler 18 MCG Inhalation Capsule; INHALE CONTENTS OF 1 CAPSULE   ONCE DAILY; Therapy: ([place]) to Recorded   10  Tricor TABS (Fenofibrate); TAKE 145 MG Daily; Therapy: (Recorded:71Irg7648) to Recorded    Allergies    1  No Known Drug Allergies    2  Seasonal    Signatures   Electronically signed by : CAIT Lomax; Oct 31 2016  3:33PM EST                       (Author)    Electronically signed by :  ZEINAB Whittaker ; Nov 12 2016 11:17AM EST

## 2018-01-17 NOTE — MISCELLANEOUS
Message  6/23/2017 @ 1110- post op follow up phone call completed  Pt is sipping liquids, using IS as instructed, reinforced importance of using IS to help prevent pneumonia  Ambulating about home without difficulty  Pain controlled with analgesia but crushing meds is causing nausea  Instructed pt to cut percocet in 4 pieces and take with sip of water instead of crushing  Reaffirmed examples of liquid diet over the next week  Pt stated understanding about discharge instructions and medication adjustments  Tolerating self administration of lovenox injections without difficulty  Pt educated on [id number] [facility]  Follow up appt with surgeon scheduled for next week  Instructed to call with any additional questions or concerns  Active Problems    1  Acid reflux (530 81) (K21 9)   2  Arthritis (716 90) (M19 90)   3  Asthma (493 90) (J45 909)   4  Benign essential hypertension (401 1) (I10)   5  Blood tests prior to treatment or procedure (V72 63) (Z01 812)   6  Bowel disease (569 9) (K63 9)   7  Diabetes mellitus (250 00) (E11 9)   8  History of back problems   9  Hypertriglyceridemia (272 1) (E78 1)   10  Morbid obesity (278 01) (E66 01)   11  Prostate cancer (185) (C61)   12  Sleep apnea (780 57) (G47 30)    Current Meds   1  Albuterol-Ipratropium 103-18 MCG/ACT AERO; INHALE 2 PUFFS Every 6 hours PRN; Therapy: (Recorded:52Mmf1662) to Recorded   2  B Complex TABS; Therapy: (Recorded:31Oct2016) to Recorded   3  Enoxaparin Sodium 40 MG/0 4ML Subcutaneous Solution (Lovenox); inject once daily for   2 weeks post surgery; Therapy: 77QIK3716 to (Last Rx:15Jun2017)  Requested for: 52FGW9783; Status:   ACTIVE - Retrospective By Protocol Authorization Ordered   4  HydroCHLOROthiazide 25 MG Oral Tablet; TAKE 1 TABLET DAILY; Therapy: (Melania Blue) to Recorded   5  Inositol 500 MG Oral Tablet; Therapy: (Recorded:31Oct2016) to Recorded   6  Lansoprazole 30 MG TBDP;    Therapy: (Recorded:31Oct2016) to Recorded   7  Lisinopril 10 MG Oral Tablet; TAKE 1 TABLET DAILY; Therapy: (Clarissa Landing) to Recorded   8  MetFORMIN HCl - 1000 MG Oral Tablet; TAKE 1 TABLET TWICE DAILY WITH MEALS; Therapy: (Clarissa Landing) to Recorded   9  Omeprazole 40 MG Oral Capsule Delayed Release; TAKE 1 CAPSULE DAILY; Therapy: (Clarissa Landing) to Recorded   10  Oxycodone-Acetaminophen 5-325 MG Oral Tablet (Percocet); TAKE 1 TABLET EVERY 4    TO 6 HOURS AS NEEDED FOR PAIN;    Therapy: 27KAJ1147 to (Evaluate:10Jun2017); Last Rx:05Jun2017 Ordered   11  Singulair 10 MG Oral Tablet (Montelukast Sodium); Therapy: (Recorded:31Oct2016) to Recorded   12  Spiriva HandiHaler 18 MCG Inhalation Capsule; INHALE CONTENTS OF 1 CAPSULE    ONCE DAILY; Therapy: (Clarissa Landing) to Recorded   13  Tamsulosin HCl - 0 4 MG Oral Capsule (Flomax); take 1 capsule daily; Therapy: 39HCX6354 to (Evaluate:77Yoi9021)  Requested for: 88AGA3597; Last    Rx:[id number] Ordered   14  Tricor TABS (Fenofibrate); TAKE 145 MG Daily; Therapy: (Antonette Landing) to Recorded   15  Vitamin C CAPS; Therapy: (Recorded:31Oct2016) to Recorded    Allergies    1  No Known Drug Allergies    2   Seasonal    Signatures   Electronically signed by : Marlyse Romberg, ; Jun 23 2017 11:43AM EST                       (Author)

## 2018-01-17 NOTE — PROGRESS NOTES
History of Present Illness  Bariatric Behavioral Health Evaluation [facility]:   He is here today because: Increase health, increase mobilty and prevent family health issues  He is seeking a Bariatric surgery evaluation  He researched this option for: Since   He realizes the post-op requirements Yes, patient has researched procedure; patient has family with bariatric surgery  His Psychiatric/Psychological diagnosis: He does not have an outpatient counselor  He does not have the counselor's number  He does not have a Psychiatrist  He does not have the Psychiatrist's number  He has not had Inpatient Treatment  (None reported )  Family Constellation: Family Constellation: Mother:  @ 66years old; failed hip replacement  Father:  @ 80years old; prostate cancer  Siblings: 1 brother  Children: 5 children  He lives withhis children   Domestic Violence: has not happened  Abuse History: (None reported )   Physical/psychological assessment Appearance: appropriate   Sociability: average  Affect: appropriate  Mood: calm  Thought Process: coherent  Speech: normal  Content: no impairment  The patient was oriented to person, oriented to place, oriented to time and normal memory   normal attention span  no decreased concentration ability  normal judgment  His emotional insight was: good  His intellectual insight was: good  Risk assessment: Symptoms:  no suicidal ideation, no suicide plan, no suicide attempt, no homicidal thoughts, no hopelessness, no helplessness, no feelings of despair, no anxiety, no depressed mood, no loss of interests, no anhedonia and no sense of isolation  The patient is currently asymptomatic  Associated symptoms:  no aggressive behavior, no high risk behavior, no racing thoughts, no periods of excess energy, no periods of euphoria, no delusions, no command hallucinations, no auditory hallucinations and no visual hallucinations  No associated symptoms are reported   The patient is not currently being treated for this problem  Pertinent medical history:  no depression, no seasonal affective disorder, no premenstrual dysphoric disorder, no postpartum depression, no anxiety disorder, no bipolar disorder, no post traumatic stress disorder, no eating disorder, no personality disorder, no schizophrenia, no chronic pain, no chronic headaches, no dementia, no malignancy and no HIV infection  Risk factors:  no bereavement, no financial stress, no relationship problems, no job loss, no social isolation, no access to lethal means, no alcohol abuse, no substance abuse, no physical abuse, no sexual abuse, no emotional abuse, no bullying, no previous suicide attempt, no recent psychiatric hospitalization, no recent family suicide and no recent friend suicide  No risk factors have been identified  Family history:  no suicide, no depression, no bipolar disorder and no substance abuse  He was previously evaluated by me  Sexual history: He is not pregnant  He does not have a history of   He does not have a history of miscarriage  He does not have a history of hypersexuality  He does not have a history of STD's  Recommendations: He is recommended for surgery  He states adequate knowledge of nutrition, exercise, and behavior modification  The Following Ratings are based on my: Obsevation of this individual over the last  Risk of Harm to Self or Others: The following are demographic risk factors associated with harm to others: male and unemployed  ( )  (None reported)  Recent Specific Risk Factors: The patient is currently asymptomatic  No associated symptoms are reported  Summary and Recommendations:   Low: No thoughts or occasional thoughts of suicide, but no intent or actions  Self inflicted scratches, abrasions, or other self- injurious of behavior where medical attention is typically not warranted   No elements of homicidality or occasional thoughts but no plan, intent, or actions  Active Problems    1  Arthritis (716 90) (M19 90)   2  Asthma (493 90) (J45 909)   3  Benign essential hypertension (401 1) (I10)   4  Bowel disease (569 9) (K63 9)   5  Diabetes mellitus (250 00) (E11 9)   6  History of back problems   7  Hypertriglyceridemia (272 1) (E78 1)   8  Prostate cancer (185) (C61)   9  Sleep apnea (780 57) (G47 30)    Surgical History    1  History of Laparoscopy (Diagnostic)   2  History of Prostate Place Interstitial Dev For Radiation Guide Multiple    Family History  Mother    1  Family history of hypertension (V17 49) (Z82 49)  Father    2  Family history of gout (V18 19) (Z82 69)   3  Family history of malignant neoplasm of prostate (V16 42) (Z80 42)    Social History    · Denied: History of Alcohol use   · Assistive Devices: Cane   ·    · Former smoker (G83 67) (L72 364)   · No alcohol use    Current Meds   1  Albuterol-Ipratropium 103-18 MCG/ACT AERO; INHALE 2 PUFFS Every 6 hours PRN; Therapy: (Recorded:86Khb6345) to Recorded   2  Ciprofloxacin HCl - 500 MG Oral Tablet; Take I tablet twice daily starting the day prior to   procedure; Therapy: 51Dnw4246 to (Tellis Pellet)  Requested for: 97Vof1171; Last   Rx:95Lzs3406 Ordered   3  Colace 100 MG Oral Capsule; Therapy: (Veronica Alcantara) to Recorded   4  HydroCHLOROthiazide 25 MG Oral Tablet; TAKE 1 TABLET DAILY; Therapy: (Veronica Alcantara) to Recorded   5  Lisinopril 10 MG Oral Tablet; TAKE 1 TABLET DAILY; Therapy: (Veronica Alcantara) to Recorded   6  MetFORMIN HCl - 1000 MG Oral Tablet; TAKE 1 TABLET TWICE DAILY WITH MEALS; Therapy: (Veronica Alcantara) to Recorded   7  Motrin 400 MG TABS (Ibuprofen); TAKE 1 TABLET Every 6 hours PRN; Therapy: (Veronica Alcantara) to Recorded   8  Omeprazole 40 MG Oral Capsule Delayed Release; TAKE 1 CAPSULE DAILY; Therapy: (Veronica Alcantara) to Recorded   9   Spiriva HandiHaler 18 MCG Inhalation Capsule; INHALE CONTENTS OF 1 CAPSULE   ONCE DAILY; Therapy: (Star Gamez) to Recorded   10  Tricor TABS (Fenofibrate); TAKE 145 MG Daily; Therapy: (Recorded:68Cfu0505) to Recorded    Allergies    1  No Known Drug Allergies    2  Seasonal    Vitals  Signs   Recorded: 48DZE9152 01:48PM   Height: 5 ft 11 in  Weight: 316 lb 7 oz  BMI Calculated: 44 13  BSA Calculated: 2 56     Note   Note:   Completed Behavioral Health Assessment  Provided patient, education as needed  Patient denies to [place] I diagnosis  Patient has a positive family history of cancer, diabetes and heart disease  Patient will work on the following goals; identify emotional and physical eating and pause before eating  Patient is knowledgeable of pre and post op requirements and meets criteria for [facility] bariatric surgery program  Patient is referred to physician  Signatures   Electronically signed by : JOE Garcia; Oct 31 2016  2:31PM EST                       (Author)    Electronically signed by :  ZEINAB Corral ; Nov 12 2016 11:17AM EST

## 2018-01-17 NOTE — MISCELLANEOUS
Message  Call from [facility] 6978 requesting refill on tamsulosin 0 4 mg, 1 capsule daily  Dr Robb haley'zan refill for medication plus 2 refills  [facility] pharmacy called  BE      Active Problems     1  Arthritis (716 90) (M19 90)   2  Asthma (493 90) (J45 909)   3  Benign essential hypertension (401 1) (I10)   4  Blood tests prior to treatment or procedure (V72 63) (Z01 812)   5  Bowel disease (569 9) (K63 9)   6  History of back problems   7  Hypertriglyceridemia (272 1) (E78 1)    Diabetes mellitus (250 00) (E11 9)       Prostate cancer (185) (C61)       Sleep apnea (780 57) (G47 30)     -      Annotations              wears c-pap  Morbid obesity (278 01) (E66 01)       Acid reflux (530 81) (K21 9)          Current Meds   1  Albuterol-Ipratropium 103-18 MCG/ACT AERO; INHALE 2 PUFFS Every 6 hours PRN; Therapy: (Recorded:28Ebm2753) to Recorded   2  B Complex TABS; Therapy: (Recorded:31Oct2016) to Recorded   3  HydroCHLOROthiazide 25 MG Oral Tablet; TAKE 1 TABLET DAILY; Therapy: (Read Pancho) to Recorded   4  Inositol 500 MG Oral Tablet; Therapy: (Recorded:31Oct2016) to Recorded   5  Lansoprazole 30 MG TBDP; Therapy: (Recorded:31Oct2016) to Recorded   6  Lisinopril 10 MG Oral Tablet; TAKE 1 TABLET DAILY; Therapy: (Read Pancho) to Recorded   7  MetFORMIN HCl - 1000 MG Oral Tablet; TAKE 1 TABLET TWICE DAILY WITH MEALS; Therapy: (Read Pancho) to Recorded   8  Omeprazole 40 MG Oral Capsule Delayed Release; TAKE 1 CAPSULE DAILY; Therapy: (Read Pancho) to Recorded   9  Singulair 10 MG Oral Tablet; Therapy: (Recorded:31Oct2016) to Recorded   10  Spiriva HandiHaler 18 MCG Inhalation Capsule; INHALE CONTENTS OF 1 CAPSULE    ONCE DAILY; Therapy: (Read Pancho) to Recorded   11  Tamsulosin HCl - 0 4 MG Oral Capsule; take 1 capsule daily; Therapy: 58VPZ8840 to (Evaluate:09Eud3439)  Requested for: 42ZNX8298; Last    Rx:00Yke1589 Ordered   12   Tricor TABS; TAKE 145 MG Daily; Therapy: (Salinas Mingle) to Recorded   13  Vitamin C CAPS; Therapy: (Recorded:31Oct2016) to Recorded    Allergies    1  No Known Drug Allergies    2   Seasonal    Signatures   Electronically signed by : ZEINAB Mcallister ; Jul 18 2017 10:27AM EST                       (Author)

## 2018-01-21 NOTE — PROGRESS NOTES
Assessment   1  Headache (784 0) (R51)   2  Postgastrectomy malabsorption (579 3) (K91 2,Z90 3)   3  Status post gastric bypass for obesity (V45 86) (Z98 84)   4  Obesity (BMI 30-39 9) (278 00) (E66 9)   5  Acid reflux (530 81) (K21 9)   6  Benign essential hypertension (401 1) (I10)   7  Sleep apnea (780 57) (G47 30)   8  Helicobacter pylori (H  pylori) infection (041 86) (A04 8)    Plan   Headache    · *1 -  NEUROLOGY Co-Management  * patent is s/p gastric sleeve surgery 2017-saw PCP [facility]) for c/o headache, sleep disturbances    (has Cpap-inconsistent use), memory issues, slurred speech at times-no active    symptoms when seen in the bariatric office-wants to see  neurologist-please evaluate    and treat as needed/send results to his PCP/[facility]  Thank you  Status:    Hold For - Scheduling,Retrospective Authorization  Requested for: [id number]   Ordered; For: Headache; Ordered By: Eliel Houser Performed:  Due: 33VFM0106; Last Updated By: Eliel Houser; 2018 11:34:42 AM  Care Summary provided  : Yes  Headache, Postgastrectomy malabsorption, Status post gastric bypass for obesity    · (1) CBC/ PLT (NO DIFF); Status:Active; Requested DVI:[id number]; Perform:[facility]; RRK:30VTD3692;LGHXYSU;PBQ:VXGZKXYM, Postgastrectomy malabsorption, Status post gastric bypass for obesity; Ordered By:Tali Mchugh;   · (1) FERRITIN; Status:Active; Requested DYV:[id number]; Perform:[facility]; PBC:52LRV8744;HAPVHQW;HQL:MXJBZORD, Postgastrectomy malabsorption, Status post gastric bypass for obesity; Ordered By:Tali Mchugh;   · (1) FOLATE; Status:Active; Requested XHM:[id number]; Perform:[facility]; NOQ:96KPS7560;KMWOPZG;YQL:WLOMUDHZ, Postgastrectomy malabsorption, Status post gastric bypass for obesity; Ordered By:Tali Mchugh;   · (1) METHYLMALONIC ACID,BLOOD; Status:Active; Requested [id number];     Perform:[facility]; PTY:89TJZ6994;ECKYOKM;REV:WXEKSJNV, Postgastrectomy malabsorption, Status post gastric bypass for obesity; Ordered By:Chely Mchugh;   · (1) VITAMIN B1, WHOLE BLOOD; Status:Active; Requested HNR:[id number]; Perform:[facility]; NPA:[id number];EFAHCPX;TFV:MVSRTQBM, Postgastrectomy malabsorption, Status post gastric bypass for obesity; Ordered By:Buck Mchugh;   · (1) VITAMIN B12; Status:Active; Requested SIM:[id number]; Perform:[facility]; YXI:[id number];GYMKDVS;FKT:YYLLDHGW, Postgastrectomy malabsorption, Status post gastric bypass for obesity; Ordered By:Buck Mchugh; Helicobacter pylori (H  pylori) infection    · (1) HELICOBACTER PYLORI ANTIGEN, STOOL; Status:Active; Requested CEB:[id number]; Perform:[facility]; ZGR:[id number];KIEGFSJ;H:ZTPOVIPMLOXH pylori (H  pylori) infection; Ordered By:Chely Mchugh;    Discussion/Summary      Follow-up in 6 months  Follow diet as discussed  Get lab work done prior to your next office visit  Follow vitamin/mineral recommendations as reviewed with you  Exercise as tolerated  our office if you have any problems with abdominal pain especially if associated with fever, chills, nausea, vomiting, or any other concerns  you experience the worst headache of your life, difficulty speaking, numbness/tingling down arms of legs-call 911 to get to ER  your request will make referral to [address] [place] [address]  I will have our staff call your PCP's office to relay concerns today as well  with them what blood pressure medication dose you should be taking  Recommend that you take medication as prescribed and highly recommend that you invest in a blood pressure cuff to check readings to review with your PCP   Blood pressure should generally be less than 140 (top number) and less than 90 (bottom number)   your Cpap regularly as tolerated-this MAY help with sleep-but you may need this re-evaluated by neurologist or sleep medicine specialist      s/p lap sleeve gastrectomy- 2  h/o headache 3  sleep apnea 4  HTN year old Status post laparoscopic sleeve gastrectomy by Dr Helio Singleton 6/20/2017  is here for routine follow-up  Has no complaints today related to his surgery  He notes intermittent headaches, memory issues, sleeping difficulties and problems with speech at times  he indicates he recently saw his PCP for these complaints and was advised to make consultation with Neurologist for further evaluation  he reports he would like to see [address] [place] [address] neurologist and indicates he called them but needs a referral   reviewed his case with Dr eHlio Singleton and he advised to make the referral for him based on his request  I did so today and had staff call his PCP's office to relay his concerns and that we would make referral on his behalf  call was placed by Alexis Pedersen) to his PCP's office on my behalf today   with NO complaints today-no headache, no speech difficulties, no numbness or tingling  24 hour diet recall was obtained from the patient tolerating a regular diet eating at least 70 grams of protein per day 30/60 minute rule with liquids at least 64 ounces of fluid per day exercising regularly by walking and asked about increase in intensity-advised since he is experiencing some neurological complaints at times he should continue to walk and review his exercises with neurologist to see what they would advise has lost 51% excess body weight loss-as expected for this time frame  note-he is c/o sleep disturbances as well and is not consistently using Cpap so this may play at least part in some of his symptoms as well-advised he should review this with neurologist/may need repeat sleep evaluation as well    gave him written instructions secondary to his c/o memory issues (which he reports predates his surgery) on any concerning symptoms which would require 911/emergency evaluation in the interim  was well-controlled in the office today   notes he was recently advised by PCP to take 1/2 of his bp medication but then indicates he was not sure-so advised that this needs to be clarified with his PCP  does not have a bp cuff at home and highly recommended that he get one and gave him recommendations for readings to be below 140/ 90 but he should get more specific advise by his PCP as needed  Asked our MA to also relay to PCP that patient was confused about his antihypertensive medication dose   Malabsorption- pateint is at risk for malabsorption of vitamins/minerals secondary to malabsorption from her procedure and restriction of intakes current supplements and advised on same has been taking 4 bariatric fusion, one additional 500 mg calcium and 2000 IU vitamin D3 daily and also notes taking s B complex-meeting estimated vitamin/mineral needs based on October labs  repeat his B vitamin levels, ferritin, cbc now but it is VERY doubtful that any of his symptoms are related to a deficiency since he has not changed supplements and is actually taking extra B vitamins  to hold his mvi and B complex for 5 days prior to getting repeat labs done-but then to restart them immediately  will advise him of results via letter /phone call if needed  GERD- he has been able to stop his PPI and denies any symptoms/controlled off medication     h pylori- patient reports h/o h pylori which was treated some time pre-op by his pcp but had treatment as well by us post-op  with history of recurrence will check stool antigen now to assure irradcaiton- lab slip for stool study being mailed to patient   also fax office visit today with attention to his PCP for his [id number]-[id number] his PCP's office and they indicate they do not have an incoming fax-will mail office visit and information was conveyed to staff at PCP's office today  The patient has the current Goals: Continued weight loss with good nutrition intakes vitamin/mineral levels as tolerated neuro symptoms   The patent has the current Barriers: Memory issues  Patient is able to Self-Care  Educational resources provided: Written instructions provided for this visit  Possible side effects of new medications were reviewed with the patient/guardian today  The treatment plan was reviewed with the patient/guardian  The patient/guardian understands and agrees with the treatment plan    He was advised to follow up due to malabsorption risks  Chief Complaint   Patient in office today for 3rd post op visit  He is complaining of forgetfulness, headaches and issues with his speech /not sleeping well-not consistently using his Cpap He has contacted a neurologist at [facility] but reports he needs a referral for this  He is walking for exercise and taking vitamins  Post-Op   Post-Op Bariatric Surgery:      PINKY TANG is status post lap sleeve procedure,-- performed on 6/20/2017--   by Dr Clark Perez  HPI: today's weight is 244 lb pounds,-- today's BMI is 35-- and-- his total weight loss is 51% excess body weight loss pounds  The patient reports no nausea,-- no vomiting,-- no constipation,-- no diarrhea,-- no chest pain-- and-- no abdominal pain  Diet and Exercise: Diet history reviewed and discussed with the patient  Weight loss/gains to date discussed with the patient  Supplements: multivitamins,-- calcium-- and-- extra vitamin d and b complex  PE:      Abdominal exam: soft-- and-- no incisional hernia  Assessment:      Post-op, the patient see discussion  Plan: Activity restrictions: continue with walking/ if ok with neurologist-increase exercise as tolerated  Instructions / Recommendations: recommended a daily protein intake of  grams,-- vitamin supplement(s) recommended,-- mineral supplement(s) recommended,-- diet as discussed-- and-- instructed to call the office for concerns, questions, or problems        The patient was instructed to follow up in 6 months,-- Will make referral to [facility] per your request       Review of Systems        Constitutional: planned weight loss, but-- not feeling poorly  Cardiovascular: no chest pain-- and-- no palpitations  Respiratory: no shortness of breath-- and-- no wheezing  Gastrointestinal: no abdominal pain,-- no nausea,-- no vomiting,-- no constipation-- and-- no diarrhea  Neurological: headache-- and-- no active headache today, but-- no numbness,-- no tingling-- and-- no limb weakness  Psychiatric: no anxiety-- and-- no depression  Active Problems   1  Acid reflux (530 81) (K21 9)   2  Arthritis (716 90) (M19 90)   3  Asthma (493 90) (J45 909)   4  Benign essential hypertension (401 1) (I10)   5  Blood tests prior to treatment or procedure (V72 63) (Z01 812)   6  Bowel disease (569 9) (K63 9)   7  Diabetes mellitus (250 00) (E11 9)   8  Helicobacter pylori (H  pylori) infection (041 86) (A04 8)   9  History of back problems   10  Hypertriglyceridemia (272 1) (E78 1)   11  Lower urinary tract symptoms (LUTS) (788 99) (R39 9)   12  Morbid obesity (278 01) (E66 01)   13  Obesity (BMI 30-39 9) (278 00) (E66 9)   14  Other constipation (564 09) (K59 09)   15  Postgastrectomy malabsorption (579 3) (K91 2,Z90 3)   16  Prostate cancer (185) (C61)   17  Sleep apnea (780 57) (G47 30)   18  Status post gastric bypass for obesity (V45 86) (Z98 84)    Social History    · Denied: History of Alcohol use   · Assistive Devices: Cane   ·    · Former smoker (E12 82) (P53 779)   · No alcohol use   · No drug use  The social history was reviewed and updated today  Current Meds    1  Albuterol-Ipratropium 103-18 MCG/ACT AERO; INHALE 2 PUFFS Every 6 hours PRN; Therapy: (Recorded:32Pkz0298) to Recorded   2  B Complex TABS; Therapy: (Recorded:31Oct2016) to Recorded   3  Calcium CHEW;     Therapy: (Rimma Laws) to Recorded   4  Lisinopril 10 MG Oral Tablet; TAKE 1 TABLET DAILY;      Therapy: (Lemmie Palin) to Recorded   5  Multiple Vitamin CHEW;     Therapy: (Recorded:29Jun2017) to Recorded   6  Singulair 10 MG Oral Tablet; Therapy: (Recorded:31Oct2016) to Recorded   7  Spiriva HandiHaler 18 MCG Inhalation Capsule; INHALE CONTENTS OF 1 CAPSULE     ONCE DAILY; Therapy: (Lemmie Palin) to Recorded   8  Tamsulosin HCl - 0 4 MG Oral Capsule; take 1 capsule daily; Therapy: 57XRV2103 to (Evaluate:70Dqs2865)  Requested for: 40DKF4566; Last     Rx:99Pgg8932 Ordered   9  Tricor TABS; TAKE 145 MG Daily; Therapy: (Lemmie Palin) to Recorded   10  Vitamin C CAPS; Therapy: (Recorded:31Oct2016) to Recorded     The medication list was reviewed and updated today  Allergies   1  No Known Drug Allergies  2  Seasonal    Vitals    Recorded: 37PPH2023 10:37AM   Temperature 98 F   Heart Rate 70   Respiration 20   Systolic 206   Diastolic 82   Height 5 ft 10 in   Weight 244 lb    BMI Calculated 35 01   BSA Calculated 2 27     Physical Exam        Constitutional      General appearance: No acute distress, well appearing and well nourished  Eyes bilateral conjunctiva without pallor  Ears, Nose, Mouth, and Throat mucous membranes moist       Pulmonary      Respiratory effort: No increased work of breathing or signs of respiratory distress  Auscultation of lungs: Clear to auscultation, equal breath sounds bilaterally, no wheezes, no rales, no rhonci  Cardiovascular      Auscultation of heart: Normal rate and rhythm, normal S1 and S2, without murmurs  Abdomen soft, no incisional hernias appreciated  Musculoskeletal      Gait and station: Abnormal  -- uses cane for ambulation        Psychiatric      Orientation to person, place and time: Normal        Mood and affect: Normal           Results/Data   October 2017 labs reviewed again with him         Future Appointments      Date/Time Provider Specialty Site   05/29/2018 10:30 AM [facility] Gallegos [facility]   07/12/2018 10:00 AM Simon Mchugh [facility] General Surgery [facility]     Signatures    Electronically signed by : Zachary Reina [facility]; Jan 11 2018 12:10PM EST                       (Author)     Electronically signed by : Zachary Reina [facility]; Jan 11 2018 12:55PM EST                       (Author)     Electronically signed by :  ZEINAB Garsia ; Jan 20 2018  8:38AM EST

## 2018-01-23 VITALS
RESPIRATION RATE: 20 BRPM | HEART RATE: 70 BPM | BODY MASS INDEX: 34.93 KG/M2 | SYSTOLIC BLOOD PRESSURE: 122 MMHG | DIASTOLIC BLOOD PRESSURE: 82 MMHG | TEMPERATURE: 98 F | HEIGHT: 70 IN | WEIGHT: 244 LBS

## 2018-01-23 VITALS
SYSTOLIC BLOOD PRESSURE: 104 MMHG | DIASTOLIC BLOOD PRESSURE: 62 MMHG | BODY MASS INDEX: 34.83 KG/M2 | HEART RATE: 66 BPM | HEIGHT: 70 IN | WEIGHT: 243.25 LBS

## 2018-02-16 ENCOUNTER — APPOINTMENT (OUTPATIENT)
Dept: LAB | Facility: HOSPITAL | Age: 53
End: 2018-02-16
Attending: PSYCHIATRY & NEUROLOGY
Payer: COMMERCIAL

## 2018-02-16 ENCOUNTER — TRANSCRIBE ORDERS (OUTPATIENT)
Dept: ADMINISTRATIVE | Facility: HOSPITAL | Age: 53
End: 2018-02-16

## 2018-02-16 ENCOUNTER — HOSPITAL ENCOUNTER (OUTPATIENT)
Dept: MRI IMAGING | Facility: HOSPITAL | Age: 53
Discharge: HOME | End: 2018-02-16
Attending: PSYCHIATRY & NEUROLOGY
Payer: COMMERCIAL

## 2018-02-16 ENCOUNTER — HOSPITAL ENCOUNTER (OUTPATIENT)
Dept: NEUROLOGY | Facility: HOSPITAL | Age: 53
Discharge: HOME | End: 2018-02-16
Attending: PSYCHIATRY & NEUROLOGY
Payer: COMMERCIAL

## 2018-02-16 DIAGNOSIS — R41.3 MEMORY LOSS: ICD-10-CM

## 2018-02-16 DIAGNOSIS — R51.9 FACIAL PAIN: ICD-10-CM

## 2018-02-16 DIAGNOSIS — R41.3 OTHER AMNESIA: ICD-10-CM

## 2018-02-16 DIAGNOSIS — R51.9 FACIAL PAIN: Primary | ICD-10-CM

## 2018-02-16 LAB
ERYTHROCYTE [SEDIMENTATION RATE] IN BLOOD: 4 MM/HOUR (ref 0–10)
TSH SERPL DL<=0.05 MIU/L-ACNC: 0.49 UIU/ML (ref 0.36–3.74)

## 2018-02-16 PROCEDURE — 86038 ANTINUCLEAR ANTIBODIES: CPT

## 2018-02-16 PROCEDURE — 95819 EEG AWAKE AND ASLEEP: CPT

## 2018-02-16 PROCEDURE — 84443 ASSAY THYROID STIM HORMONE: CPT

## 2018-02-16 PROCEDURE — 36415 COLL VENOUS BLD VENIPUNCTURE: CPT

## 2018-02-16 PROCEDURE — 86618 LYME DISEASE ANTIBODY: CPT

## 2018-02-16 PROCEDURE — 95819 EEG AWAKE AND ASLEEP: CPT | Performed by: PSYCHIATRY & NEUROLOGY

## 2018-02-16 PROCEDURE — 76377 3D RENDER W/INTRP POSTPROCES: CPT

## 2018-02-16 PROCEDURE — 85652 RBC SED RATE AUTOMATED: CPT

## 2018-02-16 PROCEDURE — 70551 MRI BRAIN STEM W/O DYE: CPT

## 2018-02-18 LAB
B BURGDOR IGG SER IA-ACNC: 0.12
B BURGDOR IGM SER IA-ACNC: 0.18
RYE IGE QN: NEGATIVE

## 2018-02-26 NOTE — MISCELLANEOUS
Message  I called patient's family doctor per Tarun DUARTE in regards to him asking for a referral to neurology within [facility] for his headaches and memory issues  They are now aware of this and will also reach out to patient to clarify blood pressure medication dose being that he seemed confused on how much to take  Active Problems    1  Acid reflux (530 81) (K21 9)   2  Arthritis (716 90) (M19 90)   3  Asthma (493 90) (J45 909)   4  Benign essential hypertension (401 1) (I10)   5  Blood tests prior to treatment or procedure (V72 63) (Z01 812)   6  Bowel disease (569 9) (K63 9)   7  Diabetes mellitus (250 00) (E11 9)   8  Headache (784 0) (R51)   9  Helicobacter pylori (H  pylori) infection (041 86) (A04 8)   10  History of back problems   11  Hypertriglyceridemia (272 1) (E78 1)   12  Lower urinary tract symptoms (LUTS) (788 99) (R39 9)   13  Morbid obesity (278 01) (E66 01)   14  Obesity (BMI 30-39 9) (278 00) (E66 9)   15  Other constipation (564 09) (K59 09)   16  Postgastrectomy malabsorption (579 3) (K91 2,Z90 3)   17  Prostate cancer (185) (C61)   18  Sleep apnea (780 57) (G47 30)   19  Status post gastric bypass for obesity (V45 86) (Z98 84)    Current Meds   1  Albuterol-Ipratropium 103-18 MCG/ACT AERO; INHALE 2 PUFFS Every 6 hours PRN; Therapy: (Recorded:78Lue7763) to Recorded   2  B Complex TABS; Therapy: (Recorded:31Oct2016) to Recorded   3  Calcium CHEW;   Therapy: (Jason Bunch) to Recorded   4  Lisinopril 10 MG Oral Tablet; TAKE 1 TABLET DAILY; Therapy: (Love Villalobos) to Recorded   5  Multiple Vitamin CHEW;   Therapy: (Recorded:29Jun2017) to Recorded   6  Singulair 10 MG Oral Tablet (Montelukast Sodium); Therapy: (Recorded:31Oct2016) to Recorded   7  Spiriva HandiHaler 18 MCG Inhalation Capsule; INHALE CONTENTS OF 1 CAPSULE   ONCE DAILY; Therapy: (Love Villalobos) to Recorded   8  Tamsulosin HCl - 0 4 MG Oral Capsule (Flomax); take 1 capsule daily;    Therapy: 40LJF5955 to (Evaluate:28Tbg5317)  Requested for: 37PGJ1586; Last   Rx:[id number] Ordered   9  Tricor TABS (Fenofibrate); TAKE 145 MG Daily; Therapy: (Zigmund Reges) to Recorded   10  Vitamin C CAPS; Therapy: (Recorded:31Oct2016) to Recorded    Allergies    1  No Known Drug Allergies    2  Seasonal    Plan  Headache    · *1 -  NEUROLOGY Co-Management  * patent is s/p gastric sleeve surgery June 2017-saw PCP [facility]) for c/o headache, sleep disturbances  (has Cpap-inconsistent use), memory issues, slurred speech at times-no active  symptoms when seen in the bariatric office-wants to see  neurologist-please evaluate  and treat as needed/send results to his PCP/[facility]  Thank you  Status: Hold  For - Scheduling,Retrospective Authorization  Requested for: [id number]  Care Summary provided  : Yes  Headache, Postgastrectomy malabsorption, Status post gastric bypass for obesity    · (1) CBC/ PLT (NO DIFF); Status:Active; Requested IVO:96PHJ8021;    · (1) FERRITIN; Status:Active; Requested KAF:39UVP5061;    · (1) FOLATE; Status:Active; Requested ECR:[id number];    · (1) METHYLMALONIC ACID,BLOOD; Status:Active; Requested ZQY:75LYQ2152;    · (1) VITAMIN B1, WHOLE BLOOD; Status:Active; Requested NATHAN:[id number];    · (1) VITAMIN B12; Status:Active;  Requested [facility]:46WFE1566;     Signatures   Electronically signed by : Oracio Bran, ; Jan 11 2018 11:43AM EST                       (Author)

## 2018-03-13 RX ORDER — ALBUTEROL SULFATE 90 UG/1
AEROSOL, METERED RESPIRATORY (INHALATION) 2 TIMES DAILY
COMMUNITY
Start: 2009-08-28

## 2018-03-13 RX ORDER — NORTRIPTYLINE HYDROCHLORIDE 10 MG/1
1-3 CAPSULE ORAL
Refills: 5 | COMMUNITY
Start: 2018-02-15 | End: 2019-10-11 | Stop reason: ALTCHOICE

## 2018-03-13 RX ORDER — FLUNISOLIDE 0.25 MG/ML
SOLUTION NASAL AS NEEDED
COMMUNITY
Start: 2008-05-30

## 2018-03-13 RX ORDER — LORATADINE 10 MG/1
TABLET ORAL AS NEEDED
COMMUNITY

## 2018-03-13 RX ORDER — FLUTICASONE PROPIONATE 110 UG/1
2 AEROSOL, METERED RESPIRATORY (INHALATION) AS NEEDED
COMMUNITY
Start: 2017-09-07

## 2018-03-14 ENCOUNTER — LAB (OUTPATIENT)
Dept: LAB | Facility: CLINIC | Age: 53
End: 2018-03-14
Payer: COMMERCIAL

## 2018-03-14 ENCOUNTER — OFFICE VISIT (OUTPATIENT)
Dept: NEUROLOGY | Facility: CLINIC | Age: 53
End: 2018-03-14
Payer: COMMERCIAL

## 2018-03-14 VITALS
BODY MASS INDEX: 33.46 KG/M2 | DIASTOLIC BLOOD PRESSURE: 72 MMHG | WEIGHT: 233.2 LBS | SYSTOLIC BLOOD PRESSURE: 110 MMHG | HEART RATE: 66 BPM

## 2018-03-14 DIAGNOSIS — R41.3 MEMORY DIFFICULTY: Primary | ICD-10-CM

## 2018-03-14 DIAGNOSIS — K91.2 POSTSURGICAL MALABSORPTION, NOT ELSEWHERE CLASSIFIED (CODE): ICD-10-CM

## 2018-03-14 DIAGNOSIS — C61 PROSTATE CA (HCC): ICD-10-CM

## 2018-03-14 DIAGNOSIS — R93.89 ABNORMAL MRI: ICD-10-CM

## 2018-03-14 DIAGNOSIS — R51.9 HEADACHE: ICD-10-CM

## 2018-03-14 DIAGNOSIS — Z98.84 BARIATRIC SURGERY STATUS: ICD-10-CM

## 2018-03-14 DIAGNOSIS — R41.3 OTHER AMNESIA: ICD-10-CM

## 2018-03-14 DIAGNOSIS — R51.9 NONINTRACTABLE HEADACHE, UNSPECIFIED CHRONICITY PATTERN, UNSPECIFIED HEADACHE TYPE: ICD-10-CM

## 2018-03-14 LAB
ERYTHROCYTE [DISTWIDTH] IN BLOOD BY AUTOMATED COUNT: 14.2 % (ref 11.6–15.1)
FERRITIN SERPL-MCNC: 105 NG/ML (ref 8–388)
FOLATE SERPL-MCNC: >20 NG/ML (ref 3.1–17.5)
HCT VFR BLD AUTO: 39.2 % (ref 36.5–49.3)
HGB BLD-MCNC: 12.8 G/DL (ref 12–17)
MCH RBC QN AUTO: 29 PG (ref 26.8–34.3)
MCHC RBC AUTO-ENTMCNC: 32.7 G/DL (ref 31.4–37.4)
MCV RBC AUTO: 89 FL (ref 82–98)
PLATELET # BLD AUTO: 187 THOUSANDS/UL (ref 149–390)
PMV BLD AUTO: 11.1 FL (ref 8.9–12.7)
RBC # BLD AUTO: 4.41 MILLION/UL (ref 3.88–5.62)
VIT B12 SERPL-MCNC: 1054 PG/ML (ref 100–900)
WBC # BLD AUTO: 4.03 THOUSAND/UL (ref 4.31–10.16)

## 2018-03-14 PROCEDURE — 36415 COLL VENOUS BLD VENIPUNCTURE: CPT

## 2018-03-14 PROCEDURE — 82746 ASSAY OF FOLIC ACID SERUM: CPT

## 2018-03-14 PROCEDURE — 86618 LYME DISEASE ANTIBODY: CPT

## 2018-03-14 PROCEDURE — 82728 ASSAY OF FERRITIN: CPT

## 2018-03-14 PROCEDURE — 83918 ORGANIC ACIDS TOTAL QUANT: CPT

## 2018-03-14 PROCEDURE — 85027 COMPLETE CBC AUTOMATED: CPT

## 2018-03-14 PROCEDURE — 82607 VITAMIN B-12: CPT

## 2018-03-14 PROCEDURE — 99214 OFFICE O/P EST MOD 30 MIN: CPT | Performed by: PSYCHIATRY & NEUROLOGY

## 2018-03-14 PROCEDURE — 84425 ASSAY OF VITAMIN B-1: CPT

## 2018-03-14 NOTE — PROGRESS NOTES
Suzy Santizo is a 47 y o  male  Returns in follow-up with history of headaches and memory difficulty    Assessment:  1  Memory difficulty    2  Nonintractable headache, unspecified chronicity pattern, unspecified headache type    3  Prostate CA ([address] )    4  Abnormal MRI        Plan:   continue current dosage of nortriptyline  Bone scan  Follow-up 3 months    Discussion:   Josiah's symptoms of headaches and memory difficulty have improved since here last   Aside from some morning sleepiness he has tolerated the medication without adverse effect  He understands that the dose can be increased if his symptoms become more prominent  Will obtain bone scan given irregularities noted in the bone marrow on MRI brain and history of prostate cancer  I will see him back in follow-up in a few months      Subjective:    HPI   Jean-Pierre Meadows reports overall he is pleased with his symptoms of headache and memory disturbance  He states that the frequency and severity of his headaches have improved  He states he now gets a headache a couple of times a week and is not as severe in intensity and does not last through the day  He also feels that his memory is doing a bit better  He states that when he goes into her room he often remembers what he went in for  He states that he has a little bit sleepy when he wakes up in the morning but otherwise has tolerated the nortriptyline without adverse effect  He is currently taking 20 mg daily  EEG was normal   Blood work  Was normal   Neuro quant MRI of the brain demonstrated no intracranial abnormalities, irregularities were noted in the cervical bone marrow which could be degenerative in nature, rule out metastatic given his history of prostate cancer    Bone scan was recommended      Past Medical History:   Diagnosis Date    Acid reflux     Allergy to cats     Arthritis     Asthma     Back pain     Breathing difficulty     approx July 2016 pt was having prostate surgery and surgery not completed as he developed "severe breathing problems"    Constipation     CPAP (continuous positive airway pressure) dependence     Diabetes mellitus (HCC)     Enlarged thyroid     Headache     High cholesterol     History of radiation therapy     last treatment 12/2016    Hypertension     Hypertriglyceridemia     Knee pain, bilateral     Memory loss     Muscle weakness     Obesity     Prostate cancer (HCC)     Risk for falls     Seasonal allergies     Shortness of breath     Sleep apnea     cpap    Unsteady gait     "knees buckle"    Use of cane as ambulatory aid     Wears glasses        Family History:  Family History   Problem Relation Age of Onset    Arthritis Mother     Prostate cancer Father        Past Surgical History:  Past Surgical History:   Procedure Laterality Date    COLONOSCOPY      ESOPHAGOGASTRODUODENOSCOPY      ESOPHAGOGASTRODUODENOSCOPY N/A 6/20/2017    Procedure: ESOPHAGOGASTRODUODENOSCOPY (EGD); Surgeon: Talya Carlos MD;  [facility]: [facility];  Service: Bariatrics    NO PAST SURGERIES      NE LAP, ANNETTE RESTRICT PROC, LONGITUDINAL GASTRECTOMY N/A 6/20/2017    Procedure: GASTRECTOMY SLEEVE LAPAROSCOPIC;  Surgeon: Talya Carlos MD;  [facility];  Service: Bariatrics       Social History:   reports that he has quit smoking  He has never used smokeless tobacco  He reports that he does not drink alcohol or use drugs      Allergies:  Pollen extract      Current Outpatient Prescriptions:     albuterol (PROAIR HFA) 90 mcg/act inhaler, Inhale, Disp: , Rfl:     Ascorbic Acid (VITAMIN C PO), Take 1 capsule by mouth 2 (two) times a day, Disp: , Rfl:     b complex vitamins tablet, Take 1 tablet by mouth daily, Disp: , Rfl:     BREO ELLIPTA 200-25 MCG/INH inhaler, Inhale 1 puff daily, Disp: , Rfl: 1    CALCIUM CARBONATE PO, Take 1 tablet by mouth 2 (two) times a day, Disp: , Rfl:     docusate sodium (COLACE) 100 mg capsule, Take 100 mg by mouth every other day, Disp: , Rfl:     fenofibrate (TRICOR) 145 mg tablet, Take 145 mg by mouth daily  , Disp: , Rfl:     flunisolide (NASALIDE) 25 MCG/ACT (0 025%) SOLN, into each nostril, Disp: , Rfl:     fluticasone (FLOVENT HFA) 110 MCG/ACT inhaler, Inhale 2 puffs, Disp: , Rfl:     ipratropium-albuterol (COMBIVENT)  mcg/act inhaler, Inhale 2 puffs every 6 (six) hours as needed for wheezing , Disp: , Rfl:     lisinopril (ZESTRIL) 5 mg tablet, Take 5 mg by mouth daily  , Disp: , Rfl:     loratadine (CLARITIN) 10 mg tablet, daily, Disp: , Rfl:     montelukast (SINGULAIR) 10 mg tablet, Take 10 mg by mouth daily at bedtime  , Disp: , Rfl:     MULTIPLE VITAMIN PO, Take 1 tablet by mouth daily, Disp: , Rfl:     nortriptyline (PAMELOR) 10 mg capsule, Take 1-3 capsules by mouth daily at bedtime, Disp: , Rfl: 5    Potassium 99 MG TABS, Take 99 mg by mouth daily, Disp: , Rfl:     tamsulosin (FLOMAX) 0 4 mg, Take 0 4 mg by mouth daily with dinner, Disp: , Rfl:     tiotropium (SPIRIVA) 18 mcg inhalation capsule, Place 18 mcg into inhaler and inhale daily  , Disp: , Rfl:     enoxaparin (LOVENOX) 40 mg/0 4 mL, Inject 0 4 mL under the skin every 24 hours for 13 days, Disp: 5 2 mL, Rfl: 0    omeprazole (PriLOSEC) 20 mg delayed release capsule, Take 1 capsule by mouth daily, Disp: 30 capsule, Rfl: 0     I have reviewed the past medical, social and family history, current medications, allergies, vitals, review of systems and updated this information as appropriate today     Objective:    Vitals:  Blood pressure 110/72, pulse 66, weight 106 kg (233 lb 3 2 oz)  Physical Exam    Neurological Exam   GENERAL:  Well-developed well-nourished man in no acute distress  HEENT/NECK: Head is atraumatic normocephalic, neck is supplebruit  NEUROLOGIC:  Mental Status: Awake and alert without aphasia  He scored 26/30 on [place]  Cranial Nerves: Extraocular movements are full   Face is symmetrical  Coordination:  Gait is stable            ROS:    Review of Systems   Constitutional: Negative for activity change, appetite change, chills, fatigue and fever  HENT: Positive for sinus pressure  Negative for congestion, ear discharge, ear pain, facial swelling, mouth sores, nosebleeds, postnasal drip, sinus pain, sore throat, tinnitus, trouble swallowing and voice change  Eyes: Negative for pain, discharge and visual disturbance  Respiratory: Negative  Cardiovascular: Negative  Gastrointestinal: Positive for constipation  Negative for abdominal distention, abdominal pain, blood in stool, diarrhea, nausea and vomiting  Endocrine: Negative for cold intolerance and heat intolerance  Genitourinary: Negative for difficulty urinating, flank pain, frequency and urgency  Musculoskeletal: Positive for arthralgias and back pain  Negative for gait problem, neck pain and neck stiffness  Skin: Negative for rash and wound  Allergic/Immunologic: Negative for environmental allergies and food allergies  Neurological: Positive for light-headedness and headaches  Negative for dizziness, tremors, seizures, syncope, speech difficulty, weakness and numbness  Hematological: Negative  Psychiatric/Behavioral: Positive for sleep disturbance  Negative for agitation, decreased concentration and dysphoric mood  The patient is not nervous/anxious and is not hyperactive

## 2018-03-15 LAB
B BURGDOR IGG SER IA-ACNC: 0.12
B BURGDOR IGM SER IA-ACNC: 0.28

## 2018-03-18 LAB
METHYLMALONATE SERPL-SCNC: 136 NMOL/L (ref 0–378)
VIT B1 BLD-SCNC: 125.9 NMOL/L (ref 66.5–200)

## 2018-03-19 ENCOUNTER — HOSPITAL ENCOUNTER (OUTPATIENT)
Dept: NUCLEAR MEDICINE | Facility: HOSPITAL | Age: 53
Discharge: HOME | End: 2018-03-19
Attending: PSYCHIATRY & NEUROLOGY
Payer: COMMERCIAL

## 2018-03-19 DIAGNOSIS — C61 PROSTATE CA (HCC): ICD-10-CM

## 2018-03-19 DIAGNOSIS — R93.89 ABNORMAL MRI: ICD-10-CM

## 2018-03-19 PROCEDURE — 78306 BONE IMAGING WHOLE BODY: CPT

## 2018-03-19 PROCEDURE — A9503 TC99M MEDRONATE: HCPCS

## 2018-05-03 ENCOUNTER — APPOINTMENT (OUTPATIENT)
Dept: LAB | Facility: HOSPITAL | Age: 53
End: 2018-05-03
Payer: COMMERCIAL

## 2018-05-03 DIAGNOSIS — C61 MALIGNANT NEOPLASM OF PROSTATE (HCC): ICD-10-CM

## 2018-05-03 LAB — PSA SERPL-MCNC: 1 NG/ML (ref 0–4)

## 2018-05-03 PROCEDURE — 36415 COLL VENOUS BLD VENIPUNCTURE: CPT

## 2018-05-03 PROCEDURE — 84153 ASSAY OF PSA TOTAL: CPT

## 2018-05-08 ENCOUNTER — TRANSCRIBE ORDERS (OUTPATIENT)
Dept: ADMINISTRATIVE | Facility: HOSPITAL | Age: 53
End: 2018-05-08

## 2018-05-08 DIAGNOSIS — R06.83 SNORES: Primary | ICD-10-CM

## 2018-05-08 DIAGNOSIS — G47.30 SLEEP APNEA, UNSPECIFIED TYPE: ICD-10-CM

## 2018-05-10 ENCOUNTER — TELEPHONE (OUTPATIENT)
Dept: BARIATRICS | Facility: CLINIC | Age: 53
End: 2018-05-10

## 2018-05-14 DIAGNOSIS — C61 MALIGNANT NEOPLASM OF PROSTATE (HCC): ICD-10-CM

## 2018-05-17 ENCOUNTER — TELEPHONE (OUTPATIENT)
Dept: SLEEP CENTER | Facility: CLINIC | Age: 53
End: 2018-05-17

## 2018-05-17 NOTE — TELEPHONE ENCOUNTER
Mailed letter to ordering doctor's office along with H&P form that needs to be completed prior to patient coming in for sleep study scheduled on 7/12/18

## 2018-05-17 NOTE — TELEPHONE ENCOUNTER
Spoke with ordering doctor's office (Dr Antonio Nolasco) on 5/09/18  The office does not have a fax number  Per the office, they will fax over office notes along with sleep study order

## 2018-05-29 ENCOUNTER — OFFICE VISIT (OUTPATIENT)
Dept: UROLOGY | Facility: CLINIC | Age: 53
End: 2018-05-29
Payer: COMMERCIAL

## 2018-05-29 VITALS
SYSTOLIC BLOOD PRESSURE: 118 MMHG | HEIGHT: 70 IN | DIASTOLIC BLOOD PRESSURE: 80 MMHG | WEIGHT: 233 LBS | BODY MASS INDEX: 33.36 KG/M2 | HEART RATE: 62 BPM

## 2018-05-29 DIAGNOSIS — C61 PROSTATE CANCER (HCC): Primary | ICD-10-CM

## 2018-05-29 DIAGNOSIS — R39.9 LOWER URINARY TRACT SYMPTOMS: ICD-10-CM

## 2018-05-29 PROCEDURE — 99213 OFFICE O/P EST LOW 20 MIN: CPT | Performed by: PHYSICIAN ASSISTANT

## 2018-05-29 RX ORDER — TAMSULOSIN HYDROCHLORIDE 0.4 MG/1
0.4 CAPSULE ORAL 2 TIMES DAILY
Qty: 60 CAPSULE | Refills: 6 | Status: SHIPPED | OUTPATIENT
Start: 2018-05-29 | End: 2019-01-10 | Stop reason: SDUPTHER

## 2018-05-29 RX ORDER — PANTOPRAZOLE SODIUM 40 MG/1
40 TABLET, DELAYED RELEASE ORAL DAILY
Refills: 0 | COMMUNITY
Start: 2018-05-08 | End: 2019-10-11 | Stop reason: ALTCHOICE

## 2018-05-29 RX ORDER — TRAMADOL HYDROCHLORIDE 50 MG/1
TABLET ORAL
Status: ON HOLD | COMMUNITY
End: 2020-12-08 | Stop reason: ALTCHOICE

## 2018-05-29 NOTE — PROGRESS NOTES
1  Prostate cancer ([facility] [address] )  PSA Total, Diagnostic   2  Lower urinary tract symptoms  tamsulosin (FLOMAX) 0 4 mg         Assessment and plan:       1  [place] prostate cancer status post external beam radiation (1/9/17) managed by Dr Mary Ann Jackson  - reviewed with the patient that his PSA is 1 0  - we will continue to observe to ensure no evidence of recurrence  - follow up in 6 months with PSA prior to visit  2  lower urinary tract symptoms  - Patient has noticed an improvement with LUTS on tamsulosin BID  He will continue to take this daily  [id number], PA-C    Chief Complaint     Chief Complaint   Patient presents with    Prostate Cancer         History of Present Illness     Nick Jiang is a 47 y o  male patient of Dr Mary Ann Jackson with a history of [place] 7 low-volume prostate cancer status post external beam radiation (1/9/17) presenting for 6 month follow-up  He was previously under the care of at [facility]  He has a positive family history of prostate cancer in his father who was diagnosed in his late 76s  Patient's PSA was 4 9 (1/12/16) which prompted his prostate biopsy  Pathology review at [facility] confirmed 2 foci of [place] 7 disease  He had since elected to proceed with Leodis Felling prostatectomy (8/4/16)  Surgery had to be aborted secondary to difficulty with ventilation while in the Trendelenburg position  Patient has since completed external beam radiation therapy on 1/9/17  His most recent PSA is 1 0 (5/3/18), previously 0 6 (10/4/17)  Patient does have LUTS  He takes tamsulosin with moderate control  Previously tried tamsulosin BID dosing, however ran out of refills so has only been doing once a day dosing at this time  Denies any dysuria, gross hematuria, suprapubic pressure, flank pain, or bone pain  Patient has recently undergone bariatric surgery and continues to lose weight       Laboratory     Lab Results   Component Value Date    CREATININE 1 04 10/04/2017 Lab Results   Component Value Date    PSA 1 0 05/03/2018    PSA 0 6 10/04/2017     Review of Systems     Review of Systems   Constitutional: Negative for activity change, appetite change, chills, diaphoresis, fatigue, fever and unexpected weight change  Respiratory: Negative for chest tightness and shortness of breath  Cardiovascular: Negative for chest pain, palpitations and leg swelling  Gastrointestinal: Negative for abdominal distention, abdominal pain, constipation, diarrhea, nausea and vomiting  Genitourinary: Negative for decreased urine volume, difficulty urinating, dysuria, enuresis, flank pain, frequency, genital sores, hematuria and urgency  Musculoskeletal: Negative for back pain, gait problem and myalgias  Skin: Negative for color change, pallor, rash and wound  Psychiatric/Behavioral: Negative for behavioral problems  The patient is not nervous/anxious  Allergies     Allergies   Allergen Reactions    Pollen Extract        Physical Exam     Physical Exam   Constitutional: He is oriented to person, place, and time  He appears well-developed and well-nourished  No distress  HENT:   Head: Normocephalic and atraumatic  Eyes: Conjunctivae are normal    Neck: Normal range of motion  No tracheal deviation present  Pulmonary/Chest: Effort normal    Musculoskeletal: He exhibits no edema or deformity  Ambulates with cane assistance   Neurological: He is alert and oriented to person, place, and time  Skin: Skin is warm and dry  No rash noted  He is not diaphoretic  No erythema  Psychiatric: He has a normal mood and affect   His behavior is normal          Vital Signs     Vitals:    05/29/18 1038   BP: 118/80   Pulse: 62   Weight: 106 kg (233 lb)   Height: 5' 10" (1 778 m)         Current Medications       Current Outpatient Prescriptions:     albuterol (PROAIR HFA) 90 mcg/act inhaler, Inhale, Disp: , Rfl:     Ascorbic Acid (VITAMIN C PO), Take 1 capsule by mouth 2 (two) times a day, Disp: , Rfl:     b complex vitamins tablet, Take 1 tablet by mouth daily, Disp: , Rfl:     BREO ELLIPTA 200-25 MCG/INH inhaler, Inhale 1 puff daily, Disp: , Rfl: 1    CALCIUM CARBONATE PO, Take 1 tablet by mouth 2 (two) times a day, Disp: , Rfl:     docusate sodium (COLACE) 100 mg capsule, Take 100 mg by mouth every other day, Disp: , Rfl:     fenofibrate (TRICOR) 145 mg tablet, Take 145 mg by mouth daily  , Disp: , Rfl:     flunisolide (NASALIDE) 25 MCG/ACT (0 025%) SOLN, into each nostril, Disp: , Rfl:     fluticasone (FLOVENT HFA) 110 MCG/ACT inhaler, Inhale 2 puffs, Disp: , Rfl:     ipratropium-albuterol (COMBIVENT)  mcg/act inhaler, Inhale 2 puffs every 6 (six) hours as needed for wheezing , Disp: , Rfl:     lisinopril (ZESTRIL) 5 mg tablet, Take 5 mg by mouth daily  , Disp: , Rfl:     loratadine (CLARITIN) 10 mg tablet, daily, Disp: , Rfl:     montelukast (SINGULAIR) 10 mg tablet, Take 10 mg by mouth daily at bedtime  , Disp: , Rfl:     MULTIPLE VITAMIN PO, Take 1 tablet by mouth daily, Disp: , Rfl:     nortriptyline (PAMELOR) 10 mg capsule, Take 1-3 capsules by mouth daily at bedtime, Disp: , Rfl: 5    omeprazole (PriLOSEC) 20 mg delayed release capsule, Take 1 capsule by mouth daily, Disp: 30 capsule, Rfl: 0    pantoprazole (PROTONIX) 40 mg tablet, Take 40 mg by mouth daily, Disp: , Rfl: 0    Potassium 99 MG TABS, Take 99 mg by mouth daily, Disp: , Rfl:     tamsulosin (FLOMAX) 0 4 mg, Take 1 capsule (0 4 mg total) by mouth 2 (two) times a day, Disp: 60 capsule, Rfl: 6    traMADol (ULTRAM) 50 mg tablet, tramadol 50 mg tablet, Disp: , Rfl:     enoxaparin (LOVENOX) 40 mg/0 4 mL, Inject 0 4 mL under the skin every 24 hours for 13 days, Disp: 5 2 mL, Rfl: 0    tiotropium (SPIRIVA) 18 mcg inhalation capsule, Place 18 mcg into inhaler and inhale daily  , Disp: , Rfl:       Active Problems     Patient Active Problem List   Diagnosis    [facility]    Obesity    Headache    Memory difficulty         Past Medical History     Past Medical History:   Diagnosis Date    Acid reflux     Allergy to cats     Arthritis     Asthma     Back pain     Breathing difficulty     approx July 2016 pt was having prostate surgery and surgery not completed as he developed "severe breathing problems"    Constipation     CPAP (continuous positive airway pressure) dependence     Diabetes mellitus ([address] )     Enlarged thyroid     Headache     High cholesterol     History of radiation therapy     last treatment 12/2016    Hypertension     Hypertriglyceridemia     Knee pain, bilateral     Memory loss     Muscle weakness     Obesity     Prostate cancer (HCC)     Risk for falls     Seasonal allergies     Shortness of breath     Sleep apnea     cpap    Unsteady gait     "knees buckle"    Use of cane as ambulatory aid     Wears glasses          Surgical History     Past Surgical History:   Procedure Laterality Date    COLONOSCOPY      ESOPHAGOGASTRODUODENOSCOPY      ESOPHAGOGASTRODUODENOSCOPY N/A 6/20/2017    Procedure: ESOPHAGOGASTRODUODENOSCOPY (EGD);   Surgeon: Aracelis Jones MD;  [facility];  Service: Bariatrics    NO PAST SURGERIES      [place] LAP, ANNETTE RESTRICT PROC, LONGITUDINAL GASTRECTOMY N/A 6/20/2017    Procedure: GASTRECTOMY SLEEVE LAPAROSCOPIC;  Surgeon: Aracelis Jones MD;  [facility];  Service: Bariatrics         Family History     Family History   Problem Relation Age of Onset    Arthritis Mother     Prostate cancer Father          Social History     Social History       Radiology

## 2018-06-14 ENCOUNTER — TELEPHONE (OUTPATIENT)
Dept: BARIATRICS | Facility: CLINIC | Age: 53
End: 2018-06-14

## 2018-06-14 ENCOUNTER — OFFICE VISIT (OUTPATIENT)
Dept: BARIATRICS | Facility: CLINIC | Age: 53
End: 2018-06-14
Payer: COMMERCIAL

## 2018-06-14 VITALS
SYSTOLIC BLOOD PRESSURE: 120 MMHG | HEIGHT: 70 IN | HEART RATE: 70 BPM | BODY MASS INDEX: 33.21 KG/M2 | WEIGHT: 232 LBS | TEMPERATURE: 98.2 F | DIASTOLIC BLOOD PRESSURE: 80 MMHG

## 2018-06-14 DIAGNOSIS — E55.9 VITAMIN D DEFICIENCY: ICD-10-CM

## 2018-06-14 DIAGNOSIS — K21.9 GASTROESOPHAGEAL REFLUX DISEASE, ESOPHAGITIS PRESENCE NOT SPECIFIED: ICD-10-CM

## 2018-06-14 DIAGNOSIS — K91.2 POSTSURGICAL MALABSORPTION: ICD-10-CM

## 2018-06-14 DIAGNOSIS — R51.9 NONINTRACTABLE HEADACHE, UNSPECIFIED CHRONICITY PATTERN, UNSPECIFIED HEADACHE TYPE: ICD-10-CM

## 2018-06-14 DIAGNOSIS — Z98.84 BARIATRIC SURGERY STATUS: Primary | ICD-10-CM

## 2018-06-14 DIAGNOSIS — I10 HYPERTENSION, UNSPECIFIED TYPE: ICD-10-CM

## 2018-06-14 DIAGNOSIS — E66.9 CLASS 1 OBESITY WITH SERIOUS COMORBIDITY AND BODY MASS INDEX (BMI) OF 33.0 TO 33.9 IN ADULT, UNSPECIFIED OBESITY TYPE: ICD-10-CM

## 2018-06-14 DIAGNOSIS — E55.9 VITAMIN D INSUFFICIENCY: ICD-10-CM

## 2018-06-14 PROBLEM — I15.9 SECONDARY HYPERTENSION: Status: ACTIVE | Noted: 2018-06-14

## 2018-06-14 PROBLEM — R12 HEARTBURN: Status: ACTIVE | Noted: 2018-06-14

## 2018-06-14 PROCEDURE — 99214 OFFICE O/P EST MOD 30 MIN: CPT | Performed by: PHYSICIAN ASSISTANT

## 2018-06-14 NOTE — PROGRESS NOTES
Assessment/Plan:    Bariatric surgery status    · Patient with good weight loss, 60%EWL, which is exactly as expected for 1 year post op sleeve gastrectomy  Advised patient that additional weight loss is possible and encouraged to get below an obese BMI  · Tolerating a regular diet-yes, struggles with dried chicken or meat  · Eating at least 60 grams of protein per day-yes, still using a protein shake daily for breakfast  · Following 30/60 minute rule with liquids-yes  · Drinking at least 64 ounces of fluid per day-yes  · Drinking carbonated beverages-no  · Sufficient exercise-yes, treadmill for 60 minutes at least 3x/week and walks dog daily  · Using NSAIDs regularly-No   · Using nicotine-no  · Using alcohol-no  · Supplements: Probiotic, Bariatric MVI, chewable calcium    Heartburn  -Reports reflux most days, especially worse with food triggers (red meat, spicy foods, eating late at night)  -PCP started him on Pantoprazole 40mg daily  -Discussed avoidance of food triggers, avoid eating late at night  -Try weaning PPI in about 3 months and use OTC H2 blocker prn    Postsurgical malabsorption  -At risk for malabsorption of vitamins/minerals secondary to malabsorption and restriction of intake from bariatric surgery  -Currently taking adequate postop bariatric surgery vitamin supplementation-yes  -Last set of limited bariatric labs completed on 03/2018 with noted elevated folate and B12 - safe and ok  Vitamin panel done in 10/2017 showed insufficient vitamin D  -Next set of complete bariatric labs ordered for approximately 2 weeks  -Patient received education about the importance of adhering to a lifelong supplementation regimen to avoid vitamin/mineral deficiencies     Hypertension  -Controlled with Lisinopril  -Reports good control at home       Diagnoses and all orders for this visit:    Bariatric surgery status  -     Copper Level; Future  -     Ferritin;  Future  -     Vitamin A; Future  -     Vitamin B1, whole blood; Future  -     Vitamin D 25 hydroxy; Future  -     Zinc; Future  -     Iron Saturation %; Future  -     Folate; Future  -     CBC and differential; Future  -     PTH, intact; Future  -     HEMOGLOBIN A1C W/ EAG ESTIMATION; Future  -     Vitamin B12; Future    Vitamin D deficiency  -     Vitamin D 25 hydroxy; Future    Postsurgical malabsorption  -     Copper Level; Future  -     Ferritin; Future  -     Vitamin A; Future  -     Vitamin B1, whole blood; Future  -     Vitamin D 25 hydroxy; Future  -     Zinc; Future  -     Iron Saturation %; Future  -     Folate; Future  -     CBC and differential; Future  -     PTH, intact; Future  -     HEMOGLOBIN A1C W/ EAG ESTIMATION; Future  -     Vitamin B12; Future    Hypertension, unspecified type    Class 1 obesity with serious comorbidity and body mass index (BMI) of 33 0 to 33 9 in adult, unspecified obesity type    Vitamin D insufficiency    Nonintractable headache, unspecified chronicity pattern, unspecified headache type          Subjective:      Patient ID: Jeremy Patel is a 47 y o  male  -s/p Vertical Sleeve Gastrectomy with Dr Leticia Ramos on 06/20/17  Presents to the office today for routine follow up  Tolerating diet without issues; denies N/V, dysphagia, except for occasional trouble with dried chicken and meat  Overall doing Well  Being followed by neurology and headaches are improving since starting nortiptyline  Initial: 316 7lbs   Current: 232lbs  EWL: 60% - right on track for 1 year out  [place]: current  Current BMI is Body mass index is 33 29 kg/m²  The following portions of the patient's history were reviewed and updated as appropriate: allergies, current medications, past family history, past medical history, past social history, past surgical history and problem list     Review of Systems   Constitutional: Negative for chills and fever  Unexpected weight change: planned weight loss  HENT: Negative for trouble swallowing      Respiratory: Negative for cough and shortness of breath  Cardiovascular: Negative for chest pain and palpitations  Gastrointestinal: Negative for abdominal pain, constipation, diarrhea, nausea and vomiting  Acid reflux, worse with food triggers and eating late at night   Musculoskeletal: Positive for back pain  Neurological: Positive for headaches (improving on medication)  Negative for dizziness  Psychiatric/Behavioral:        Denies anxiety and depression         Objective:      /80 (BP Location: Right arm, Patient Position: Sitting, Cuff Size: Standard)   Pulse 70   Temp 98 2 °F (36 8 °C) (Tympanic)   Ht 5' 10" (1 778 m)   Wt 105 kg (232 lb)   BMI 33 29 kg/m²          Physical Exam   Constitutional: He is oriented to person, place, and time  He appears well-developed and well-nourished  No distress  HENT:   Head: Normocephalic and atraumatic  Eyes: Pupils are equal, round, and reactive to light  No scleral icterus  Cardiovascular: Normal rate, regular rhythm and normal heart sounds  Pulmonary/Chest: Effort normal and breath sounds normal  No respiratory distress  Abdominal: Soft  Bowel sounds are normal  He exhibits no distension  There is no tenderness  No incisional hernias appreciated   Musculoskeletal: He exhibits no edema  Walks with a cane   Neurological: He is alert and oriented to person, place, and time  Skin: Skin is warm and dry  Psychiatric: He has a normal mood and affect  GOALS:   · Continued/Maintain healthy weight loss with good nutrition intakes  · Adequate hydration with at least 64oz  fluid intake  · Normal vitamin and mineral levels  · Exercise as tolerated  · Avoid dried chicken or steak (try moist cooking techniques and ground turkey and fish)  · Eat slowly and chew thoroughly   · Avoid foods that cause acid reflux  Try weaning off of the pantoprazole in about 3 months  Try over the counter zantac as needed and avoid food triggers       BARRIERS: none identified    · Follow-up in 1 year  We kindly ask that your arrive 15 minutes before your scheduled appointment time with your provider to allow our staff to room you, get your vital signs and update your chart  · Follow diet as discussed  · Get lab work done in the next 2 weeks  You have been given a lab slip today  Please call the office if you need a replacement  It is recommended to check with your insurance BEFORE getting labs done to make sure they are covered by your policy  Also, please check with your PCP and other providers before getting labs to avoid duplicate labs  Make sure to HOLD any multivitamins that may contain biotin and any biotin supplements FOR 5 DAYS before any labs since it can affect the results  · Follow vitamin and mineral recommendations as reviewed with you  · Call our office if you have any problems with abdominal pain especially associated with fever, chills, nausea, vomiting or any other concerns  · All  Post-bariatric surgery patients should be aware that very small quantities of any alcohol can cause impairment and it is very possible not to feel the effect  The effect can be in the system for several hours  It is also a stomach irritant  · It is advised to AVOID alcohol, Nonsteroidal antiinflammatory drugs (NSAIDS) and nicotine of all forms   Any of these can cause stomach irritation/pain

## 2018-06-14 NOTE — ASSESSMENT & PLAN NOTE
· Tolerating a regular diet-yes, struggles with dried chicken or meat  · Eating at least 60 grams of protein per day-yes, still using a protein shake daily for breakfast  · Following 30/60 minute rule with liquids-yes  · Drinking at least 64 ounces of fluid per day-yes  · Drinking carbonated beverages-no  · Sufficient exercise-yes, treadmill for 60 minutes at least 3x/week and walks dog daily  · Using NSAIDs regularly-No   · Using nicotine-no  · Using alcohol-no  · Supplements: Probiotic, Bariatric MVI, chewable calcium

## 2018-06-14 NOTE — PATIENT INSTRUCTIONS
GOALS:   · Continued healthy weight loss with good nutrition intakes  · Adequate hydration with at least 64oz  fluid intake  · Normal vitamin and mineral levels  · Exercise as tolerated  · Avoid dried chicken or steak (try moist cooking techniques and ground turkey and fish)  · Eat slowly and chew thoroughly   · Avoid foods that cause acid reflux  Try weaning off of the pantoprazole in about 3 months  Try over the counter zantac as needed and avoid food triggers  BARRIERS: none identified    · Follow-up in 1 year  We kindly ask that your arrive 15 minutes before your scheduled appointment time with your provider to allow our staff to room you, get your vital signs and update your chart  · Follow diet as discussed  · Get lab work done in the next 2 weeks  You have been given a lab slip today  Please call the office if you need a replacement  It is recommended to check with your insurance BEFORE getting labs done to make sure they are covered by your policy  Also, please check with your PCP and other providers before getting labs to avoid duplicate labs  Make sure to HOLD any multivitamins that may contain biotin and any biotin supplements FOR 5 DAYS before any labs since it can affect the results  · Follow vitamin and mineral recommendations as reviewed with you  · Call our office if you have any problems with abdominal pain especially associated with fever, chills, nausea, vomiting or any other concerns  · All  Post-bariatric surgery patients should be aware that very small quantities of any alcohol can cause impairment and it is very possible not to feel the effect  The effect can be in the system for several hours  It is also a stomach irritant  · It is advised to AVOID alcohol, Nonsteroidal antiinflammatory drugs (NSAIDS) and nicotine of all forms   Any of these can cause stomach irritation/pain

## 2018-06-14 NOTE — ASSESSMENT & PLAN NOTE
-Reports reflux most days, especially worse with food triggers (red meat, spicy foods, eating late at night)  -PCP started him on Pantoprazole 40mg daily  -Discussed avoidance of food triggers, avoid eating late at night  -Try weaning PPI in about 3 months and use OTC H2 blocker prn

## 2018-06-14 NOTE — ASSESSMENT & PLAN NOTE
-At risk for malabsorption of vitamins/minerals secondary to malabsorption and restriction of intake from bariatric surgery  -Currently taking adequate postop bariatric surgery vitamin supplementation-yes  -Last set of limited bariatric labs completed on 03/2018 with noted elevated folate and B12 - safe and ok   Vitamin panel done in 10/2017 showed insufficient vitamin D  -Next set of complete bariatric labs ordered for approximately 2 weeks  -Patient received education about the importance of adhering to a lifelong supplementation regimen to avoid vitamin/mineral deficiencies

## 2018-06-14 NOTE — PROGRESS NOTES
Will re-order H Pylori stool antigen test to confirm eradication of H  Pylori, given patient's hx  Of H Pylori and symptoms of acid reflux

## 2018-06-14 NOTE — TELEPHONE ENCOUNTER
Spoke to the patient regarding h  Pylori stool antigen testing  Advised to stop PPI for 2 weeks before the test  Patient verbalizes understanding  Will mail order today      Enrike Douglas PA-C  6/14/2018  12:48 PM

## 2018-06-28 ENCOUNTER — TELEPHONE (OUTPATIENT)
Dept: BARIATRICS | Facility: CLINIC | Age: 53
End: 2018-06-28

## 2018-06-28 ENCOUNTER — APPOINTMENT (OUTPATIENT)
Dept: LAB | Facility: CLINIC | Age: 53
End: 2018-06-28
Payer: COMMERCIAL

## 2018-06-28 DIAGNOSIS — Z98.84 BARIATRIC SURGERY STATUS: ICD-10-CM

## 2018-06-28 DIAGNOSIS — E55.9 VITAMIN D DEFICIENCY: ICD-10-CM

## 2018-06-28 DIAGNOSIS — K91.2 POSTSURGICAL MALABSORPTION: ICD-10-CM

## 2018-06-28 LAB
25(OH)D3 SERPL-MCNC: 26.1 NG/ML (ref 30–100)
BASOPHILS # BLD AUTO: 0.02 THOUSANDS/ΜL (ref 0–0.1)
BASOPHILS NFR BLD AUTO: 1 % (ref 0–1)
EOSINOPHIL # BLD AUTO: 0.08 THOUSAND/ΜL (ref 0–0.61)
EOSINOPHIL NFR BLD AUTO: 3 % (ref 0–6)
ERYTHROCYTE [DISTWIDTH] IN BLOOD BY AUTOMATED COUNT: 13.5 % (ref 11.6–15.1)
EST. AVERAGE GLUCOSE BLD GHB EST-MCNC: 117 MG/DL
FERRITIN SERPL-MCNC: 115 NG/ML (ref 8–388)
FOLATE SERPL-MCNC: >20 NG/ML (ref 3.1–17.5)
HBA1C MFR BLD: 5.7 % (ref 4.2–6.3)
HCT VFR BLD AUTO: 39.4 % (ref 36.5–49.3)
HGB BLD-MCNC: 12.7 G/DL (ref 12–17)
IMM GRANULOCYTES # BLD AUTO: 0.01 THOUSAND/UL (ref 0–0.2)
IMM GRANULOCYTES NFR BLD AUTO: 0 % (ref 0–2)
IRON SATN MFR SERPL: 21 %
IRON SERPL-MCNC: 82 UG/DL (ref 65–175)
LYMPHOCYTES # BLD AUTO: 0.9 THOUSANDS/ΜL (ref 0.6–4.47)
LYMPHOCYTES NFR BLD AUTO: 31 % (ref 14–44)
MCH RBC QN AUTO: 29.2 PG (ref 26.8–34.3)
MCHC RBC AUTO-ENTMCNC: 32.2 G/DL (ref 31.4–37.4)
MCV RBC AUTO: 91 FL (ref 82–98)
MONOCYTES # BLD AUTO: 0.23 THOUSAND/ΜL (ref 0.17–1.22)
MONOCYTES NFR BLD AUTO: 8 % (ref 4–12)
NEUTROPHILS # BLD AUTO: 1.67 THOUSANDS/ΜL (ref 1.85–7.62)
NEUTS SEG NFR BLD AUTO: 57 % (ref 43–75)
NRBC BLD AUTO-RTO: 0 /100 WBCS
PLATELET # BLD AUTO: 154 THOUSANDS/UL (ref 149–390)
PMV BLD AUTO: 10.9 FL (ref 8.9–12.7)
PTH-INTACT SERPL-MCNC: 41.8 PG/ML (ref 18.4–80.1)
RBC # BLD AUTO: 4.35 MILLION/UL (ref 3.88–5.62)
TIBC SERPL-MCNC: 400 UG/DL (ref 250–450)
VIT B12 SERPL-MCNC: 831 PG/ML (ref 100–900)
WBC # BLD AUTO: 2.91 THOUSAND/UL (ref 4.31–10.16)

## 2018-06-28 PROCEDURE — 83036 HEMOGLOBIN GLYCOSYLATED A1C: CPT

## 2018-06-28 PROCEDURE — 36415 COLL VENOUS BLD VENIPUNCTURE: CPT

## 2018-06-28 PROCEDURE — 82746 ASSAY OF FOLIC ACID SERUM: CPT

## 2018-06-28 PROCEDURE — 82525 ASSAY OF COPPER: CPT

## 2018-06-28 PROCEDURE — 83540 ASSAY OF IRON: CPT

## 2018-06-28 PROCEDURE — 82728 ASSAY OF FERRITIN: CPT

## 2018-06-28 PROCEDURE — 84425 ASSAY OF VITAMIN B-1: CPT

## 2018-06-28 PROCEDURE — 82607 VITAMIN B-12: CPT

## 2018-06-28 PROCEDURE — 83970 ASSAY OF PARATHORMONE: CPT

## 2018-06-28 PROCEDURE — 84590 ASSAY OF VITAMIN A: CPT

## 2018-06-28 PROCEDURE — 83550 IRON BINDING TEST: CPT

## 2018-06-28 PROCEDURE — 85025 COMPLETE CBC W/AUTO DIFF WBC: CPT

## 2018-06-28 PROCEDURE — 84630 ASSAY OF ZINC: CPT

## 2018-06-28 PROCEDURE — 82306 VITAMIN D 25 HYDROXY: CPT

## 2018-06-28 NOTE — TELEPHONE ENCOUNTER
Spoke to the patient regarding most recent labs  Noted Vitamin D insufficiency - was 28 eight months ago and now 32  Patient reports taking 2,000IU most days on empty stomach  Advised patient to increase to 4-5,000IU daily with food  Will contact when full vitamin panel results are available  He has obtained his stool testing kit and will f/u when results of H  Pylori eradication test complete

## 2018-06-29 ENCOUNTER — APPOINTMENT (OUTPATIENT)
Dept: LAB | Facility: CLINIC | Age: 53
End: 2018-06-29
Payer: COMMERCIAL

## 2018-06-29 DIAGNOSIS — A04.8 OTHER SPECIFIED BACTERIAL INTESTINAL INFECTIONS: ICD-10-CM

## 2018-06-29 DIAGNOSIS — K21.9 GASTROESOPHAGEAL REFLUX DISEASE, ESOPHAGITIS PRESENCE NOT SPECIFIED: ICD-10-CM

## 2018-06-29 PROCEDURE — 87338 HPYLORI STOOL AG IA: CPT

## 2018-06-30 LAB
COPPER SERPL-MCNC: 76 UG/DL (ref 72–166)
H PYLORI AG STL QL IA: POSITIVE
ZINC SERPL-MCNC: 83 UG/DL (ref 56–134)

## 2018-07-01 LAB
VIT A SERPL-MCNC: 43.5 UG/DL (ref 33.1–100)
VIT B1 BLD-SCNC: 105.4 NMOL/L (ref 66.5–200)

## 2018-07-02 ENCOUNTER — TELEPHONE (OUTPATIENT)
Dept: BARIATRICS | Facility: CLINIC | Age: 53
End: 2018-07-02

## 2018-07-05 ENCOUNTER — TELEPHONE (OUTPATIENT)
Dept: BARIATRICS | Facility: CLINIC | Age: 53
End: 2018-07-05

## 2018-07-09 NOTE — PROGRESS NOTES
Has been treated for H pylori after results received   RX has been called in to pharmacy by Nancy Rodriguez PA-C

## 2018-07-10 NOTE — TELEPHONE ENCOUNTER
Mailed letter again to ordering doctor's office along with H&P form that needs to be completed prior to patient coming in for sleep study scheduled on 9/13/18

## 2018-07-11 NOTE — TELEPHONE ENCOUNTER
Referral obtained via navinet  Referral could not be backdated due to using online system  Referral dated 7/5/2018  Practice Admin notified and aware

## 2018-07-24 ENCOUNTER — TELEPHONE (OUTPATIENT)
Dept: BARIATRICS | Facility: CLINIC | Age: 53
End: 2018-07-24

## 2018-08-08 ENCOUNTER — TRANSCRIBE ORDERS (OUTPATIENT)
Dept: SLEEP CENTER | Facility: CLINIC | Age: 53
End: 2018-08-08

## 2019-01-02 ENCOUNTER — TRANSCRIBE ORDERS (OUTPATIENT)
Dept: ADMINISTRATIVE | Facility: HOSPITAL | Age: 54
End: 2019-01-02

## 2019-01-02 DIAGNOSIS — E04.9 GOITER: Primary | ICD-10-CM

## 2019-01-03 ENCOUNTER — HOSPITAL ENCOUNTER (OUTPATIENT)
Dept: ULTRASOUND IMAGING | Facility: CLINIC | Age: 54
Discharge: HOME | End: 2019-01-03
Payer: COMMERCIAL

## 2019-01-03 DIAGNOSIS — E04.9 GOITER: ICD-10-CM

## 2019-01-03 PROCEDURE — 76536 US EXAM OF HEAD AND NECK: CPT

## 2019-01-10 DIAGNOSIS — R39.9 LOWER URINARY TRACT SYMPTOMS: ICD-10-CM

## 2019-01-10 RX ORDER — TAMSULOSIN HYDROCHLORIDE 0.4 MG/1
0.4 CAPSULE ORAL 2 TIMES DAILY
Qty: 60 CAPSULE | Refills: 0 | Status: SHIPPED | OUTPATIENT
Start: 2019-01-10 | End: 2019-10-10 | Stop reason: DRUGHIGH

## 2019-01-10 NOTE — TELEPHONE ENCOUNTER
Floyd Huerta patient, managed by Dr Kindra Medina, history of CAP/ LUTS  Last seen on 5/29/18,  Follow up scheduled for 1/25/19  Per note on Tamsulosin BID

## 2019-01-11 ENCOUNTER — TRANSCRIBE ORDERS (OUTPATIENT)
Dept: ADMINISTRATIVE | Facility: HOSPITAL | Age: 54
End: 2019-01-11

## 2019-01-11 ENCOUNTER — APPOINTMENT (OUTPATIENT)
Dept: LAB | Facility: HOSPITAL | Age: 54
End: 2019-01-11
Payer: COMMERCIAL

## 2019-01-11 DIAGNOSIS — C61 PROSTATE CANCER (HCC): ICD-10-CM

## 2019-01-11 DIAGNOSIS — E04.2 MULTINODULAR THYROID: Primary | ICD-10-CM

## 2019-01-11 DIAGNOSIS — E04.2 MULTINODULAR THYROID: ICD-10-CM

## 2019-01-11 LAB
PSA SERPL-MCNC: 0.5 NG/ML (ref 0–4)
TSH SERPL DL<=0.05 MIU/L-ACNC: 0.47 UIU/ML (ref 0.36–3.74)

## 2019-01-11 PROCEDURE — 36415 COLL VENOUS BLD VENIPUNCTURE: CPT

## 2019-01-11 PROCEDURE — 84153 ASSAY OF PSA TOTAL: CPT

## 2019-01-11 PROCEDURE — 84443 ASSAY THYROID STIM HORMONE: CPT

## 2019-01-17 ENCOUNTER — TELEPHONE (OUTPATIENT)
Dept: UROLOGY | Facility: AMBULATORY SURGERY CENTER | Age: 54
End: 2019-01-17

## 2019-01-17 NOTE — TELEPHONE ENCOUNTER
Patient needs a referral for his appt with Arnoldo Joshua on 1/25/19 at the [facility] location

## 2019-01-17 NOTE — TELEPHONE ENCOUNTER
Spoke with Mirian Walsh at [facility]  She will enter the referral through [address] [place] [address]

## 2019-01-31 ENCOUNTER — HOSPITAL ENCOUNTER (OUTPATIENT)
Dept: ULTRASOUND IMAGING | Facility: HOSPITAL | Age: 54
Discharge: HOME | End: 2019-01-31
Payer: COMMERCIAL

## 2019-01-31 DIAGNOSIS — E04.2 NONTOXIC MULTINODULAR GOITER: ICD-10-CM

## 2019-01-31 PROCEDURE — 10005 FNA BX W/US GDN 1ST LES: CPT

## 2019-01-31 PROCEDURE — 88173 CYTOPATH EVAL FNA REPORT: CPT | Performed by: PATHOLOGY

## 2019-01-31 PROCEDURE — 88172 CYTP DX EVAL FNA 1ST EA SITE: CPT | Performed by: PATHOLOGY

## 2019-01-31 PROCEDURE — 10006 FNA BX W/US GDN EA ADDL: CPT

## 2019-01-31 RX ORDER — LIDOCAINE HYDROCHLORIDE 10 MG/ML
4 INJECTION, SOLUTION INFILTRATION; PERINEURAL ONCE
Status: DISCONTINUED | OUTPATIENT
Start: 2019-01-31 | End: 2019-02-04 | Stop reason: HOSPADM

## 2019-04-04 ENCOUNTER — OFFICE VISIT (OUTPATIENT)
Dept: BARIATRICS | Facility: CLINIC | Age: 54
End: 2019-04-04
Payer: COMMERCIAL

## 2019-04-04 VITALS
RESPIRATION RATE: 18 BRPM | SYSTOLIC BLOOD PRESSURE: 118 MMHG | TEMPERATURE: 98.9 F | DIASTOLIC BLOOD PRESSURE: 80 MMHG | WEIGHT: 264.8 LBS | BODY MASS INDEX: 37.91 KG/M2 | HEART RATE: 66 BPM | HEIGHT: 70 IN

## 2019-04-04 DIAGNOSIS — R63.5 WEIGHT GAIN, ABNORMAL: ICD-10-CM

## 2019-04-04 DIAGNOSIS — I10 HYPERTENSION, UNSPECIFIED TYPE: ICD-10-CM

## 2019-04-04 DIAGNOSIS — Z98.84 S/P LAPAROSCOPIC SLEEVE GASTRECTOMY: Primary | ICD-10-CM

## 2019-04-04 DIAGNOSIS — K91.2 POSTSURGICAL MALABSORPTION: ICD-10-CM

## 2019-04-04 DIAGNOSIS — R12 HEARTBURN: ICD-10-CM

## 2019-04-04 PROCEDURE — 99214 OFFICE O/P EST MOD 30 MIN: CPT | Performed by: PHYSICIAN ASSISTANT

## 2019-04-04 RX ORDER — LISINOPRIL 2.5 MG/1
TABLET ORAL
COMMUNITY
Start: 2019-02-18 | End: 2019-10-11 | Stop reason: ALTCHOICE

## 2019-04-08 ENCOUNTER — APPOINTMENT (OUTPATIENT)
Dept: LAB | Facility: CLINIC | Age: 54
End: 2019-04-08
Payer: COMMERCIAL

## 2019-04-08 DIAGNOSIS — Z98.84 S/P LAPAROSCOPIC SLEEVE GASTRECTOMY: ICD-10-CM

## 2019-04-08 DIAGNOSIS — K91.2 POSTSURGICAL MALABSORPTION: ICD-10-CM

## 2019-04-08 LAB
25(OH)D3 SERPL-MCNC: 30.8 NG/ML (ref 30–100)
ALBUMIN SERPL BCP-MCNC: 4 G/DL (ref 3.5–5)
ALP SERPL-CCNC: 36 U/L (ref 46–116)
ALT SERPL W P-5'-P-CCNC: 27 U/L (ref 12–78)
ANION GAP SERPL CALCULATED.3IONS-SCNC: 2 MMOL/L (ref 4–13)
AST SERPL W P-5'-P-CCNC: 16 U/L (ref 5–45)
BASOPHILS # BLD AUTO: 0.02 THOUSANDS/ΜL (ref 0–0.1)
BASOPHILS NFR BLD AUTO: 1 % (ref 0–1)
BILIRUB SERPL-MCNC: 0.55 MG/DL (ref 0.2–1)
BUN SERPL-MCNC: 14 MG/DL (ref 5–25)
CALCIUM SERPL-MCNC: 8.4 MG/DL (ref 8.3–10.1)
CHLORIDE SERPL-SCNC: 111 MMOL/L (ref 100–108)
CO2 SERPL-SCNC: 31 MMOL/L (ref 21–32)
CREAT SERPL-MCNC: 1.02 MG/DL (ref 0.6–1.3)
EOSINOPHIL # BLD AUTO: 0.11 THOUSAND/ΜL (ref 0–0.61)
EOSINOPHIL NFR BLD AUTO: 4 % (ref 0–6)
ERYTHROCYTE [DISTWIDTH] IN BLOOD BY AUTOMATED COUNT: 12.8 % (ref 11.6–15.1)
FERRITIN SERPL-MCNC: 117 NG/ML (ref 8–388)
FOLATE SERPL-MCNC: >20 NG/ML (ref 3.1–17.5)
GFR SERPL CREATININE-BSD FRML MDRD: 97 ML/MIN/1.73SQ M
GLUCOSE P FAST SERPL-MCNC: 86 MG/DL (ref 65–99)
HCT VFR BLD AUTO: 40.2 % (ref 36.5–49.3)
HGB BLD-MCNC: 13.1 G/DL (ref 12–17)
IMM GRANULOCYTES # BLD AUTO: 0.01 THOUSAND/UL (ref 0–0.2)
IMM GRANULOCYTES NFR BLD AUTO: 0 % (ref 0–2)
IRON SATN MFR SERPL: 20 %
IRON SERPL-MCNC: 83 UG/DL (ref 65–175)
LYMPHOCYTES # BLD AUTO: 0.96 THOUSANDS/ΜL (ref 0.6–4.47)
LYMPHOCYTES NFR BLD AUTO: 34 % (ref 14–44)
MCH RBC QN AUTO: 29.6 PG (ref 26.8–34.3)
MCHC RBC AUTO-ENTMCNC: 32.6 G/DL (ref 31.4–37.4)
MCV RBC AUTO: 91 FL (ref 82–98)
MONOCYTES # BLD AUTO: 0.23 THOUSAND/ΜL (ref 0.17–1.22)
MONOCYTES NFR BLD AUTO: 8 % (ref 4–12)
NEUTROPHILS # BLD AUTO: 1.5 THOUSANDS/ΜL (ref 1.85–7.62)
NEUTS SEG NFR BLD AUTO: 53 % (ref 43–75)
NRBC BLD AUTO-RTO: 0 /100 WBCS
PLATELET # BLD AUTO: 140 THOUSANDS/UL (ref 149–390)
PMV BLD AUTO: 10.7 FL (ref 8.9–12.7)
POTASSIUM SERPL-SCNC: 3.9 MMOL/L (ref 3.5–5.3)
PROT SERPL-MCNC: 7.4 G/DL (ref 6.4–8.2)
PTH-INTACT SERPL-MCNC: 34.2 PG/ML (ref 18.4–80.1)
RBC # BLD AUTO: 4.43 MILLION/UL (ref 3.88–5.62)
SODIUM SERPL-SCNC: 144 MMOL/L (ref 136–145)
TIBC SERPL-MCNC: 423 UG/DL (ref 250–450)
VIT B12 SERPL-MCNC: 867 PG/ML (ref 100–900)
WBC # BLD AUTO: 2.83 THOUSAND/UL (ref 4.31–10.16)

## 2019-04-08 PROCEDURE — 83550 IRON BINDING TEST: CPT

## 2019-04-08 PROCEDURE — 84425 ASSAY OF VITAMIN B-1: CPT

## 2019-04-08 PROCEDURE — 84590 ASSAY OF VITAMIN A: CPT

## 2019-04-08 PROCEDURE — 83540 ASSAY OF IRON: CPT

## 2019-04-08 PROCEDURE — 82306 VITAMIN D 25 HYDROXY: CPT

## 2019-04-08 PROCEDURE — 84630 ASSAY OF ZINC: CPT

## 2019-04-08 PROCEDURE — 82607 VITAMIN B-12: CPT

## 2019-04-08 PROCEDURE — 83970 ASSAY OF PARATHORMONE: CPT

## 2019-04-08 PROCEDURE — 82525 ASSAY OF COPPER: CPT

## 2019-04-08 PROCEDURE — 36415 COLL VENOUS BLD VENIPUNCTURE: CPT

## 2019-04-08 PROCEDURE — 85025 COMPLETE CBC W/AUTO DIFF WBC: CPT

## 2019-04-08 PROCEDURE — 80053 COMPREHEN METABOLIC PANEL: CPT

## 2019-04-08 PROCEDURE — 82746 ASSAY OF FOLIC ACID SERUM: CPT

## 2019-04-08 PROCEDURE — 82728 ASSAY OF FERRITIN: CPT

## 2019-04-10 LAB
COPPER SERPL-MCNC: 70 UG/DL (ref 72–166)
ZINC SERPL-MCNC: 85 UG/DL (ref 56–134)

## 2019-04-11 LAB — VIT A SERPL-MCNC: 52.9 UG/DL (ref 20.1–62)

## 2019-04-12 LAB — VIT B1 BLD-SCNC: 135.3 NMOL/L (ref 66.5–200)

## 2019-05-03 ENCOUNTER — OFFICE VISIT (OUTPATIENT)
Dept: BARIATRICS | Facility: CLINIC | Age: 54
End: 2019-05-03

## 2019-05-03 VITALS — WEIGHT: 263.4 LBS | HEIGHT: 70 IN | BODY MASS INDEX: 37.71 KG/M2

## 2019-05-03 VITALS — HEIGHT: 70 IN | BODY MASS INDEX: 37.71 KG/M2 | WEIGHT: 263.4 LBS

## 2019-05-03 DIAGNOSIS — Z98.84 BARIATRIC SURGERY STATUS: Primary | ICD-10-CM

## 2019-05-03 DIAGNOSIS — Z98.84 S/P LAPAROSCOPIC SLEEVE GASTRECTOMY: Primary | ICD-10-CM

## 2019-05-03 PROCEDURE — RECHECK

## 2019-05-22 ENCOUNTER — TELEPHONE (OUTPATIENT)
Dept: BARIATRICS | Facility: CLINIC | Age: 54
End: 2019-05-22

## 2019-09-25 RX ORDER — LISINOPRIL 10 MG/1
TABLET ORAL
COMMUNITY
Start: 2017-08-05 | End: 2020-07-09 | Stop reason: SDUPTHER

## 2019-09-25 RX ORDER — HYDROCHLOROTHIAZIDE 25 MG/1
TABLET ORAL
COMMUNITY
End: 2020-12-09 | Stop reason: HOSPADM

## 2019-09-25 RX ORDER — BUDESONIDE AND FORMOTEROL FUMARATE DIHYDRATE 160; 4.5 UG/1; UG/1
AEROSOL RESPIRATORY (INHALATION)
COMMUNITY
End: 2019-10-11 | Stop reason: ALTCHOICE

## 2019-09-25 RX ORDER — ESOMEPRAZOLE MAGNESIUM 40 MG/1
FOR SUSPENSION ORAL
COMMUNITY
End: 2020-12-03 | Stop reason: ALTCHOICE

## 2019-09-25 RX ORDER — LEVOFLOXACIN 750 MG/1
TABLET ORAL
COMMUNITY
End: 2020-12-03 | Stop reason: ALTCHOICE

## 2019-09-25 RX ORDER — ALPRAZOLAM 0.25 MG/1
TABLET ORAL
COMMUNITY
End: 2019-10-11 | Stop reason: ALTCHOICE

## 2019-09-25 RX ORDER — LANSOPRAZOLE 30 MG/1
CAPSULE, DELAYED RELEASE ORAL
COMMUNITY
End: 2019-10-11 | Stop reason: ALTCHOICE

## 2019-09-25 RX ORDER — PREDNISONE 20 MG/1
TABLET ORAL
COMMUNITY
End: 2019-10-11 | Stop reason: ALTCHOICE

## 2019-09-25 RX ORDER — PREDNISONE 10 MG/1
TABLET ORAL
COMMUNITY
End: 2019-10-11 | Stop reason: ALTCHOICE

## 2019-10-08 ENCOUNTER — OFFICE VISIT (OUTPATIENT)
Dept: BARIATRICS | Facility: CLINIC | Age: 54
End: 2019-10-08
Payer: COMMERCIAL

## 2019-10-08 ENCOUNTER — TELEPHONE (OUTPATIENT)
Dept: BARIATRICS | Facility: CLINIC | Age: 54
End: 2019-10-08

## 2019-10-08 ENCOUNTER — PREP FOR PROCEDURE (OUTPATIENT)
Dept: BARIATRICS | Facility: CLINIC | Age: 54
End: 2019-10-08

## 2019-10-08 VITALS
TEMPERATURE: 98.7 F | DIASTOLIC BLOOD PRESSURE: 82 MMHG | BODY MASS INDEX: 38.37 KG/M2 | WEIGHT: 268 LBS | HEART RATE: 68 BPM | HEIGHT: 70 IN | SYSTOLIC BLOOD PRESSURE: 118 MMHG

## 2019-10-08 DIAGNOSIS — R63.5 WEIGHT GAIN, ABNORMAL: ICD-10-CM

## 2019-10-08 DIAGNOSIS — R12 HEARTBURN: ICD-10-CM

## 2019-10-08 DIAGNOSIS — R13.10 DYSPHAGIA: ICD-10-CM

## 2019-10-08 DIAGNOSIS — K91.2 POSTSURGICAL MALABSORPTION: ICD-10-CM

## 2019-10-08 DIAGNOSIS — K91.2 POSTSURGICAL MALABSORPTION: Primary | ICD-10-CM

## 2019-10-08 DIAGNOSIS — E66.01 MORBID OBESITY (HCC): ICD-10-CM

## 2019-10-08 DIAGNOSIS — R10.13 EPIGASTRIC PAIN: ICD-10-CM

## 2019-10-08 DIAGNOSIS — A04.8 H. PYLORI INFECTION: ICD-10-CM

## 2019-10-08 DIAGNOSIS — Z48.815 ENCOUNTER FOR SURGICAL AFTERCARE FOLLOWING SURGERY OF DIGESTIVE SYSTEM: Primary | ICD-10-CM

## 2019-10-08 PROCEDURE — 99214 OFFICE O/P EST MOD 30 MIN: CPT | Performed by: PHYSICIAN ASSISTANT

## 2019-10-08 RX ORDER — PANTOPRAZOLE SODIUM 40 MG/1
40 TABLET, DELAYED RELEASE ORAL 2 TIMES DAILY
Qty: 60 TABLET | Refills: 2 | Status: SHIPPED | OUTPATIENT
Start: 2019-10-08 | End: 2020-05-30 | Stop reason: SDUPTHER

## 2019-10-08 RX ORDER — CLARITHROMYCIN 500 MG/1
500 TABLET, COATED ORAL 2 TIMES DAILY
Refills: 0 | COMMUNITY
Start: 2019-08-08 | End: 2019-10-11 | Stop reason: ALTCHOICE

## 2019-10-08 RX ORDER — SUCRALFATE 1 G/1
1 TABLET ORAL 4 TIMES DAILY
Qty: 120 TABLET | Refills: 2 | Status: SHIPPED | OUTPATIENT
Start: 2019-10-08 | End: 2020-05-28 | Stop reason: ALTCHOICE

## 2019-10-08 RX ORDER — PANTOPRAZOLE SODIUM 20 MG/1
20 TABLET, DELAYED RELEASE ORAL DAILY
Qty: 60 TABLET | Refills: 2 | Status: SHIPPED | OUTPATIENT
Start: 2019-10-08 | End: 2019-10-08 | Stop reason: CLARIF

## 2019-10-08 NOTE — PROGRESS NOTES
There are no diagnoses linked to this encounter  Assessment and plan:       1  [place] 7 prostate cancer status post external beam radiation (1/9/17) managed by Dr Milly Dubois  -  Patient has not had a PSA since January  At that time it was 0 5   -  Patient will have PSA drawn prior to his next follow-up      2  lower urinary tract symptoms  - Patient had previously noticed an improvement with LUTS on tamsulosin BID  This has been since decreased to once daily   - He was provided with a renewed prescription for twice daily dosing  3  Dysuria   - Patient today reports a 10 day history of dysuria  Urine is positive for blood and leukocyte esterase  - Urine will be sent for microscopy and culture  - He was provided with a prescription of ciprofloxacin  He will return approximately 1 month       Xavier Sanders PA-C      Chief Complaint     Chief Complaint   Patient presents with    Prostate Cancer         History of Present Illness     Simeon Warner is a 49 y o  male patient of Dr Krystina Joyce with a history of [place] 7 low volume prostate cancer status post external beam radiation therapy (01/09/2017) presenting for six-month follow-up  Patient was previously under the care of a urologist at [facility]  Patient does have a positive family history of prostate cancer in his father who was diagnosed in his late 76s  Patient's PSA was 4 9 in 2016 which prompted his prostate biopsy  Pathology was reviewed at [facility] confirming 2 foci of Azam 7 disease  Patient elected to proceed with robotic assisted laparoscopic prostatectomy in August of 2016  Unfortunately, surgery had to be aborted secondary to difficulty with ventilation while in the Trendelenburg position  He subsequently completed external beam radiation therapy on 01/09/2017  Patient does have lower urinary tract symptoms which he takes tamsulosin for and is able to achieve moderate control    At 1 point he was using tamsulosin twice daily  This has been decreased by his primary care provider to once daily  He has noticed some symptom worsening  Patient has had burning with urination as well as suprapubic pain and pressure over the last 1-2 weeks  He has been needing to perform double voiding  Urine specimen is positive for RBCs and leukocyte esterase  Laboratory     Lab Results   Component Value Date    CREATININE 1 02 04/08/2019       Lab Results   Component Value Date    PSA 0 5 01/11/2019    PSA 1 0 05/03/2018    PSA 0 6 10/04/2017       Recent Results (from the past 1 hour(s))   POCT urine dip    Collection Time: 10/10/19 10:52 AM   Result Value Ref Range    LEUKOCYTE ESTERASE,UA trace     NITRITE,UA neg     SL AMB POCT UROBILINOGEN 0 2     POCT URINE PROTEIN 30+      PH,UA 5 0     BLOOD,UA trace     SPECIFIC GRAVITY,UA 1 015     KETONES,UA trace     BILIRUBIN,UA neg     GLUCOSE, UA neg      COLOR,UA yellow     CLARITY,UA clear          Review of Systems     Review of Systems   Constitutional: Negative for chills, fever and unexpected weight change  HENT:        Just getting over a cold   Respiratory: Negative for shortness of breath  Cardiovascular: Negative for chest pain  Gastrointestinal: Positive for constipation and nausea (intermittent)  Negative for diarrhea and vomiting  Genitourinary: Positive for difficulty urinating, dysuria, flank pain, frequency (at night) and urgency  Musculoskeletal: Positive for arthralgias (knee pain) and back pain  Neurological: Positive for dizziness (occasionally) and light-headedness (occasionally)  Allergies     Allergies   Allergen Reactions    Other     Pollen Extract        Physical Exam     Physical Exam   Constitutional: He is oriented to person, place, and time  He appears well-developed and well-nourished  No distress  HENT:   Head: Normocephalic and atraumatic     Right Ear: External ear normal    Left Ear: External ear normal    Nose: Nose normal    Eyes: Right eye exhibits no discharge  Left eye exhibits no discharge  No scleral icterus  Cardiovascular: Normal rate  Pulmonary/Chest: Effort normal    Musculoskeletal:   Ambulates with a cane for assistance  Neurological: He is alert and oriented to person, place, and time  Skin: Skin is warm and dry  He is not diaphoretic  Psychiatric: He has a normal mood and affect  His behavior is normal  Judgment and thought content normal    Nursing note and vitals reviewed  Vital Signs     Vitals:    10/10/19 1047   BP: 122/84   BP Location: Left arm   Patient Position: Sitting   Cuff Size: Adult   Pulse: 66   Weight: 123 kg (272 lb)   Height: 5' 10" (1 778 m)         Current Medications       Current Outpatient Medications:     albuterol (PROAIR HFA) 90 mcg/act inhaler, Inhale, Disp: , Rfl:     ALPRAZolam (XANAX) 0 25 mg tablet, Take by mouth, Disp: , Rfl:     Ascorbic Acid (VITAMIN C PO), Take 1 capsule by mouth 2 (two) times a day, Disp: , Rfl:     b complex vitamins tablet, Take 1 tablet by mouth daily, Disp: , Rfl:     BREO ELLIPTA 200-25 MCG/INH inhaler, Inhale 1 puff daily, Disp: , Rfl: 1    CALCIUM CARBONATE PO, Take 1 tablet by mouth 2 (two) times a day, Disp: , Rfl:     CALCIUM CITRATE PO, Take by mouth, Disp: , Rfl:     docusate sodium (COLACE) 100 mg capsule, Take 100 mg by mouth every other day, Disp: , Rfl:     esomeprazole (NEXIUM) 40 mg packet, Take by mouth, Disp: , Rfl:     fenofibrate (TRICOR) 145 mg tablet, Take 145 mg by mouth daily  , Disp: , Rfl:     flunisolide (NASALIDE) 25 MCG/ACT (0 025%) SOLN, into each nostril, Disp: , Rfl:     fluticasone (FLOVENT HFA) 110 MCG/ACT inhaler, Inhale 2 puffs, Disp: , Rfl:     hydrochlorothiazide (HYDRODIURIL) 25 mg tablet, hydrochlorothiazide 25 mg tablet, Disp: , Rfl:     ipratropium-albuterol (COMBIVENT)  mcg/act inhaler, Inhale 2 puffs every 6 (six) hours as needed for wheezing , Disp: , Rfl:     lisinopril (ZESTRIL) 10 mg tablet, TAKE 1 TABLET DAILY AS DIRECTED , Disp: , Rfl:     loratadine (CLARITIN) 10 mg tablet, daily, Disp: , Rfl:     montelukast (SINGULAIR) 10 mg tablet, Take 10 mg by mouth daily at bedtime  , Disp: , Rfl:     MULTIPLE VITAMIN PO, Take 1 tablet by mouth daily, Disp: , Rfl:     nortriptyline (PAMELOR) 10 mg capsule, Take 1-3 capsules by mouth daily at bedtime, Disp: , Rfl: 5    pantoprazole (PROTONIX) 40 mg tablet, Take 40 mg by mouth daily, Disp: , Rfl: 0    pantoprazole (PROTONIX) 40 mg tablet, Take 1 tablet (40 mg total) by mouth 2 (two) times a day, Disp: 60 tablet, Rfl: 2    Potassium 99 MG TABS, Take 99 mg by mouth daily, Disp: , Rfl:     tamsulosin (FLOMAX) 0 4 mg, Take 1 capsule (0 4 mg total) by mouth 2 (two) times a day, Disp: 60 capsule, Rfl: 0    traMADol (ULTRAM) 50 mg tablet, tramadol 50 mg tablet, Disp: , Rfl:     budesonide-formoterol (SYMBICORT) 160-4 5 mcg/act inhaler, Symbicort 160 mcg-4 5 mcg/actuation HFA aerosol inhaler, Disp: , Rfl:     clarithromycin (BIAXIN) 500 mg tablet, Take 500 mg by mouth 2 (two) times a day, Disp: , Rfl: 0    Cyanocobalamin (NASCOBAL) 500 MCG/0 1ML SOLN, 0 1 mL (500 mcg total) into each nostril once a week Send Tablets for other Vitamins (Patient not taking: Reported on 10/8/2019), Disp: 4 Bottle, Rfl: 12    lansoprazole (PREVACID) 30 mg capsule, lansoprazole 30 mg capsule,delayed release, Disp: , Rfl:     levofloxacin (LEVAQUIN) 750 mg tablet, levofloxacin 750 mg tablet, Disp: , Rfl:     lisinopril (ZESTRIL) 2 5 mg tablet, , Disp: , Rfl:     lisinopril (ZESTRIL) 5 mg tablet, Take 5 mg by mouth daily  , Disp: , Rfl:     metFORMIN (GLUCOPHAGE) 1000 MG tablet, metformin 1,000 mg tablet, Disp: , Rfl:     predniSONE 10 mg tablet, prednisone 10 mg tablet, Disp: , Rfl:     predniSONE 20 mg tablet, prednisone 20 mg tablet, Disp: , Rfl:     Sodium Hyaluronate (EUFLEXXA) 20 MG/2ML SOSY, 2 mL, Disp: , Rfl:     sucralfate (CARAFATE) 1 g tablet, Take 1 tablet (1 g total) by mouth 4 (four) times a day (Patient not taking: Reported on 10/10/2019), Disp: 120 tablet, Rfl: 2    tiotropium (SPIRIVA) 18 mcg inhalation capsule, Place 18 mcg into inhaler and inhale daily  , Disp: , Rfl:       Active Problems     Patient Active Problem List   Diagnosis    [facility] )    Obesity    Headache    Memory difficulty    Postsurgical malabsorption    Encounter for surgical aftercare following surgery of digestive system    Hypertension    Vitamin D insufficiency    Heartburn    Weight gain, abnormal    Dysphagia         Past Medical History     Past Medical History:   Diagnosis Date    Acid reflux     Allergy to cats     Arthritis     Asthma     Back pain     Breathing difficulty     approx July 2016 pt was having prostate surgery and surgery not completed as he developed "severe breathing problems"    Constipation     CPAP (continuous positive airway pressure) dependence     Diabetes mellitus ([facility] )     Enlarged thyroid     Headache     High cholesterol     History of radiation therapy     last treatment 12/2016    Hypertension     Hypertriglyceridemia     Knee pain, bilateral     Memory loss     Muscle weakness     Obesity     Prostate cancer (HCC)     Risk for falls     Seasonal allergies     Shortness of breath     Sleep apnea     cpap    Unsteady gait     "knees buckle"    Use of cane as ambulatory aid     Wears glasses          Surgical History     Past Surgical History:   Procedure Laterality Date    COLONOSCOPY      ESOPHAGOGASTRODUODENOSCOPY      ESOPHAGOGASTRODUODENOSCOPY N/A 6/20/2017    Procedure: ESOPHAGOGASTRODUODENOSCOPY (EGD);   Surgeon: Xiao Campos MD;  Location: [facility];  Service: Bariatrics    NO PAST SURGERIES      TX LAP, ANNETTE RESTRICT PROC, LONGITUDINAL GASTRECTOMY N/A 6/20/2017    Procedure: GASTRECTOMY SLEEVE LAPAROSCOPIC;  Surgeon: Xiao Campos MD;  Location: [facility];  Service: [address] THYROID BIOPSY  1/31/2019         Family History     Family History   Problem Relation Age of Onset    Arthritis Mother     Prostate cancer Father          Social History     Social History       Radiology

## 2019-10-08 NOTE — PROGRESS NOTES
Assessment/Plan:    Encounter for surgical aftercare following surgery of digestive system  -s/p Vertical Sleeve Gastrectomy with Dr Tia Carrasco on 06/20/17  Noted 36lbs weight regain in the last year r/t mindless eating, poor food choices, sugary beverages, and no exercise  He is also c/o dysphagia, mild mid-epigastric irritation, and reflux as noted below  Initial: 316 7lbs  Current: 268lbs  EWL: 34%  Genaro: 232lbs 1 year post op  Current BMI is Body mass index is 38 45 kg/m²  · Tolerating a regular diet-reports dysphagia and reflux as noted below  · Eating at least 60 grams of protein per day-yes, but not tracking  · Following 30/60 minute rule with liquids-no; advised on importance of not drinking with meals  · Drinking at least 64 ounces of fluid per day-no; and still drinking pink lemonade most days; he agrees to stop sugary drinks and increase water  · Drinking carbonated beverages-no  · Sufficient exercise-no; agrees to increase as tolerated  · Using NSAIDs regularly-no  · Using nicotine-no  · Using alcohol-no   Advised about the risks of alcohol s/p bariatric surgery and recommend avoiding all alcohol      Postsurgical malabsorption  -At risk for malabsorption of vitamins/minerals secondary to malabsorption and restriction of intake from bariatric surgery  -Currently taking adequate postop bariatric surgery vitamin supplementation: Nascobal nasal spray B12, OTC MVI x2, Viactive chews x2  -Last set of bariatric labs completed on April 2019 and showed low normal calcium and Vitamin D, low WBC, mildly low copper  -Will repeat CBC, CMP, vitamin D, copper  -Patient received education about the importance of adhering to a lifelong supplementation regimen to avoid vitamin/mineral deficiencies     Dysphagia  -Reports mild dysphagia and mid-epigastric irritation, but mostly reflux daily  -Vomiting if eats too fast  -He admits to eating fast and often drinks with his meals  -Lengthy discussion about eating slowly and chewing thoroughly and avoid drinking with meals  -I believe his symptoms are likely r/t mindless eating and eating fast, but I will order UGI and start PPI BID and carafate for ulcer prophylaxis   -Will schedule EGD as well  -Breath test for h pylori eradication    Heartburn  -Was treated for resistant H Pylori in July  -Has been on Protonix 40mg   -Will repeat breath test when off PPI for 2 weeks  -Treat for potential gastric ulcer with PPI, carafate and will obtain EGD    Weight gain, abnormal  -36lbs weight regain in the last year r/t mindless eating, poor food choices, sugary beverages, and no exercise   -High stress and hectic lifestyle with 4 young boys and 1 with special needs  -Lengthy dietary education today  -Patient agrees to keep food records, practice mindful eating, avoid sugary drinks, increase water intake, and avoid fast food  -He agrees to f/u with RD and SW for additional support  -Advised support groups, PEP rallies, and back to basics classes  -[facility] referral       Diagnoses and all orders for this visit:    Encounter for surgical aftercare following surgery of digestive system  -     Vitamin D 25 hydroxy; Future  -     Copper Level; Future  -     Comprehensive metabolic panel; Future  -     CBC and differential; Future  -     Discontinue: pantoprazole (PROTONIX) 20 mg tablet; Take 1 tablet (20 mg total) by mouth daily  -     FL UPPER GI UGI; Future    Postsurgical malabsorption  -     Vitamin D 25 hydroxy; Future  -     Copper Level; Future  -     Comprehensive metabolic panel; Future  -     CBC and differential; Future    Heartburn  -     Discontinue: pantoprazole (PROTONIX) 20 mg tablet; Take 1 tablet (20 mg total) by mouth daily  -     pantoprazole (PROTONIX) 40 mg tablet; Take 1 tablet (40 mg total) by mouth 2 (two) times a day  -     sucralfate (CARAFATE) 1 g tablet;  Take 1 tablet (1 g total) by mouth 4 (four) times a day    Epigastric pain  -     pantoprazole (PROTONIX) 40 mg tablet; Take 1 tablet (40 mg total) by mouth 2 (two) times a day  -     sucralfate (CARAFATE) 1 g tablet; Take 1 tablet (1 g total) by mouth 4 (four) times a day  -     FL UPPER GI UGI; Future    Dysphagia  -     FL UPPER GI UGI; Future    Weight gain, abnormal    H  pylori infection  -     H  pylori breath test; Future    Other orders  -     clarithromycin (BIAXIN) 500 mg tablet; Take 500 mg by mouth 2 (two) times a day          Subjective:      Patient ID: Kevin Agudelo is a 49 y o  male  -s/p Vertical Sleeve Gastrectomy with Dr Montelongo Angry on 06/20/17  Presents to the office today for follow up for weight regain and c/o feelings of dysphagia and mid-epigastric tenderness and irritation as well as daily reflux  He denies NSAIDS, ETOH, or smoking  Hx  Of H pylori and salvage treatment last year  Still eating mostly fast food, eats quickly, drinks with meals, drinks pink lemonade, no exercise  Diet Recall:  B - skips   L - shake or eats fast food ([employer] - meat and cheese tacos or burritos)  D - Fast food   Snacks - grazes on cheese or almonds or deli meats    Fluids - water 64oz, pink lemonade, gatorade zero    The following portions of the patient's history were reviewed and updated as appropriate: allergies, current medications, past family history, past medical history, past social history, past surgical history and problem list     Review of Systems   Constitutional: Negative for chills and fever  Unexpected weight change: planned weight loss  HENT: Positive for trouble swallowing  Respiratory: Negative for cough and shortness of breath  Cardiovascular: Negative for chest pain and palpitations  Gastrointestinal: Positive for abdominal pain and vomiting  Negative for constipation, diarrhea and nausea  Reflux   Musculoskeletal: Positive for arthralgias and gait problem  Neurological: Negative for dizziness           Objective:      /82 (BP Location: Right arm, Patient Position: Sitting, Cuff Size: Large)   Pulse 68   Temp 98 7 °F (37 1 °C) (Tympanic)   Ht 5' 10" (1 778 m)   Wt 122 kg (268 lb)   BMI 38 45 kg/m²     Colonoscopy-due in a few years he believes - he will confirm with PCP       Physical Exam   Constitutional: He is oriented to person, place, and time  He appears well-developed and well-nourished  No distress  HENT:   Head: Normocephalic and atraumatic  Eyes: Pupils are equal, round, and reactive to light  No scleral icterus  Cardiovascular: Normal rate, regular rhythm and normal heart sounds  Pulmonary/Chest: Effort normal and breath sounds normal  No respiratory distress  Abdominal: Soft  Bowel sounds are normal  He exhibits no distension  There is no tenderness  There is no rebound and no guarding  No hernia  No incisional hernias appreciated   Musculoskeletal: He exhibits no edema  Walks with cane   Neurological: He is alert and oriented to person, place, and time  Skin: Skin is warm and dry  Psychiatric: He has a normal mood and affect  Nursing note and vitals reviewed  BARRIERS: none identified    GOALS:   · Adequate hydration with at least 64oz  fluid intake  · Normal vitamin and mineral levels  · Exercise as tolerated  · Keep food records and schedule appointment with RD and SW  · Schedule with [facility]  · Obtain UGI x-ray test  · Schedule EGD  · H  Pylori breath test (must be off PPI for 2 weeks)  · Take Protonix 40mg twice a day at least 2 hours apart from the carafate (mix in 1oz  Of water)    · Follow-up in 6 weeks  We kindly ask that your arrive 15 minutes before your scheduled appointment time with your provider to allow our staff to room you, get your vital signs and update your chart  · Follow diet as discussed  · Follow vitamin and mineral recommendations as reviewed with you      · Call our office if you have any problems with abdominal pain especially associated with fever, chills, nausea, vomiting or any other concerns  · All  Post-bariatric surgery patients should be aware that very small quantities of any alcohol can cause impairment and it is very possible not to feel the effect  The effect can be in the system for several hours  It is also a stomach irritant  · It is advised to AVOID alcohol, Nonsteroidal antiinflammatory drugs (NSAIDS) and nicotine of all forms   Any of these can cause stomach irritation/pain

## 2019-10-08 NOTE — ASSESSMENT & PLAN NOTE
-s/p Vertical Sleeve Gastrectomy with Dr Huggins Or on 06/20/17  Noted 36lbs weight regain in the last year r/t mindless eating, poor food choices, sugary beverages, and no exercise  He is also c/o dysphagia, mild mid-epigastric irritation, and reflux as noted below  Initial: 316 7lbs  Current: 268lbs  EWL: 34%  Genaro: 232lbs 1 year post op  Current BMI is Body mass index is 38 45 kg/m²  · Tolerating a regular diet-reports dysphagia and reflux as noted below  · Eating at least 60 grams of protein per day-yes, but not tracking  · Following 30/60 minute rule with liquids-no; advised on importance of not drinking with meals  · Drinking at least 64 ounces of fluid per day-no; and still drinking pink lemonade most days; he agrees to stop sugary drinks and increase water  · Drinking carbonated beverages-no  · Sufficient exercise-no; agrees to increase as tolerated  · Using NSAIDs regularly-no  · Using nicotine-no  · Using alcohol-no   Advised about the risks of alcohol s/p bariatric surgery and recommend avoiding all alcohol

## 2019-10-08 NOTE — TELEPHONE ENCOUNTER
Pt has a medicare hmo and not a ppo   referral is required  LM on vm at dr office for a referral to be sent  Will also call the patient to advise to follow up with his pcp to make sure the referral is issued

## 2019-10-08 NOTE — TELEPHONE ENCOUNTER
Spoke with patient to advise that a referral is needed and that I already left a message for the pcp's referral line  He will also call them to make sure it is issued

## 2019-10-08 NOTE — ASSESSMENT & PLAN NOTE
-Was treated for resistant H Pylori in July  -Has been on Protonix 40mg   -Will repeat breath test when off PPI for 2 weeks  -Treat for potential gastric ulcer with PPI, carafate and will obtain EGD

## 2019-10-08 NOTE — ASSESSMENT & PLAN NOTE
-At risk for malabsorption of vitamins/minerals secondary to malabsorption and restriction of intake from bariatric surgery  -Currently taking adequate postop bariatric surgery vitamin supplementation: Nascobal nasal spray B12, OTC MVI x2, Viactive chews x2  -Last set of bariatric labs completed on April 2019 and showed low normal calcium and Vitamin D, low WBC, mildly low copper  -Will repeat CBC, CMP, vitamin D, copper  -Patient received education about the importance of adhering to a lifelong supplementation regimen to avoid vitamin/mineral deficiencies

## 2019-10-08 NOTE — ASSESSMENT & PLAN NOTE
-Reports mild dysphagia and mid-epigastric irritation, but mostly reflux daily  -Vomiting if eats too fast  -He admits to eating fast and often drinks with his meals  -Lengthy discussion about eating slowly and chewing thoroughly and avoid drinking with meals  -I believe his symptoms are likely r/t mindless eating and eating fast, but I will order UGI and start PPI BID and carafate for ulcer prophylaxis   -Will schedule EGD as well  -Breath test for h pylori eradication

## 2019-10-08 NOTE — ASSESSMENT & PLAN NOTE
-36lbs weight regain in the last year r/t mindless eating, poor food choices, sugary beverages, and no exercise   -High stress and hectic lifestyle with 4 young boys and 1 with special needs  -Lengthy dietary education today  -Patient agrees to keep food records, practice mindful eating, avoid sugary drinks, increase water intake, and avoid fast food  -He agrees to f/u with RD and SW for additional support  -Advised support groups, PEP rallies, and back to basics classes  -[facility] referral

## 2019-10-08 NOTE — PATIENT INSTRUCTIONS
GOALS:   · Adequate hydration with at least 64oz  fluid intake  · Normal vitamin and mineral levels  · Exercise as tolerated  · Keep food records and schedule appointment with RD and SW  · Schedule with [facility]  · Obtain UGI x-ray test  · Schedule EGD  · Take Protonix 40mg twice a day at least 2 hours apart from the carafate (mix in [facility]  Of water)  · Chew thoroughly and eat slowly  Avoid drinking with meals  · Avoid lemonade and cold fluids  · Avoid fast food if possible    · Follow-up in 6 weeks  We kindly ask that your arrive 15 minutes before your scheduled appointment time with your provider to allow our staff to room you, get your vital signs and update your chart  · Follow diet as discussed  · Get lab work done in the next 2 weeks  You have been given a lab slip today  Please call the office if you need a replacement  It is recommended to check with your insurance BEFORE getting labs done to make sure they are covered by your policy  Also, please check with your PCP and other providers before getting labs to avoid duplicate labs  Make sure to HOLD any multivitamins that may contain biotin and any biotin supplements FOR 5 DAYS before any labs since it can affect the results  · Follow vitamin and mineral recommendations as reviewed with you  · Call our office if you have any problems with abdominal pain especially associated with fever, chills, nausea, vomiting or any other concerns  · All  Post-bariatric surgery patients should be aware that very small quantities of any alcohol can cause impairment and it is very possible not to feel the effect  The effect can be in the system for several hours  It is also a stomach irritant  · It is advised to AVOID alcohol, Nonsteroidal antiinflammatory drugs (NSAIDS) and nicotine of all forms   Any of these can cause stomach irritation/pain

## 2019-10-10 ENCOUNTER — TELEPHONE (OUTPATIENT)
Dept: UROLOGY | Facility: AMBULATORY SURGERY CENTER | Age: 54
End: 2019-10-10

## 2019-10-10 ENCOUNTER — APPOINTMENT (OUTPATIENT)
Dept: LAB | Facility: AMBULARY SURGERY CENTER | Age: 54
End: 2019-10-10
Payer: COMMERCIAL

## 2019-10-10 ENCOUNTER — ANESTHESIA EVENT (OUTPATIENT)
Dept: GASTROENTEROLOGY | Facility: HOSPITAL | Age: 54
End: 2019-10-10

## 2019-10-10 ENCOUNTER — OFFICE VISIT (OUTPATIENT)
Dept: UROLOGY | Facility: CLINIC | Age: 54
End: 2019-10-10
Payer: COMMERCIAL

## 2019-10-10 VITALS
SYSTOLIC BLOOD PRESSURE: 122 MMHG | DIASTOLIC BLOOD PRESSURE: 84 MMHG | BODY MASS INDEX: 38.94 KG/M2 | HEIGHT: 70 IN | HEART RATE: 66 BPM | WEIGHT: 272 LBS

## 2019-10-10 DIAGNOSIS — R39.9 LOWER URINARY TRACT SYMPTOMS: ICD-10-CM

## 2019-10-10 DIAGNOSIS — Z48.815 ENCOUNTER FOR SURGICAL AFTERCARE FOLLOWING SURGERY OF DIGESTIVE SYSTEM: ICD-10-CM

## 2019-10-10 DIAGNOSIS — R30.0 DYSURIA: ICD-10-CM

## 2019-10-10 DIAGNOSIS — K91.2 POSTSURGICAL MALABSORPTION: ICD-10-CM

## 2019-10-10 DIAGNOSIS — C61 PROSTATE CANCER (HCC): Primary | ICD-10-CM

## 2019-10-10 DIAGNOSIS — Z31.41 FERTILITY TESTING: ICD-10-CM

## 2019-10-10 DIAGNOSIS — C61 PROSTATE CANCER (HCC): ICD-10-CM

## 2019-10-10 LAB
25(OH)D3 SERPL-MCNC: 26.3 NG/ML (ref 30–100)
ALBUMIN SERPL BCP-MCNC: 4 G/DL (ref 3.5–5)
ALP SERPL-CCNC: 33 U/L (ref 46–116)
ALT SERPL W P-5'-P-CCNC: 24 U/L (ref 12–78)
ANION GAP SERPL CALCULATED.3IONS-SCNC: 5 MMOL/L (ref 4–13)
AST SERPL W P-5'-P-CCNC: 16 U/L (ref 5–45)
BACTERIA UR QL AUTO: ABNORMAL /HPF
BASOPHILS # BLD AUTO: 0.02 THOUSANDS/ΜL (ref 0–0.1)
BASOPHILS NFR BLD AUTO: 1 % (ref 0–1)
BILIRUB SERPL-MCNC: 0.5 MG/DL (ref 0.2–1)
BILIRUB UR QL STRIP: NEGATIVE
BUN SERPL-MCNC: 17 MG/DL (ref 5–25)
CALCIUM SERPL-MCNC: 9.4 MG/DL (ref 8.3–10.1)
CHLORIDE SERPL-SCNC: 109 MMOL/L (ref 100–108)
CLARITY UR: CLEAR
CO2 SERPL-SCNC: 28 MMOL/L (ref 21–32)
COLOR UR: YELLOW
CREAT SERPL-MCNC: 0.96 MG/DL (ref 0.6–1.3)
EOSINOPHIL # BLD AUTO: 0.1 THOUSAND/ΜL (ref 0–0.61)
EOSINOPHIL NFR BLD AUTO: 3 % (ref 0–6)
ERYTHROCYTE [DISTWIDTH] IN BLOOD BY AUTOMATED COUNT: 13 % (ref 11.6–15.1)
GFR SERPL CREATININE-BSD FRML MDRD: 104 ML/MIN/1.73SQ M
GLUCOSE P FAST SERPL-MCNC: 90 MG/DL (ref 65–99)
GLUCOSE UR STRIP-MCNC: NEGATIVE MG/DL
HCT VFR BLD AUTO: 42 % (ref 36.5–49.3)
HGB BLD-MCNC: 13.6 G/DL (ref 12–17)
HGB UR QL STRIP.AUTO: NEGATIVE
HYALINE CASTS #/AREA URNS LPF: ABNORMAL /LPF
IMM GRANULOCYTES # BLD AUTO: 0 THOUSAND/UL (ref 0–0.2)
IMM GRANULOCYTES NFR BLD AUTO: 0 % (ref 0–2)
KETONES UR STRIP-MCNC: NEGATIVE MG/DL
LEUKOCYTE ESTERASE UR QL STRIP: ABNORMAL
LYMPHOCYTES # BLD AUTO: 1.05 THOUSANDS/ΜL (ref 0.6–4.47)
LYMPHOCYTES NFR BLD AUTO: 35 % (ref 14–44)
MCH RBC QN AUTO: 29.4 PG (ref 26.8–34.3)
MCHC RBC AUTO-ENTMCNC: 32.4 G/DL (ref 31.4–37.4)
MCV RBC AUTO: 91 FL (ref 82–98)
MONOCYTES # BLD AUTO: 0.3 THOUSAND/ΜL (ref 0.17–1.22)
MONOCYTES NFR BLD AUTO: 10 % (ref 4–12)
NEUTROPHILS # BLD AUTO: 1.57 THOUSANDS/ΜL (ref 1.85–7.62)
NEUTS SEG NFR BLD AUTO: 51 % (ref 43–75)
NITRITE UR QL STRIP: NEGATIVE
NON-SQ EPI CELLS URNS QL MICRO: ABNORMAL /HPF
NRBC BLD AUTO-RTO: 0 /100 WBCS
PH UR STRIP.AUTO: 6 [PH]
PLATELET # BLD AUTO: 148 THOUSANDS/UL (ref 149–390)
PMV BLD AUTO: 11.2 FL (ref 8.9–12.7)
POTASSIUM SERPL-SCNC: 3.5 MMOL/L (ref 3.5–5.3)
PROT SERPL-MCNC: 7.5 G/DL (ref 6.4–8.2)
PROT UR STRIP-MCNC: ABNORMAL MG/DL
PSA SERPL-MCNC: 0.2 NG/ML (ref 0–4)
RBC # BLD AUTO: 4.63 MILLION/UL (ref 3.88–5.62)
RBC #/AREA URNS AUTO: ABNORMAL /HPF
SL AMB  POCT GLUCOSE, UA: ABNORMAL
SL AMB LEUKOCYTE ESTERASE,UA: ABNORMAL
SL AMB POCT BILIRUBIN,UA: ABNORMAL
SL AMB POCT BLOOD,UA: ABNORMAL
SL AMB POCT CLARITY,UA: CLEAR
SL AMB POCT COLOR,UA: YELLOW
SL AMB POCT KETONES,UA: ABNORMAL
SL AMB POCT NITRITE,UA: ABNORMAL
SL AMB POCT PH,UA: 5
SL AMB POCT SPECIFIC GRAVITY,UA: 1.01
SL AMB POCT URINE PROTEIN: ABNORMAL
SL AMB POCT UROBILINOGEN: 0.2
SODIUM SERPL-SCNC: 142 MMOL/L (ref 136–145)
SP GR UR STRIP.AUTO: 1.02 (ref 1–1.03)
UROBILINOGEN UR QL STRIP.AUTO: 0.2 E.U./DL
WBC # BLD AUTO: 3.04 THOUSAND/UL (ref 4.31–10.16)
WBC #/AREA URNS AUTO: ABNORMAL /HPF

## 2019-10-10 PROCEDURE — 99214 OFFICE O/P EST MOD 30 MIN: CPT | Performed by: PHYSICIAN ASSISTANT

## 2019-10-10 PROCEDURE — 81001 URINALYSIS AUTO W/SCOPE: CPT | Performed by: PHYSICIAN ASSISTANT

## 2019-10-10 PROCEDURE — 85025 COMPLETE CBC W/AUTO DIFF WBC: CPT

## 2019-10-10 PROCEDURE — 82306 VITAMIN D 25 HYDROXY: CPT

## 2019-10-10 PROCEDURE — 84153 ASSAY OF PSA TOTAL: CPT

## 2019-10-10 PROCEDURE — 82525 ASSAY OF COPPER: CPT

## 2019-10-10 PROCEDURE — 36415 COLL VENOUS BLD VENIPUNCTURE: CPT

## 2019-10-10 PROCEDURE — 80053 COMPREHEN METABOLIC PANEL: CPT

## 2019-10-10 PROCEDURE — 87086 URINE CULTURE/COLONY COUNT: CPT | Performed by: PHYSICIAN ASSISTANT

## 2019-10-10 PROCEDURE — 81002 URINALYSIS NONAUTO W/O SCOPE: CPT | Performed by: PHYSICIAN ASSISTANT

## 2019-10-10 RX ORDER — TAMSULOSIN HYDROCHLORIDE 0.4 MG/1
0.4 CAPSULE ORAL 2 TIMES DAILY
Qty: 180 CAPSULE | Refills: 3 | Status: SHIPPED | OUTPATIENT
Start: 2019-10-10 | End: 2020-05-15 | Stop reason: SDUPTHER

## 2019-10-10 RX ORDER — CIPROFLOXACIN 500 MG/1
500 TABLET, FILM COATED ORAL EVERY 12 HOURS SCHEDULED
Qty: 20 TABLET | Refills: 0 | Status: SHIPPED | OUTPATIENT
Start: 2019-10-10 | End: 2019-10-11 | Stop reason: ALTCHOICE

## 2019-10-10 NOTE — TELEPHONE ENCOUNTER
----- Message from Hossein Tena PA-C sent at 10/10/2019  3:41 PM EDT -----  Please let him know his PSA looks great, 0 2

## 2019-10-11 ENCOUNTER — HOSPITAL ENCOUNTER (OUTPATIENT)
Dept: GASTROENTEROLOGY | Facility: HOSPITAL | Age: 54
Setting detail: OUTPATIENT SURGERY
Discharge: HOME | End: 2019-10-11
Attending: SURGERY
Payer: COMMERCIAL

## 2019-10-11 ENCOUNTER — ANESTHESIA (OUTPATIENT)
Dept: GASTROENTEROLOGY | Facility: HOSPITAL | Age: 54
End: 2019-10-11

## 2019-10-11 VITALS
WEIGHT: 268.3 LBS | RESPIRATION RATE: 18 BRPM | DIASTOLIC BLOOD PRESSURE: 85 MMHG | OXYGEN SATURATION: 98 % | HEIGHT: 70 IN | SYSTOLIC BLOOD PRESSURE: 149 MMHG | HEART RATE: 72 BPM | TEMPERATURE: 97.6 F | BODY MASS INDEX: 38.41 KG/M2

## 2019-10-11 DIAGNOSIS — E66.01 MORBID OBESITY (HCC): ICD-10-CM

## 2019-10-11 DIAGNOSIS — K91.2 POSTSURGICAL MALABSORPTION: ICD-10-CM

## 2019-10-11 LAB — BACTERIA UR CULT: NORMAL

## 2019-10-11 PROCEDURE — 43239 EGD BIOPSY SINGLE/MULTIPLE: CPT | Performed by: SURGERY

## 2019-10-11 PROCEDURE — 88305 TISSUE EXAM BY PATHOLOGIST: CPT | Performed by: PATHOLOGY

## 2019-10-11 RX ORDER — MIDAZOLAM HYDROCHLORIDE 1 MG/ML
INJECTION INTRAMUSCULAR; INTRAVENOUS AS NEEDED
Status: DISCONTINUED | OUTPATIENT
Start: 2019-10-11 | End: 2019-10-11 | Stop reason: SURG

## 2019-10-11 RX ORDER — PROPOFOL 10 MG/ML
INJECTION, EMULSION INTRAVENOUS AS NEEDED
Status: DISCONTINUED | OUTPATIENT
Start: 2019-10-11 | End: 2019-10-11 | Stop reason: SURG

## 2019-10-11 RX ORDER — LIDOCAINE HYDROCHLORIDE 10 MG/ML
INJECTION, SOLUTION INFILTRATION; PERINEURAL AS NEEDED
Status: DISCONTINUED | OUTPATIENT
Start: 2019-10-11 | End: 2019-10-11 | Stop reason: SURG

## 2019-10-11 RX ORDER — SODIUM CHLORIDE, SODIUM LACTATE, POTASSIUM CHLORIDE, CALCIUM CHLORIDE 600; 310; 30; 20 MG/100ML; MG/100ML; MG/100ML; MG/100ML
100 INJECTION, SOLUTION INTRAVENOUS CONTINUOUS
Status: DISCONTINUED | OUTPATIENT
Start: 2019-10-11 | End: 2019-10-15 | Stop reason: HOSPADM

## 2019-10-11 RX ORDER — KETAMINE HYDROCHLORIDE 50 MG/ML
INJECTION, SOLUTION, CONCENTRATE INTRAMUSCULAR; INTRAVENOUS AS NEEDED
Status: DISCONTINUED | OUTPATIENT
Start: 2019-10-11 | End: 2019-10-11 | Stop reason: SURG

## 2019-10-11 RX ORDER — GLYCOPYRROLATE 0.2 MG/ML
INJECTION INTRAMUSCULAR; INTRAVENOUS AS NEEDED
Status: DISCONTINUED | OUTPATIENT
Start: 2019-10-11 | End: 2019-10-11 | Stop reason: SURG

## 2019-10-11 RX ADMIN — MIDAZOLAM HYDROCHLORIDE 2 MG: 1 INJECTION, SOLUTION INTRAMUSCULAR; INTRAVENOUS at 11:16

## 2019-10-11 RX ADMIN — SODIUM CHLORIDE, SODIUM LACTATE, POTASSIUM CHLORIDE, AND CALCIUM CHLORIDE 100 ML/HR: .6; .31; .03; .02 INJECTION, SOLUTION INTRAVENOUS at 10:55

## 2019-10-11 RX ADMIN — TOPICAL ANESTHETIC 1 SPRAY: 200 SPRAY DENTAL; PERIODONTAL at 11:15

## 2019-10-11 RX ADMIN — LIDOCAINE HYDROCHLORIDE 50 MG: 10 INJECTION, SOLUTION INFILTRATION; PERINEURAL at 11:03

## 2019-10-11 RX ADMIN — GLYCOPYRROLATE 0.2 MG: 0.2 INJECTION, SOLUTION INTRAMUSCULAR; INTRAVENOUS at 10:58

## 2019-10-11 RX ADMIN — PROPOFOL 150 MG: 10 INJECTION, EMULSION INTRAVENOUS at 11:03

## 2019-10-11 RX ADMIN — KETAMINE HYDROCHLORIDE 10 MG: 50 INJECTION INTRAMUSCULAR; INTRAVENOUS at 11:17

## 2019-10-11 RX ADMIN — KETAMINE HYDROCHLORIDE 15 MG: 50 INJECTION INTRAMUSCULAR; INTRAVENOUS at 11:03

## 2019-10-11 NOTE — ANESTHESIA POSTPROCEDURE EVALUATION
Post-Op Assessment Note    CV Status:  Stable    Pain management: adequate     Mental Status:  Awake and sleepy   Hydration Status:  Euvolemic   PONV Controlled:  Controlled   Airway Patency:  Patent   Post Op Vitals Reviewed: Yes      Staff: CRNA           BP   150/83   Temp      Pulse  84   Resp   20   SpO2   99

## 2019-10-11 NOTE — ANESTHESIA PREPROCEDURE EVALUATION
Review of Systems/Medical History  Patient summary reviewed    No history of anesthetic complications     Cardiovascular  EKG reviewed, Hyperlipidemia, Hypertension controlled, No dysrhythmias , No angina ,    Pulmonary  Asthma , well controlled/ stable Last rescue: > 1 year ago , Sleep apnea CPAP,        GI/Hepatic       Negative  ROS        Endo/Other  Diabetes well controlled type 2 Diet controlled, History of thyroid disease ,   Obesity    GYN       Hematology  Negative hematology ROS      Musculoskeletal    Arthritis     Neurology  Negative neurology ROS   Headaches,    Psychology   Negative psychology ROS              Physical Exam    Airway    Mallampati score: II  TM Distance: >3 FB  Neck ROM: full     Dental   No notable dental hx     Cardiovascular  Rhythm: regular, Rate: normal,     Pulmonary  Breath sounds clear to auscultation,     Other Findings        Anesthesia Plan  ASA Score- 3     Anesthesia Type- IV sedation with anesthesia with ASA Monitors  Additional Monitors:   Airway Plan:         Plan Factors-  Patient did not smoke on day of surgery  Induction- intravenous  Postoperative Plan-     Informed Consent- Anesthetic plan and risks discussed with patient  I personally reviewed this patient with the CRNA  Discussed and agreed on the Anesthesia Plan with the CRNA  Jeanie Lopez

## 2019-10-11 NOTE — DISCHARGE INSTRUCTIONS
Upper Endoscopy   WHAT YOU NEED TO KNOW:   An upper endoscopy is also called an upper gastrointestinal (GI) endoscopy, or an esophagogastroduodenoscopy (EGD)  You may feel bloated, gassy, or have some abdominal discomfort after your procedure  Your throat may be sore for 24 to 36 hours  You may burp or pass gas from air that is still inside your body  DISCHARGE INSTRUCTIONS:   Call 911 for any of the following:   · You have sudden chest pain or trouble breathing  Seek care immediately if:   · You feel dizzy or faint  · You have trouble swallowing  · Your bowel movements are very dark or black  · Your abdomen is hard and firm and you have severe pain  · You vomit blood  Contact your healthcare provider if:   · You feel full or bloated and cannot burp or pass gas  · You have not had a bowel movement for 3 days after your procedure  · You have neck pain  · You have a fever or chills  · You have nausea or are vomiting  · You have a rash or hives  · You have questions or concerns about your endoscopy  Relieve a sore throat:  Suck on throat lozenges or crushed ice  Gargle with a small amount of warm salt water  Mix 1 teaspoon of salt and 1 cup of warm water to make salt water  Relieve gas and discomfort from bloating:  Lie on your right side with a heating pad on your abdomen  Take short walks to help pass gas  Eat small meals until bloating is relieved  Rest after your procedure: You have been given medicine to relax you  Do not  drive or make important decisions until the day after your procedure  Return to your normal activity as directed  You can usually return to work the day after your procedure  Follow up with your healthcare provider as directed:  Write down your questions so you remember to ask them during your visits     © 2017 [address] is for End User's use only and may not be sold, redistributed or otherwise used for commercial purposes  All illustrations and images included in CareNotes® are the copyrighted property of A D A M [employer] Inc  or Chema Perea  The above information is an  only  It is not intended as medical advice for individual conditions or treatments  Talk to your doctor, nurse or pharmacist before following any medical regimen to see if it is safe and effective for you

## 2019-10-11 NOTE — H&P
This is a 49 y o  male with a history of morbid obesity and Body mass index is 38 5 kg/m²  Here for an EGD to evaluate the anatomy of the GI tract and to rule out the presence of H  Pylori  He is status post LSG  He has severe GERD and weight regain  Will also rule out the presence of Ramírez's esophagus and erosive esophagitis  Physical Exam    /75   Pulse 66   Temp 97 9 °F (36 6 °C) (Temporal)   Resp 16   Ht 5' 10" (1 778 m)   Wt 122 kg (268 lb 4 8 oz)   SpO2 97%   BMI 38 50 kg/m²    AAOx3  RRR  CTA B  Abdomen benign  A/P:    This is a 49 y o  male with a history of morbid obesity and Body mass index is 38 5 kg/m²       Will proceed with the EGD and biopsies        Keerthi Avila MD  10/11/2019  10:42 AM

## 2019-10-12 LAB — COPPER SERPL-MCNC: 87 UG/DL (ref 72–166)

## 2019-10-15 ENCOUNTER — TELEPHONE (OUTPATIENT)
Dept: BARIATRICS | Facility: CLINIC | Age: 54
End: 2019-10-15

## 2019-10-15 DIAGNOSIS — K91.2 POSTSURGICAL MALABSORPTION: Primary | ICD-10-CM

## 2019-10-15 DIAGNOSIS — E55.9 VITAMIN D INSUFFICIENCY: ICD-10-CM

## 2019-10-15 NOTE — TELEPHONE ENCOUNTER
Spoke with the patient - EGD on Friday with Dr Ledy Moreno - noted "mild antral gastritis, enlarged sleeve and irregular Z-line"  Biopsies pending - will f/u with results  Advised him to obtain the ordered UGI stat before his appointment with Dr Gwen Rg on Friday  Also advised him to take an additional 3,000IU Vitamin D daily with food and will repeat labs in 3-4 months  Noted mildly low WBC and platelets - advised him to notify his PCP  He verbalizes understanding

## 2019-10-16 ENCOUNTER — HOSPITAL ENCOUNTER (OUTPATIENT)
Dept: RADIOLOGY | Facility: HOSPITAL | Age: 54
Discharge: HOME | End: 2019-10-16
Payer: COMMERCIAL

## 2019-10-16 DIAGNOSIS — R13.10 DYSPHAGIA: ICD-10-CM

## 2019-10-16 DIAGNOSIS — R10.13 EPIGASTRIC PAIN: ICD-10-CM

## 2019-10-16 DIAGNOSIS — Z48.815 ENCOUNTER FOR SURGICAL AFTERCARE FOLLOWING SURGERY OF DIGESTIVE SYSTEM: ICD-10-CM

## 2019-10-16 PROCEDURE — 74240 X-RAY XM UPR GI TRC 1CNTRST: CPT

## 2019-10-18 ENCOUNTER — OFFICE VISIT (OUTPATIENT)
Dept: BARIATRICS | Facility: CLINIC | Age: 54
End: 2019-10-18
Payer: COMMERCIAL

## 2019-10-18 VITALS
TEMPERATURE: 98.2 F | HEIGHT: 70 IN | SYSTOLIC BLOOD PRESSURE: 116 MMHG | HEART RATE: 76 BPM | DIASTOLIC BLOOD PRESSURE: 60 MMHG | WEIGHT: 264.5 LBS | RESPIRATION RATE: 18 BRPM | BODY MASS INDEX: 37.87 KG/M2

## 2019-10-18 DIAGNOSIS — K21.9 GASTROESOPHAGEAL REFLUX DISEASE WITHOUT ESOPHAGITIS: ICD-10-CM

## 2019-10-18 DIAGNOSIS — Z98.84 BARIATRIC SURGERY STATUS: Primary | ICD-10-CM

## 2019-10-18 DIAGNOSIS — E66.9 OBESITY, CLASS II, BMI 35-39.9: ICD-10-CM

## 2019-10-18 PROCEDURE — 99214 OFFICE O/P EST MOD 30 MIN: CPT | Performed by: SURGERY

## 2019-10-18 NOTE — PROGRESS NOTES
Assessment/Plan:     Patient ID: Cecilia Arevalo is a 49 y o  male  Bariatric surgery status  -s/p Vertical Sleeve Gastrectomy with Dr Claudell Payment on 6/20/2017  Presents to the office today to review EGD results from 10/11/2019 due to weight regain and regurgitation/reflux  EGD 10/11/2019  IMPRESSION:  Mild antral gastritis; irregular z-line; enlarged sleeve gastrectomy stomach    · EWL is 34%, which places the patient behind schedule for expected post surgical weight loss at this time  · Ulcer Risk assessment:  · Discussion about strict AVOIDANCE of alcohol, NSAIDS, prednisone, and nicotine of all forms  · Follow up in 3 months to discuss revision options    Obesity, Class II, BMI 35-39 9  - Continue to maintain healthy weight  - Exercise as tolerated   - Meet with dietitian and  to begin revision pathway   - Start 3 months of [facility]     Gastroesophageal reflux disease without esophagitis  - Continue omeprazole once daily   - Continue to maintain healthy weight loss     Subjective:      Patient ID: Cecilia Arevalo is a 49 y o  male  -s/p Vertical Sleeve Gastrectomy with Dr Claudell Payment on 6/20/2017  Presents to the office today to discuss weight regain and regurgitation symptoms  EGD 10/11/2019 completed and here to review results  Tolerating diet without issues; denies N/V, dysphagia  States that he cannot eat past 5pm due to symptoms of regurgitation that wake him from sleep  States that he still feels restriction while eating and is making slightly unhealthy choices but does not understand his recent weight regain  Originally, after his surgery he lost 40-50lbs at his most  He kept this off for 1 year and then over the past few months had regain issues  He states he is disappointed with his overall weight loss  Initial: 300  Current: 264 5  EWL: 34%  Genaro: 232  Current BMI is Body mass index is 37 95 kg/m²      The following portions of the patient's history were reviewed and updated as appropriate: allergies, current medications, past family history, past medical history, past social history, past surgical history and problem list     Review of Systems   Constitutional: Positive for unexpected weight change  Negative for activity change and appetite change  HENT: Negative for trouble swallowing          + heartburn   Respiratory: Positive for choking (regurgitation at night)  Gastrointestinal: Negative for abdominal distention, abdominal pain, nausea and vomiting  Objective:    /60 (BP Location: Left arm, Patient Position: Sitting, Cuff Size: Adult)   Pulse 76   Temp 98 2 °F (36 8 °C) (Tympanic)   Resp 18   Ht 5' 10" (1 778 m)   Wt 120 kg (264 lb 8 oz)   BMI 37 95 kg/m²      Physical Exam   Constitutional: He is oriented to person, place, and time  He appears well-developed and well-nourished  No distress  HENT:   Head: Normocephalic and atraumatic  Eyes: Conjunctivae and EOM are normal  No scleral icterus  Neck: Normal range of motion  Neck supple  Cardiovascular: Normal rate  Pulmonary/Chest: Effort normal  No respiratory distress  Abdominal: Soft  He exhibits no distension  There is no tenderness  Neurological: He is alert and oriented to person, place, and time  Skin: Skin is warm and dry  Psychiatric: He has a normal mood and affect  His behavior is normal    Vitals reviewed

## 2019-11-07 NOTE — PROGRESS NOTES
Assessment and plan:       1  Lower urinary tract symptoms  - Patient had previously noticed an improvement with LUTS on tamsulosin BID  This has been since decreased to once daily and was again increased to BID recently with symptom improvement  - He was provided with a renewed prescription for twice daily dosing  2  [place] 7 prostate cancer status post external beam radiation (1/9/17) managed by Dr Rosey Velez  - Recent PSA was 0 2  - He will return in approximately 5 months with PSA prior  Joey Kent PA-C      Chief Complaint     Chief Complaint   Patient presents with    Prostate Cancer         History of Present Illness     Dory Rowe is a 49 y o  male patient of Dr Alisha Barron with a history of [place] 7 low volume prostate cancer status post external beam radiation therapy (01/09/2017) presenting for six-month follow-up      Patient was previously under the care of a urologist at [facility]  Patient does have a positive family history of prostate cancer in his father who was diagnosed in his late 76s  Patient's PSA was 4 9 in 2016 which prompted his prostate biopsy  Pathology was reviewed at [facility] confirming 2 foci of [place] 7 disease  Patient elected to proceed with robotic assisted laparoscopic prostatectomy in August of 2016  Unfortunately, surgery had to be aborted secondary to difficulty with ventilation while in the Trendelenburg position  He subsequently completed external beam radiation therapy on 01/09/2017      Patient does have lower urinary tract symptoms which he takes tamsulosin for and is able to achieve moderate control  At 1 point he was using tamsulosin twice daily  This has been decreased by his primary care provider to once daily  He has noticed some symptom worsening  He was recently increased to twice daily dosing again  Patient had noticed the dysuria and frequency and urine was positive for blood and leukocyte esterase    He was treated with ciprofloxacin but his urine culture was negative  Burning has since resolved  PSA drawn 10/10/2019 was 0 2  Laboratory     Lab Results   Component Value Date    CREATININE 0 96 10/10/2019       Lab Results   Component Value Date    PSA 0 2 10/10/2019    PSA 0 5 01/11/2019    PSA 1 0 05/03/2018       Recent Results (from the past 1 hour(s))   POCT Measure PVR    Collection Time: 11/11/19 11:34 AM   Result Value Ref Range    POST-VOID RESIDUAL VOLUME, ML POC 16 mL         Review of Systems     Review of Systems   Constitutional: Negative for chills and fever  HENT: Positive for congestion  Eyes: Negative  Respiratory: Positive for cough  Negative for shortness of breath  Cardiovascular: Negative for chest pain  Gastrointestinal: Negative for constipation, diarrhea, nausea and vomiting  Genitourinary: Positive for frequency and urgency  Negative for difficulty urinating, dysuria, enuresis, flank pain and hematuria  Musculoskeletal: Negative  Allergies     Allergies   Allergen Reactions    Other     Pollen Extract        Physical Exam     Physical Exam   Constitutional: He is oriented to person, place, and time  He appears well-developed and well-nourished  No distress  HENT:   Head: Normocephalic and atraumatic  Right Ear: External ear normal    Left Ear: External ear normal    Nose: Nose normal    Eyes: Right eye exhibits no discharge  Left eye exhibits no discharge  No scleral icterus  Cardiovascular: Normal rate  Pulmonary/Chest: Effort normal    Musculoskeletal:   Ambulates with a cane for assistance   Neurological: He is alert and oriented to person, place, and time  Skin: Skin is warm and dry  He is not diaphoretic  Psychiatric: He has a normal mood and affect  His behavior is normal  Judgment and thought content normal    Nursing note and vitals reviewed          Vital Signs     Vitals:    11/11/19 1131   BP: 132/78   BP Location: Right arm Patient Position: Sitting   Cuff Size: Extra-Large   Pulse: 84   Weight: 124 kg (274 lb)   Height: 5' 10" (1 778 m)         Current Medications       Current Outpatient Medications:     albuterol (PROAIR HFA) 90 mcg/act inhaler, Inhale, Disp: , Rfl:     Ascorbic Acid (VITAMIN C PO), Take 1 capsule by mouth 2 (two) times a day, Disp: , Rfl:     BREO ELLIPTA 200-25 MCG/INH inhaler, Inhale 1 puff daily, Disp: , Rfl: 1    CALCIUM CITRATE PO, Take by mouth, Disp: , Rfl:     docusate sodium (COLACE) 100 mg capsule, Take 100 mg by mouth every other day, Disp: , Rfl:     esomeprazole (NEXIUM) 40 mg packet, Take by mouth, Disp: , Rfl:     fenofibrate (TRICOR) 145 mg tablet, Take 145 mg by mouth daily  , Disp: , Rfl:     flunisolide (NASALIDE) 25 MCG/ACT (0 025%) SOLN, into each nostril, Disp: , Rfl:     fluticasone (FLOVENT HFA) 110 MCG/ACT inhaler, Inhale 2 puffs, Disp: , Rfl:     hydrochlorothiazide (HYDRODIURIL) 25 mg tablet, hydrochlorothiazide 25 mg tablet, Disp: , Rfl:     lisinopril (ZESTRIL) 10 mg tablet, TAKE 1 TABLET DAILY AS DIRECTED , Disp: , Rfl:     loratadine (CLARITIN) 10 mg tablet, daily, Disp: , Rfl:     montelukast (SINGULAIR) 10 mg tablet, Take 10 mg by mouth daily at bedtime  , Disp: , Rfl:     MULTIPLE VITAMIN PO, Take 1 tablet by mouth daily, Disp: , Rfl:     pantoprazole (PROTONIX) 40 mg tablet, Take 1 tablet (40 mg total) by mouth 2 (two) times a day, Disp: 60 tablet, Rfl: 2    Potassium 99 MG TABS, Take 99 mg by mouth daily, Disp: , Rfl:     Sodium Hyaluronate (EUFLEXXA) 20 MG/2ML SOSY, 2 mL, Disp: , Rfl:     sucralfate (CARAFATE) 1 g tablet, Take 1 tablet (1 g total) by mouth 4 (four) times a day, Disp: 120 tablet, Rfl: 2    tamsulosin (FLOMAX) 0 4 mg, Take 1 capsule (0 4 mg total) by mouth 2 (two) times a day, Disp: 180 capsule, Rfl: 3    traMADol (ULTRAM) 50 mg tablet, tramadol 50 mg tablet, Disp: , Rfl:     levofloxacin (LEVAQUIN) 750 mg tablet, levofloxacin 750 mg tablet, Disp: , Rfl:       Active Problems     Patient Active Problem List   Diagnosis    Prostate cancer ([facility] [address] )    Obesity    Headache    Memory difficulty    Postsurgical malabsorption    Encounter for surgical aftercare following surgery of digestive system    Hypertension    Vitamin D insufficiency    Heartburn    Weight gain, abnormal    Dysphagia         Past Medical History     Past Medical History:   Diagnosis Date    Acid reflux     Allergy to cats     Arthritis     Asthma     Back pain     Breathing difficulty     approx July 2016 pt was having prostate surgery and surgery not completed as he developed "severe breathing problems"    Constipation     CPAP (continuous positive airway pressure) dependence     Diabetes mellitus (HCC)     HISTORY    Enlarged thyroid     Headache     High cholesterol     History of radiation therapy     last treatment 12/2016    Hypertension     Hypertriglyceridemia     Knee pain, bilateral     Memory loss     Muscle weakness     Obesity     Prostate cancer (HCC)     Risk for falls     Seasonal allergies     Shortness of breath     Sleep apnea     cpap    Unsteady gait     "knees buckle"    Use of cane as ambulatory aid     Wears glasses          Surgical History     Past Surgical History:   Procedure Laterality Date    COLONOSCOPY      ESOPHAGOGASTRODUODENOSCOPY      ESOPHAGOGASTRODUODENOSCOPY N/A 6/20/2017    Procedure: ESOPHAGOGASTRODUODENOSCOPY (EGD);   Surgeon: Sushila De Anda MD;  Location: [facility];  Service: Bariatrics    NO PAST SURGERIES      KS LAP, ANNETTE RESTRICT PROC, LONGITUDINAL GASTRECTOMY N/A 6/20/2017    Procedure: GASTRECTOMY SLEEVE LAPAROSCOPIC;  Surgeon: Sushila De Anda MD;  Location: [facility];  Service: Bariatrics    SLEEVE GASTROPLASTY      US GUIDED THYROID BIOPSY  1/31/2019         Family History     Family History   Problem Relation Age of Onset    Arthritis Mother     Prostate cancer Father          Social History     Social History       Radiology

## 2019-11-11 ENCOUNTER — OFFICE VISIT (OUTPATIENT)
Dept: UROLOGY | Facility: CLINIC | Age: 54
End: 2019-11-11
Payer: COMMERCIAL

## 2019-11-11 VITALS
HEART RATE: 84 BPM | HEIGHT: 70 IN | BODY MASS INDEX: 39.22 KG/M2 | SYSTOLIC BLOOD PRESSURE: 132 MMHG | DIASTOLIC BLOOD PRESSURE: 78 MMHG | WEIGHT: 274 LBS

## 2019-11-11 DIAGNOSIS — R39.9 LOWER URINARY TRACT SYMPTOMS: Primary | ICD-10-CM

## 2019-11-11 LAB — POST-VOID RESIDUAL VOLUME, ML POC: 16 ML

## 2019-11-11 PROCEDURE — 51798 US URINE CAPACITY MEASURE: CPT | Performed by: PHYSICIAN ASSISTANT

## 2019-11-11 PROCEDURE — 99213 OFFICE O/P EST LOW 20 MIN: CPT | Performed by: PHYSICIAN ASSISTANT

## 2019-12-16 ENCOUNTER — TELEPHONE (OUTPATIENT)
Dept: BARIATRICS | Facility: CLINIC | Age: 54
End: 2019-12-16

## 2020-02-03 ENCOUNTER — TELEPHONE (OUTPATIENT)
Dept: BARIATRICS | Facility: CLINIC | Age: 55
End: 2020-02-03

## 2020-04-21 ENCOUNTER — TELEPHONE (OUTPATIENT)
Dept: BARIATRICS | Facility: CLINIC | Age: 55
End: 2020-04-21

## 2020-04-22 ENCOUNTER — TELEPHONE (OUTPATIENT)
Dept: BARIATRICS | Facility: CLINIC | Age: 55
End: 2020-04-22

## 2020-04-29 PROBLEM — K22.70 BARRETT'S ESOPHAGUS DETERMINED BY BIOPSY: Status: ACTIVE | Noted: 2018-06-14

## 2020-05-01 ENCOUNTER — TELEPHONE (OUTPATIENT)
Dept: UROLOGY | Facility: MEDICAL CENTER | Age: 55
End: 2020-05-01

## 2020-05-14 ENCOUNTER — APPOINTMENT (OUTPATIENT)
Dept: LAB | Facility: HOSPITAL | Age: 55
End: 2020-05-14
Payer: COMMERCIAL

## 2020-05-14 DIAGNOSIS — R39.9 LOWER URINARY TRACT SYMPTOMS: ICD-10-CM

## 2020-05-14 LAB — PSA SERPL-MCNC: 0.3 NG/ML (ref 0–4)

## 2020-05-14 PROCEDURE — 84153 ASSAY OF PSA TOTAL: CPT

## 2020-05-14 RX ORDER — NAPROXEN 500 MG/1
TABLET ORAL AS NEEDED
COMMUNITY
Start: 2020-02-11 | End: 2020-12-09 | Stop reason: HOSPADM

## 2020-05-14 RX ORDER — LISINOPRIL 20 MG/1
20 TABLET ORAL DAILY
COMMUNITY
Start: 2020-03-28 | End: 2020-12-09 | Stop reason: HOSPADM

## 2020-05-14 RX ORDER — AZITHROMYCIN 250 MG/1
TABLET, FILM COATED ORAL
COMMUNITY
Start: 2019-12-27 | End: 2020-10-21 | Stop reason: ALTCHOICE

## 2020-05-14 RX ORDER — DIAZEPAM 10 MG/1
5 TABLET ORAL EVERY 8 HOURS PRN
COMMUNITY
Start: 2020-01-27 | End: 2020-10-21 | Stop reason: ALTCHOICE

## 2020-05-15 ENCOUNTER — TELEMEDICINE (OUTPATIENT)
Dept: UROLOGY | Facility: CLINIC | Age: 55
End: 2020-05-15
Payer: COMMERCIAL

## 2020-05-15 DIAGNOSIS — C61 PROSTATE CANCER (HCC): Primary | ICD-10-CM

## 2020-05-15 DIAGNOSIS — R39.9 LOWER URINARY TRACT SYMPTOMS: ICD-10-CM

## 2020-05-15 PROCEDURE — 99213 OFFICE O/P EST LOW 20 MIN: CPT | Performed by: PHYSICIAN ASSISTANT

## 2020-05-15 RX ORDER — TAMSULOSIN HYDROCHLORIDE 0.4 MG/1
0.4 CAPSULE ORAL 2 TIMES DAILY
Qty: 180 CAPSULE | Refills: 3 | Status: SHIPPED | OUTPATIENT
Start: 2020-05-15 | End: 2021-10-27 | Stop reason: SDUPTHER

## 2020-05-28 ENCOUNTER — TELEPHONE (OUTPATIENT)
Dept: BARIATRICS | Facility: CLINIC | Age: 55
End: 2020-05-28

## 2020-05-28 ENCOUNTER — TELEMEDICINE (OUTPATIENT)
Dept: BARIATRICS | Facility: CLINIC | Age: 55
End: 2020-05-28
Payer: COMMERCIAL

## 2020-05-28 VITALS — HEIGHT: 70 IN | BODY MASS INDEX: 38.63 KG/M2 | WEIGHT: 269.8 LBS

## 2020-05-28 DIAGNOSIS — R63.5 WEIGHT GAIN, ABNORMAL: ICD-10-CM

## 2020-05-28 DIAGNOSIS — K22.70 BARRETT'S ESOPHAGUS DETERMINED BY BIOPSY: Primary | ICD-10-CM

## 2020-05-28 DIAGNOSIS — Z48.815 ENCOUNTER FOR SURGICAL AFTERCARE FOLLOWING SURGERY OF DIGESTIVE SYSTEM: ICD-10-CM

## 2020-05-28 DIAGNOSIS — K91.2 POSTSURGICAL MALABSORPTION: ICD-10-CM

## 2020-05-28 DIAGNOSIS — R79.89 LOW VITAMIN D LEVEL: ICD-10-CM

## 2020-05-28 PROCEDURE — 99214 OFFICE O/P EST MOD 30 MIN: CPT | Performed by: SURGERY

## 2020-05-30 DIAGNOSIS — R12 HEARTBURN: ICD-10-CM

## 2020-05-30 DIAGNOSIS — R10.13 EPIGASTRIC PAIN: ICD-10-CM

## 2020-06-01 DIAGNOSIS — R12 HEARTBURN: ICD-10-CM

## 2020-06-01 DIAGNOSIS — R10.13 EPIGASTRIC PAIN: ICD-10-CM

## 2020-06-01 RX ORDER — PANTOPRAZOLE SODIUM 40 MG/1
40 TABLET, DELAYED RELEASE ORAL DAILY
Qty: 60 TABLET | Refills: 2 | Status: SHIPPED | OUTPATIENT
Start: 2020-06-01 | End: 2020-12-09 | Stop reason: HOSPADM

## 2020-06-02 RX ORDER — PANTOPRAZOLE SODIUM 40 MG/1
40 TABLET, DELAYED RELEASE ORAL 2 TIMES DAILY
Qty: 60 TABLET | Refills: 0 | OUTPATIENT
Start: 2020-06-02

## 2020-06-08 ENCOUNTER — CLINICAL SUPPORT (OUTPATIENT)
Dept: BARIATRICS | Facility: CLINIC | Age: 55
End: 2020-06-08

## 2020-06-08 VITALS — BODY MASS INDEX: 38.74 KG/M2 | WEIGHT: 270 LBS

## 2020-06-08 DIAGNOSIS — K21.9 GERD (GASTROESOPHAGEAL REFLUX DISEASE): Primary | ICD-10-CM

## 2020-06-08 DIAGNOSIS — K91.2 POSTSURGICAL MALABSORPTION: Primary | ICD-10-CM

## 2020-06-08 DIAGNOSIS — E66.01 CLASS 2 SEVERE OBESITY WITH SERIOUS COMORBIDITY AND BODY MASS INDEX (BMI) OF 38.0 TO 38.9 IN ADULT, UNSPECIFIED OBESITY TYPE (HCC): ICD-10-CM

## 2020-06-08 PROCEDURE — RECHECK: Performed by: DIETITIAN, REGISTERED

## 2020-06-17 ENCOUNTER — APPOINTMENT (OUTPATIENT)
Dept: LAB | Facility: CLINIC | Age: 55
End: 2020-06-17
Payer: COMMERCIAL

## 2020-06-17 DIAGNOSIS — R79.89 LOW VITAMIN D LEVEL: ICD-10-CM

## 2020-06-17 DIAGNOSIS — Z48.815 ENCOUNTER FOR SURGICAL AFTERCARE FOLLOWING SURGERY OF DIGESTIVE SYSTEM: ICD-10-CM

## 2020-06-17 DIAGNOSIS — K91.2 POSTSURGICAL MALABSORPTION: ICD-10-CM

## 2020-06-17 DIAGNOSIS — E55.9 VITAMIN D INSUFFICIENCY: ICD-10-CM

## 2020-06-17 LAB
25(OH)D3 SERPL-MCNC: 53.7 NG/ML (ref 30–100)
ALBUMIN SERPL BCP-MCNC: 4.4 G/DL (ref 3.5–5)
ALP SERPL-CCNC: 32 U/L (ref 46–116)
ALT SERPL W P-5'-P-CCNC: 29 U/L (ref 12–78)
ANION GAP SERPL CALCULATED.3IONS-SCNC: 3 MMOL/L (ref 4–13)
AST SERPL W P-5'-P-CCNC: 13 U/L (ref 5–45)
BASOPHILS # BLD AUTO: 0.03 THOUSANDS/ΜL (ref 0–0.1)
BASOPHILS NFR BLD AUTO: 1 % (ref 0–1)
BILIRUB SERPL-MCNC: 0.55 MG/DL (ref 0.2–1)
BUN SERPL-MCNC: 19 MG/DL (ref 5–25)
CALCIUM SERPL-MCNC: 9.6 MG/DL (ref 8.3–10.1)
CHLORIDE SERPL-SCNC: 110 MMOL/L (ref 100–108)
CO2 SERPL-SCNC: 28 MMOL/L (ref 21–32)
CREAT SERPL-MCNC: 1.11 MG/DL (ref 0.6–1.3)
EOSINOPHIL # BLD AUTO: 0.17 THOUSAND/ΜL (ref 0–0.61)
EOSINOPHIL NFR BLD AUTO: 4 % (ref 0–6)
ERYTHROCYTE [DISTWIDTH] IN BLOOD BY AUTOMATED COUNT: 13.6 % (ref 11.6–15.1)
FERRITIN SERPL-MCNC: 183 NG/ML (ref 8–388)
FOLATE SERPL-MCNC: >20 NG/ML (ref 3.1–17.5)
GFR SERPL CREATININE-BSD FRML MDRD: 87 ML/MIN/1.73SQ M
GLUCOSE P FAST SERPL-MCNC: 104 MG/DL (ref 65–99)
HCT VFR BLD AUTO: 40.2 % (ref 36.5–49.3)
HGB BLD-MCNC: 12.9 G/DL (ref 12–17)
IMM GRANULOCYTES # BLD AUTO: 0.01 THOUSAND/UL (ref 0–0.2)
IMM GRANULOCYTES NFR BLD AUTO: 0 % (ref 0–2)
IRON SATN MFR SERPL: 21 %
IRON SERPL-MCNC: 90 UG/DL (ref 65–175)
LYMPHOCYTES # BLD AUTO: 0.97 THOUSANDS/ΜL (ref 0.6–4.47)
LYMPHOCYTES NFR BLD AUTO: 23 % (ref 14–44)
MCH RBC QN AUTO: 29.1 PG (ref 26.8–34.3)
MCHC RBC AUTO-ENTMCNC: 32.1 G/DL (ref 31.4–37.4)
MCV RBC AUTO: 91 FL (ref 82–98)
MONOCYTES # BLD AUTO: 0.33 THOUSAND/ΜL (ref 0.17–1.22)
MONOCYTES NFR BLD AUTO: 8 % (ref 4–12)
NEUTROPHILS # BLD AUTO: 2.74 THOUSANDS/ΜL (ref 1.85–7.62)
NEUTS SEG NFR BLD AUTO: 64 % (ref 43–75)
NRBC BLD AUTO-RTO: 0 /100 WBCS
PLATELET # BLD AUTO: 151 THOUSANDS/UL (ref 149–390)
PMV BLD AUTO: 11.2 FL (ref 8.9–12.7)
POTASSIUM SERPL-SCNC: 3.5 MMOL/L (ref 3.5–5.3)
PROT SERPL-MCNC: 8 G/DL (ref 6.4–8.2)
PTH-INTACT SERPL-MCNC: 18.4 PG/ML (ref 18.4–80.1)
RBC # BLD AUTO: 4.43 MILLION/UL (ref 3.88–5.62)
SODIUM SERPL-SCNC: 141 MMOL/L (ref 136–145)
TIBC SERPL-MCNC: 433 UG/DL (ref 250–450)
VIT B12 SERPL-MCNC: 863 PG/ML (ref 100–900)
WBC # BLD AUTO: 4.25 THOUSAND/UL (ref 4.31–10.16)

## 2020-06-17 PROCEDURE — 83540 ASSAY OF IRON: CPT

## 2020-06-17 PROCEDURE — 83550 IRON BINDING TEST: CPT

## 2020-06-17 PROCEDURE — 84590 ASSAY OF VITAMIN A: CPT

## 2020-06-17 PROCEDURE — 82728 ASSAY OF FERRITIN: CPT

## 2020-06-17 PROCEDURE — 82306 VITAMIN D 25 HYDROXY: CPT

## 2020-06-17 PROCEDURE — 82607 VITAMIN B-12: CPT

## 2020-06-17 PROCEDURE — 36415 COLL VENOUS BLD VENIPUNCTURE: CPT

## 2020-06-17 PROCEDURE — 82746 ASSAY OF FOLIC ACID SERUM: CPT

## 2020-06-17 PROCEDURE — 83970 ASSAY OF PARATHORMONE: CPT

## 2020-06-17 PROCEDURE — 84630 ASSAY OF ZINC: CPT

## 2020-06-17 PROCEDURE — 80053 COMPREHEN METABOLIC PANEL: CPT

## 2020-06-17 PROCEDURE — 84425 ASSAY OF VITAMIN B-1: CPT

## 2020-06-17 PROCEDURE — 85025 COMPLETE CBC W/AUTO DIFF WBC: CPT

## 2020-06-20 LAB
VIT A SERPL-MCNC: 48.6 UG/DL (ref 20.1–62)
ZINC SERPL-MCNC: 94 UG/DL (ref 56–134)

## 2020-06-21 LAB — VIT B1 BLD-SCNC: 153.7 NMOL/L (ref 66.5–200)

## 2020-06-22 ENCOUNTER — TELEPHONE (OUTPATIENT)
Dept: BARIATRICS | Facility: CLINIC | Age: 55
End: 2020-06-22

## 2020-07-09 ENCOUNTER — CONSULT (OUTPATIENT)
Dept: CARDIOLOGY CLINIC | Facility: CLINIC | Age: 55
End: 2020-07-09
Payer: COMMERCIAL

## 2020-07-09 ENCOUNTER — OFFICE VISIT (OUTPATIENT)
Dept: BARIATRICS | Facility: CLINIC | Age: 55
End: 2020-07-09
Payer: COMMERCIAL

## 2020-07-09 VITALS
DIASTOLIC BLOOD PRESSURE: 78 MMHG | BODY MASS INDEX: 38.31 KG/M2 | TEMPERATURE: 98.1 F | SYSTOLIC BLOOD PRESSURE: 118 MMHG | HEART RATE: 73 BPM | WEIGHT: 267.6 LBS | HEIGHT: 70 IN

## 2020-07-09 VITALS
BODY MASS INDEX: 37.94 KG/M2 | SYSTOLIC BLOOD PRESSURE: 108 MMHG | HEIGHT: 70 IN | TEMPERATURE: 98.8 F | DIASTOLIC BLOOD PRESSURE: 82 MMHG | HEART RATE: 68 BPM | WEIGHT: 265 LBS | OXYGEN SATURATION: 98 %

## 2020-07-09 DIAGNOSIS — Z86.39 HISTORY OF DIABETES MELLITUS: ICD-10-CM

## 2020-07-09 DIAGNOSIS — K22.70 BARRETT'S ESOPHAGUS DETERMINED BY BIOPSY: ICD-10-CM

## 2020-07-09 DIAGNOSIS — I10 ESSENTIAL HYPERTENSION: ICD-10-CM

## 2020-07-09 DIAGNOSIS — E78.2 MIXED HYPERLIPIDEMIA: ICD-10-CM

## 2020-07-09 DIAGNOSIS — R07.2 PRECORDIAL CHEST PAIN: ICD-10-CM

## 2020-07-09 DIAGNOSIS — K91.2 POSTSURGICAL MALABSORPTION: ICD-10-CM

## 2020-07-09 DIAGNOSIS — E66.9 OBESITY, CLASS II, BMI 35-39.9: Primary | ICD-10-CM

## 2020-07-09 DIAGNOSIS — R06.00 DYSPNEA, UNSPECIFIED TYPE: ICD-10-CM

## 2020-07-09 DIAGNOSIS — Z01.818 PREOPERATIVE CLEARANCE: Primary | ICD-10-CM

## 2020-07-09 DIAGNOSIS — I10 HYPERTENSION: ICD-10-CM

## 2020-07-09 DIAGNOSIS — K21.9 GERD (GASTROESOPHAGEAL REFLUX DISEASE): ICD-10-CM

## 2020-07-09 PROBLEM — E66.812 OBESITY, CLASS II, BMI 35-39.9: Status: ACTIVE | Noted: 2017-06-21

## 2020-07-09 PROCEDURE — 99204 OFFICE O/P NEW MOD 45 MIN: CPT | Performed by: INTERNAL MEDICINE

## 2020-07-09 PROCEDURE — 99214 OFFICE O/P EST MOD 30 MIN: CPT | Performed by: PHYSICIAN ASSISTANT

## 2020-07-09 PROCEDURE — 93000 ELECTROCARDIOGRAM COMPLETE: CPT | Performed by: INTERNAL MEDICINE

## 2020-07-09 NOTE — PROGRESS NOTES
Cardiology Consultation     Inocencio Alas  [id number]  1965  [address] [facility] [address]    1  Essential hypertension  POCT ECG    Echo complete with contrast if indicated    NM myocardial perfusion spect (rx stress and/or rest)   2  GERD (gastroesophageal reflux disease)  Ambulatory referral to Cardiology   3  Mixed hyperlipidemia  POCT ECG    Echo complete with contrast if indicated    NM myocardial perfusion spect (rx stress and/or rest)   4  Dyspnea, unspecified type  POCT ECG    Echo complete with contrast if indicated    NM myocardial perfusion spect (rx stress and/or rest)   5  Precordial chest pain  POCT ECG    Echo complete with contrast if indicated    NM myocardial perfusion spect (rx stress and/or rest)   6  Preoperative clearance  POCT ECG    Echo complete with contrast if indicated    NM myocardial perfusion spect (rx stress and/or rest)       Chief Complaint:  Preoperative cardiac risk assessment for bariatric revision surgery    HPI:  52-year-old male with hypertension, hyperlipidemia, sleep apnea, morbidly obese status post gastric sleeve surgery in 2017 was referred from general surgery clinic for preoperative cardiac risk assessment for revision surgery  Patient is seen 1st time in Cardiology Clinic today  Patient is getting recurrent heartburn for quite some time  Regained significant weight for last 1 year  He also complains of occasional chest pain  He had one or 2 episode where felt short of breath while laying down  Does not exert at baseline due to bad knees  Patient had shortness of breath in April of 2020 which was different from asthma attack so he went to emergency department   Troponin during ED visit was negative    Denies nausea, vomiting, diaphoresis, dizziness, orthopnea leg edema or loss of consciousness  Denies personal history of myocardial infarction, TIA/CVA, heart failure or arrhythmia  Patient had cardiac stress test before gastric bypass in 2017 and was okay as per patient  Patient has history of diabetes and sleep apnea os using CPAP but after gastric bypass was able to come of diabetic medication and CPAP    Social History:  Ex-smoker quit many years back, denies illicit drug use or alcohol abuse  Family History:  No family history of coronary artery disease    Labs from June of 2020 reviewed    Creatinine 1 1, hemoglobin 12 9, TSH in January of 2019 within normal limits      Review of Systems:  Review of system negative except as mentioned above    Patient Active Problem List   Diagnosis    Prostate cancer ([facility] [address] )    Obesity    Headache    Memory difficulty    Postsurgical malabsorption    Encounter for surgical aftercare following surgery of digestive system    Hypertension    Vitamin D insufficiency    Ramírez's esophagus determined by biopsy    Weight gain, abnormal    Dysphagia     Past Medical History:   Diagnosis Date    Acid reflux     Allergy to cats     Arthritis     Asthma     Back pain     Breathing difficulty     approx July 2016 pt was having prostate surgery and surgery not completed as he developed "severe breathing problems"    Constipation     CPAP (continuous positive airway pressure) dependence     Diabetes mellitus (HCC)     HISTORY    Enlarged thyroid     Headache     High cholesterol     History of radiation therapy     last treatment 12/2016    Hypertension     Hypertriglyceridemia     Knee pain, bilateral     Memory loss     Muscle weakness     Obesity     Postgastrectomy malabsorption     Prostate cancer ([facility] [address] [facility]     Risk for falls     Seasonal allergies     Shortness of breath     Sleep apnea     cpap    Unsteady gait     "knees buckle"    Use of cane as ambulatory aid     Wears glasses      Social History     Socioeconomic History    Marital status:      Spouse name: Not on file    Number of children: Not on file    Years of education: Not on file    Highest education level: Not on file   Occupational History    Not on file   Social Needs    Financial resource strain: Not on file    Food insecurity:     Worry: Not on file     Inability: Not on file    Transportation needs:     Medical: Not on file     Non-medical: Not on file   Tobacco Use    Smoking status: Former Smoker     Packs/day: 0 00     Years: 0 00     Pack years: 0 00     Last attempt to quit:      Years since quittin 5    Smokeless tobacco: Never Used   Substance and Sexual Activity    Alcohol use: No    Drug use: No    Sexual activity: Not on file   Lifestyle    Physical activity:     Days per week: Not on file     Minutes per session: Not on file    Stress: Not on file   Relationships    Social connections:     Talks on phone: Not on file     Gets together: Not on file     Attends Congregation service: Not on file     Active member of club or organization: Not on file     Attends meetings of clubs or organizations: Not on file     Relationship status: Not on file    Intimate partner violence:     Fear of current or ex partner: Not on file     Emotionally abused: Not on file     Physically abused: Not on file     Forced sexual activity: Not on file   Other Topics Concern    Not on file   Social History Narrative    Not on file      Family History   Problem Relation Age of Onset    Arthritis Mother     Hypertension Mother     Prostate cancer Father      Past Surgical History:   Procedure Laterality Date    COLONOSCOPY      ESOPHAGOGASTRODUODENOSCOPY      ESOPHAGOGASTRODUODENOSCOPY N/A 2017    Procedure: ESOPHAGOGASTRODUODENOSCOPY (EGD);   Surgeon: Alyse Carballo MD;  Location: [facility];  Service: Bariatrics    NO PAST SURGERIES      AR LAP, ANNETTE RESTRICT PROC, LONGITUDINAL GASTRECTOMY N/A 2017    Procedure: GASTRECTOMY SLEEVE LAPAROSCOPIC;  Surgeon: Alyse Carballo MD; Location: [facility];  Service: Bariatrics    SLEEVE GASTROPLASTY      US GUIDED THYROID BIOPSY  1/31/2019       Current Outpatient Medications:     albuterol (PROAIR HFA) 90 mcg/act inhaler, Inhale, Disp: , Rfl:     Ascorbic Acid (VITAMIN C PO), Take 1 capsule by mouth 2 (two) times a day, Disp: , Rfl:     BREO ELLIPTA 200-25 MCG/INH inhaler, Inhale 1 puff daily, Disp: , Rfl: 1    CALCIUM CITRATE PO, Take by mouth, Disp: , Rfl:     Cyanocobalamin (Nascobal) 500 MCG/0 1ML SOLN, 0 1 mL (500 mcg total) into each nostril once a week Send Tablets for other Vitamins, Disp: 4 Bottle, Rfl: 12    docusate sodium (COLACE) 100 mg capsule, Take 100 mg by mouth every other day, Disp: , Rfl:     esomeprazole (NEXIUM) 40 mg packet, Take by mouth, Disp: , Rfl:     fenofibrate (TRICOR) 145 mg tablet, Take 145 mg by mouth daily  , Disp: , Rfl:     flunisolide (NASALIDE) 25 MCG/ACT (0 025%) SOLN, into each nostril, Disp: , Rfl:     fluticasone (FLOVENT HFA) 110 MCG/ACT inhaler, Inhale 2 puffs, Disp: , Rfl:     hydrochlorothiazide (HYDRODIURIL) 25 mg tablet, hydrochlorothiazide 25 mg tablet, Disp: , Rfl:     lisinopril (ZESTRIL) 20 mg tablet, Take 20 mg by mouth daily, Disp: , Rfl:     loratadine (CLARITIN) 10 mg tablet, daily, Disp: , Rfl:     montelukast (SINGULAIR) 10 mg tablet, Take 10 mg by mouth daily at bedtime  , Disp: , Rfl:     MULTIPLE VITAMIN PO, Take 1 tablet by mouth daily, Disp: , Rfl:     naproxen (NAPROSYN) 500 mg tablet, TAKE 1 TABLET BY MOUTH EVERY 12 HOURS WITH MEALS, Disp: , Rfl:     pantoprazole (PROTONIX) 40 mg tablet, Take 1 tablet (40 mg total) by mouth daily, Disp: 60 tablet, Rfl: 2    Potassium 99 MG TABS, Take 99 mg by mouth daily, Disp: , Rfl:     Sodium Hyaluronate (EUFLEXXA) 20 MG/2ML SOSY, 2 mL, Disp: , Rfl:     tamsulosin (FLOMAX) 0 4 mg, Take 1 capsule (0 4 mg total) by mouth 2 (two) times a day, Disp: 180 capsule, Rfl: 3    azithromycin (ZITHROMAX) 250 mg tablet, Take 2 tablets by mouth on day one; then one tablet daily for 4 days  , Disp: , Rfl:     diazepam (VALIUM) 10 mg tablet, Take 5 mg by mouth every 8 (eight) hours as needed, Disp: , Rfl:     levofloxacin (LEVAQUIN) 750 mg tablet, levofloxacin 750 mg tablet, Disp: , Rfl:     traMADol (ULTRAM) 50 mg tablet, tramadol 50 mg tablet, Disp: , Rfl:   Allergies   Allergen Reactions    Other     Pollen Extract      Vitals:    07/09/20 1024   BP: 108/82   BP Location: Left arm   Patient Position: Sitting   Cuff Size: Large   Pulse: 68   Temp: 98 8 °F (37 1 °C)   SpO2: 98%   Weight: 120 kg (265 lb)   Height: 5' 10" (1 778 m)         Labs:  Appointment on 06/17/2020   Component Date Value    Vit D, 25-Hydroxy 06/17/2020 53 7     WBC 06/17/2020 4 25*    RBC 06/17/2020 4 43     Hemoglobin 06/17/2020 12 9     Hematocrit 06/17/2020 40 2     MCV 06/17/2020 91     MCH 06/17/2020 29 1     MCHC 06/17/2020 32 1     RDW 06/17/2020 13 6     MPV 06/17/2020 11 2     Platelets 37/02/9118 151     nRBC 06/17/2020 0     Neutrophils Relative 06/17/2020 64     Immat GRANS % 06/17/2020 0     Lymphocytes Relative 06/17/2020 23     Monocytes Relative 06/17/2020 8     Eosinophils Relative 06/17/2020 4     Basophils Relative 06/17/2020 1     Neutrophils Absolute 06/17/2020 2 74     Immature Grans Absolute 06/17/2020 0 01     Lymphocytes Absolute 06/17/2020 0 97     Monocytes Absolute 06/17/2020 0 33     Eosinophils Absolute 06/17/2020 0 17     Basophils Absolute 06/17/2020 0 03     Sodium 06/17/2020 141     Potassium 06/17/2020 3 5     Chloride 06/17/2020 110*    CO2 06/17/2020 28     ANION GAP 06/17/2020 3*    BUN 06/17/2020 19     Creatinine 06/17/2020 1 11     Glucose, Fasting 06/17/2020 104*    Calcium 06/17/2020 9 6     AST 06/17/2020 13     ALT 06/17/2020 29     Alkaline Phosphatase 06/17/2020 32*    Total Protein 06/17/2020 8 0     Albumin 06/17/2020 4 4     Total Bilirubin 06/17/2020 0 55     eGFR 06/17/2020 87     Ferritin 06/17/2020 183     Folate 06/17/2020 >20 0*    Iron Saturation 06/17/2020 21     TIBC 06/17/2020 433     Iron 06/17/2020 90     Zinc 06/17/2020 94     Vitamin B-12 06/17/2020 863     Vitamin B1, Whole Blood 06/17/2020 153 7     Vitamin A 06/17/2020 48 6     PTH 06/17/2020 18 4    Appointment on 05/14/2020   Component Date Value    PSA, Diagnostic 05/14/2020 0 3      Imaging: No results found  Physical Exam:  General:  Obese awake, alert and oriented x3, not in distress, walks with cane  Neck:  no JVD  Eyes:  conjunctiva normal  Lungs:  Bilateral air entry positive, no wheeze/rhonchi or crackle  Heart:  S1-S2 normal, no murmur  Abdomen:  Soft ,nondistended ,nontender, bowel sounds positive  Extremities:  No leg edema, no deformity, ROM normal  Neuro:  Moving all extremities, speech clear  Skin: warm, no rash    /82 (BP Location: Left arm, Patient Position: Sitting, Cuff Size: Large)   Pulse 68   Temp 98 8 °F (37 1 °C)   Ht 5' 10" (1 778 m)   Wt 120 kg (265 lb)   SpO2 98%   BMI 38 02 kg/m²       Cardiographics :  ECG:  Sinus rhythm, left axis deviation      Assessment:    1  Chest pain  Intermediate probability of angina    2  Shortness of breath  3  Preoperative cardiac risk assessment for bariatric surgery  Patient has limited functional tolerance due to knee pain  4  Hypertension  Controlled  5  Hyperlipidemia  LDL at goal  6  Morbidly obese  7  GERD/Ramírez esophagus    Recommendations:  2D echocardiogram to evaluate for structural heart disease  Patient with cardiac risk factor including above-mentioned symptoms/ preoperative assessment will need a vessel dilator MPI stress test for further evaluation  Patient to continue current meds including lisinopril and fenofibrate  Preoperative cardiac risk assessment after above test results  Above discussed with patient  Patient understands and agrees     Addendum noted:  Echo and stress test  reviewed and discussed with patient  Patient denies chest pain, worsening shortness of breath or any new complains  His bp is <130/80 at home  Patient is at intermediate risk for perioopeative cardiac complication for bariatric revision surgery  No further work up is recommended at present time  Patient to follow up with me in 6 month  Above discussed with patient   Patient understands and agrees

## 2020-07-09 NOTE — PROGRESS NOTES
Assessment/Plan:    Hypertension  Taking lisinopril  -should improve with weight loss, dietary, and lifestyle changes  -continue management with prescribing provider      Postsurgical malabsorption  Vitamin labs done on 6/17/20    Ramírez's esophagus determined by biopsy  Taking nexium and protonix  -continue management with prescribing provider      Obesity, Class II, BMI 35-39 9  Interested in Revisional Surgery with Dr Barbara Lockwood  10% Weight loss-Not applicable   Screening labs-Not Completed-needs [id number] today  Support Group-Not Completed  Cardiac Risk Assessment-NOT Completed --has further testing on 8/3/20   Upper Endoscopy-Completed on 10/11/19 ; H  Pylori biopsy-negative   Sleep Medicine Assessment-Not Completed  Alcohol and nicotine use is not recommended following bariatric surgery  NSAIDs should be avoided following bariatric surgery        Follow up in approximately 1 month with Non-Surgical Physician/Advanced Practitioner  Diagnoses and all orders for this visit:    Obesity, Class II, BMI 35-39 9  -     Hemoglobin A1C; Future    Hypertension  -     Hemoglobin A1C; Future    Postsurgical malabsorption  -     Hemoglobin A1C; Future    Ramírez's esophagus determined by biopsy  -     Hemoglobin A1C; Future    History of diabetes mellitus  -     Hemoglobin A1C; Future          Subjective:   Chief Complaint   Patient presents with    Weight Check     Patient is here to see PA for one of three weight checks with end goal of bariatric revision surgery  Patient ID: Ángel Chapa  is a 49 y o  male with excess weight/obesity here to pursue weight managment  Patient is pursuing Revisional Surgery       HPI    The patient has been:  Following the lessons in the manual- yes  Practicing the 30/60 minute rule- yes  Food logging- no-being mindful and avoiding SF drinks   Exercise- very little due to knee and back pain     The following portions of the patient's history were reviewed and updated as appropriate: allergies, current medications, past family history, past medical history, past social history, past surgical history and problem list     Review of Systems   HENT: Negative for sore throat  Respiratory: Negative for cough and shortness of breath  Cardiovascular: Negative for chest pain and palpitations  Gastrointestinal: Positive for constipation  Negative for abdominal pain, diarrhea, nausea and vomiting         + GERD--somewhat controlled    Skin: Negative for rash  Psychiatric/Behavioral: Negative for suicidal ideas  Denies depression and anxiety       Objective:    /78 (BP Location: Left arm, Patient Position: Sitting, Cuff Size: Large)   Pulse 73   Temp 98 1 °F (36 7 °C) (Tympanic)   Ht 5' 10" (1 778 m)   Wt 121 kg (267 lb 9 6 oz)   BMI 38 40 kg/m²      Physical Exam   Nursing note and vitals reviewed  Constitutional   General appearance: Abnormal   well developed and morbidly obese  Eyes No conjunctival pallor  Pulmonary   Respiratory effort: No increased work of breathing or signs of respiratory distress  Auscultation of lungs: Clear to auscultation, equal breath sounds bilaterally, no wheezes, no rales, no rhonci  Cardiovascular   Auscultation of heart: Normal rate and rhythm, normal S1 and S2, without murmurs  Examination of extremities for edema and/or varicosities: Normal   no edema  Abdomen   Abdomen: Abnormal   The abdomen was obese  Bowel sounds were normal  The abdomen was soft and nontender     Musculoskeletal   Gait and station: Normal     Psychiatric   Orientation to person, place and time: Normal     Affect: appropriate

## 2020-07-09 NOTE — ASSESSMENT & PLAN NOTE
Interested in Revisional Surgery with Dr Ximena Ballard    10% Weight loss-Not applicable   Screening labs-Not Completed-needs [id number] today  Support Group-Not Completed  Cardiac Risk Assessment-NOT Completed --has further testing on 8/3/20   Upper Endoscopy-Completed on 10/11/19 ; H  Pylori biopsy-negative   Sleep Medicine Assessment-Not Completed  Alcohol and nicotine use is not recommended following bariatric surgery  NSAIDs should be avoided following bariatric surgery

## 2020-07-13 ENCOUNTER — APPOINTMENT (OUTPATIENT)
Dept: LAB | Facility: CLINIC | Age: 55
End: 2020-07-13
Payer: COMMERCIAL

## 2020-07-13 ENCOUNTER — TRANSCRIBE ORDERS (OUTPATIENT)
Dept: LAB | Facility: CLINIC | Age: 55
End: 2020-07-13

## 2020-07-13 DIAGNOSIS — E66.9 OBESITY, CLASS II, BMI 35-39.9: ICD-10-CM

## 2020-07-13 DIAGNOSIS — K91.2 POSTSURGICAL MALABSORPTION: ICD-10-CM

## 2020-07-13 DIAGNOSIS — I10 ESSENTIAL HYPERTENSION, MALIGNANT: ICD-10-CM

## 2020-07-13 DIAGNOSIS — E11.9 TYPE 2 DIABETES MELLITUS WITHOUT COMPLICATION, UNSPECIFIED WHETHER LONG TERM INSULIN USE (HCC): Primary | ICD-10-CM

## 2020-07-13 DIAGNOSIS — E11.9 TYPE 2 DIABETES MELLITUS WITHOUT COMPLICATION, UNSPECIFIED WHETHER LONG TERM INSULIN USE (HCC): ICD-10-CM

## 2020-07-13 DIAGNOSIS — E78.00 PURE HYPERCHOLESTEROLEMIA: ICD-10-CM

## 2020-07-13 DIAGNOSIS — Z86.39 HISTORY OF DIABETES MELLITUS: ICD-10-CM

## 2020-07-13 DIAGNOSIS — I10 HYPERTENSION: ICD-10-CM

## 2020-07-13 DIAGNOSIS — K22.70 BARRETT'S ESOPHAGUS DETERMINED BY BIOPSY: ICD-10-CM

## 2020-07-13 LAB
ALT SERPL W P-5'-P-CCNC: 26 U/L (ref 12–78)
ANION GAP SERPL CALCULATED.3IONS-SCNC: 4 MMOL/L (ref 4–13)
AST SERPL W P-5'-P-CCNC: 16 U/L (ref 5–45)
BASOPHILS # BLD AUTO: 0.03 THOUSANDS/ΜL (ref 0–0.1)
BASOPHILS NFR BLD AUTO: 1 % (ref 0–1)
BUN SERPL-MCNC: 16 MG/DL (ref 5–25)
CALCIUM SERPL-MCNC: 9.2 MG/DL (ref 8.3–10.1)
CHLORIDE SERPL-SCNC: 109 MMOL/L (ref 100–108)
CHOLEST SERPL-MCNC: 151 MG/DL (ref 50–200)
CO2 SERPL-SCNC: 31 MMOL/L (ref 21–32)
CREAT SERPL-MCNC: 1.14 MG/DL (ref 0.6–1.3)
CREAT UR-MCNC: 220 MG/DL
EOSINOPHIL # BLD AUTO: 0.15 THOUSAND/ΜL (ref 0–0.61)
EOSINOPHIL NFR BLD AUTO: 4 % (ref 0–6)
ERYTHROCYTE [DISTWIDTH] IN BLOOD BY AUTOMATED COUNT: 13.2 % (ref 11.6–15.1)
EST. AVERAGE GLUCOSE BLD GHB EST-MCNC: 114 MG/DL
GFR SERPL CREATININE-BSD FRML MDRD: 84 ML/MIN/1.73SQ M
GLUCOSE P FAST SERPL-MCNC: 102 MG/DL (ref 65–99)
HBA1C MFR BLD: 5.6 %
HCT VFR BLD AUTO: 40.4 % (ref 36.5–49.3)
HDLC SERPL-MCNC: 40 MG/DL
HGB BLD-MCNC: 12.9 G/DL (ref 12–17)
IMM GRANULOCYTES # BLD AUTO: 0 THOUSAND/UL (ref 0–0.2)
IMM GRANULOCYTES NFR BLD AUTO: 0 % (ref 0–2)
LDLC SERPL CALC-MCNC: 92 MG/DL (ref 0–100)
LYMPHOCYTES # BLD AUTO: 1.04 THOUSANDS/ΜL (ref 0.6–4.47)
LYMPHOCYTES NFR BLD AUTO: 31 % (ref 14–44)
MCH RBC QN AUTO: 29.1 PG (ref 26.8–34.3)
MCHC RBC AUTO-ENTMCNC: 31.9 G/DL (ref 31.4–37.4)
MCV RBC AUTO: 91 FL (ref 82–98)
MICROALBUMIN UR-MCNC: 26.4 MG/L (ref 0–20)
MICROALBUMIN/CREAT 24H UR: 12 MG/G CREATININE (ref 0–30)
MONOCYTES # BLD AUTO: 0.29 THOUSAND/ΜL (ref 0.17–1.22)
MONOCYTES NFR BLD AUTO: 9 % (ref 4–12)
NEUTROPHILS # BLD AUTO: 1.88 THOUSANDS/ΜL (ref 1.85–7.62)
NEUTS SEG NFR BLD AUTO: 55 % (ref 43–75)
NONHDLC SERPL-MCNC: 111 MG/DL
NRBC BLD AUTO-RTO: 0 /100 WBCS
PLATELET # BLD AUTO: 156 THOUSANDS/UL (ref 149–390)
PMV BLD AUTO: 11.5 FL (ref 8.9–12.7)
POTASSIUM SERPL-SCNC: 3.5 MMOL/L (ref 3.5–5.3)
RBC # BLD AUTO: 4.44 MILLION/UL (ref 3.88–5.62)
SODIUM SERPL-SCNC: 144 MMOL/L (ref 136–145)
TRIGL SERPL-MCNC: 95 MG/DL
TSH SERPL DL<=0.05 MIU/L-ACNC: 0.47 UIU/ML (ref 0.36–3.74)
WBC # BLD AUTO: 3.39 THOUSAND/UL (ref 4.31–10.16)

## 2020-07-13 PROCEDURE — 85025 COMPLETE CBC W/AUTO DIFF WBC: CPT

## 2020-07-13 PROCEDURE — 80048 BASIC METABOLIC PNL TOTAL CA: CPT

## 2020-07-13 PROCEDURE — 36415 COLL VENOUS BLD VENIPUNCTURE: CPT

## 2020-07-13 PROCEDURE — 82570 ASSAY OF URINE CREATININE: CPT | Performed by: FAMILY MEDICINE

## 2020-07-13 PROCEDURE — 84450 TRANSFERASE (AST) (SGOT): CPT

## 2020-07-13 PROCEDURE — 84443 ASSAY THYROID STIM HORMONE: CPT

## 2020-07-13 PROCEDURE — 82043 UR ALBUMIN QUANTITATIVE: CPT | Performed by: FAMILY MEDICINE

## 2020-07-13 PROCEDURE — 80061 LIPID PANEL: CPT

## 2020-07-13 PROCEDURE — 83036 HEMOGLOBIN GLYCOSYLATED A1C: CPT

## 2020-07-13 PROCEDURE — 84460 ALANINE AMINO (ALT) (SGPT): CPT

## 2020-07-14 ENCOUNTER — TELEPHONE (OUTPATIENT)
Dept: BARIATRICS | Facility: CLINIC | Age: 55
End: 2020-07-14

## 2020-07-14 NOTE — TELEPHONE ENCOUNTER
----- Message from Katie Floyd PA-C sent at 7/13/2020  3:47 PM EDT -----  a1c within acceptable range

## 2020-07-24 ENCOUNTER — CONSULT (OUTPATIENT)
Dept: PULMONOLOGY | Facility: CLINIC | Age: 55
End: 2020-07-24
Payer: COMMERCIAL

## 2020-07-24 VITALS
OXYGEN SATURATION: 98 % | HEART RATE: 82 BPM | TEMPERATURE: 98.2 F | SYSTOLIC BLOOD PRESSURE: 112 MMHG | WEIGHT: 266 LBS | HEIGHT: 70 IN | BODY MASS INDEX: 38.08 KG/M2 | DIASTOLIC BLOOD PRESSURE: 72 MMHG

## 2020-07-24 DIAGNOSIS — I10 ESSENTIAL HYPERTENSION: ICD-10-CM

## 2020-07-24 DIAGNOSIS — E66.9 OBESITY, CLASS II, BMI 35-39.9: ICD-10-CM

## 2020-07-24 DIAGNOSIS — G47.33 OSA (OBSTRUCTIVE SLEEP APNEA): Primary | ICD-10-CM

## 2020-07-24 DIAGNOSIS — J45.40 MODERATE PERSISTENT ASTHMA WITHOUT COMPLICATION: ICD-10-CM

## 2020-07-24 DIAGNOSIS — Z87.891 FORMER SMOKER: ICD-10-CM

## 2020-07-24 PROCEDURE — 99204 OFFICE O/P NEW MOD 45 MIN: CPT | Performed by: PHYSICIAN ASSISTANT

## 2020-07-24 RX ORDER — ALBUTEROL SULFATE 90 UG/1
1-2 AEROSOL, METERED RESPIRATORY (INHALATION) EVERY 6 HOURS PRN
Qty: 18 G | Refills: 3 | Status: SHIPPED | OUTPATIENT
Start: 2020-07-24 | End: 2020-12-03 | Stop reason: ALTCHOICE

## 2020-07-24 NOTE — PROGRESS NOTES
Assessment:    1  BARRETT (obstructive sleep apnea)  Diagnostic Sleep Study   2  Obesity, Class II, BMI 35-39 9     3  Essential hypertension     4  Moderate persistent asthma without complication  albuterol (Ventolin HFA) 90 mcg/act inhaler   5  Former smoker         Plan:   Obstructive Sleep Apnea Etiology pathogenesis discussed with the patient in detail  Patient has signs and symptoms of obstructive sleep apnea  He will undergo an all night polysomnogram and if there is evidence of abnormal nocturnal breathing he will undergo a CPAP titration study for maximal benefit  Consequences of untreated sleep apnea including excessive daytime sleepiness and increased risk for myocardial infarction stroke atrial fibrillation discussed with the patient  Testing procedure was discussed in detail  Cautioned against driving when sleepy  Recommend weight loss  Follow-up after the above testing  Comorbidities include obesity and hypertension  Patient is following with bariatric clinic for revision surgery  Asthma currently well-controlled on Breo, Flovent, Singulair, albuterol p r n  Would be a candidate for annual CT lung screening once he reaches the age of 55years old    All of the patient's questions were answered to their satisfaction and understanding, which was verbalized  Patient was advised to call the office prior to next appointment should they have any questions or concerns prior  Patient made aware of worrisome symptoms that should prompt a visit to the ER or call to 911 such as chest pain, severe shortness of breath, cyanosis, throat closing, syncope, etc     Subjective:     Patient ID: Vito Guerrero is a 49 y o  male  Chief Complaint:  Rizwana Maxwell is a very pleasant 78-year-old male former smoker with past medical history including BARRETT, asthma, allergies, hypertension, hyperlipidemia, diabetes presenting to [facility] care    Patient was previously diagnosed with BARRETT about 10 years ago, he discontinued use of the CPAP after undergoing a gastric sleeve surgery many years ago  He has gained the weight back over the last few years and is now following with bariatric clinic and planning for revision surgery  He notes after gaining the weight back, he is again experiencing symptoms of sleep apnea including waking up gasping for air and loud snoring  He is also excessively sleepy during the day and falls asleep in public places sometimes  He does not have any trouble staying awake while driving  He also has asthma which is well maintained on maximal inhaled corticosteroid therapy  He states in the past, he used to frequently goes to the ED for asthma exacerbations  He quit smoking about 11 years ago and smoked 1 pack per day for about 30 years  He is a retired educator  The following portions of the patient's history were reviewed in this encounter and updated as appropriate: Past medical, social, surgical, family, allergies    Review of Systems   Constitutional: Positive for fatigue  Negative for chills and fever  Respiratory: Positive for cough and chest tightness  Negative for shortness of breath  Psychiatric/Behavioral: Positive for sleep disturbance  All other systems reviewed and are negative  Objective:  Vitals:    07/24/20 1438   BP: 112/72   Pulse: 82   Temp: 98 2 °F (36 8 °C)   SpO2: 98%   Weight: 121 kg (266 lb)   Height: 5' 10" (1 778 m)       Physical Exam   Constitutional: He is oriented to person, place, and time  He appears well-developed and well-nourished  No distress  Obese   HENT:   Head: Normocephalic and atraumatic  Right Ear: External ear normal    Left Ear: External ear normal    Eyes: Conjunctivae and EOM are normal  Right eye exhibits no discharge  Left eye exhibits no discharge  No scleral icterus  Neck: Normal range of motion  Neck supple  No tracheal deviation present  Short and wide neck   Cardiovascular: Normal rate, regular rhythm and normal heart sounds   Exam reveals no gallop and no friction rub  No murmur heard  Pulmonary/Chest: Effort normal and breath sounds normal  No stridor  No respiratory distress  He has no wheezes  Abdominal: There is no guarding  Musculoskeletal: Normal range of motion  He exhibits no edema, tenderness or deformity  Neurological: He is alert and oriented to person, place, and time  No cranial nerve deficit  He exhibits normal muscle tone  Skin: Skin is warm and dry  No rash noted  He is not diaphoretic  No erythema  No pallor  Psychiatric: He has a normal mood and affect  His behavior is normal  Judgment and thought content normal    Nursing note and vitals reviewed        Lab Review:   Appointment on 07/13/2020   Component Date Value    Hemoglobin A1C 07/13/2020 5 6     EAG 07/13/2020 114     WBC 07/13/2020 3 39*    RBC 07/13/2020 4 44     Hemoglobin 07/13/2020 12 9     Hematocrit 07/13/2020 40 4     MCV 07/13/2020 91     MCH 07/13/2020 29 1     MCHC 07/13/2020 31 9     RDW 07/13/2020 13 2     MPV 07/13/2020 11 5     Platelets 81/02/1387 156     nRBC 07/13/2020 0     Neutrophils Relative 07/13/2020 55     Immat GRANS % 07/13/2020 0     Lymphocytes Relative 07/13/2020 31     Monocytes Relative 07/13/2020 9     Eosinophils Relative 07/13/2020 4     Basophils Relative 07/13/2020 1     Neutrophils Absolute 07/13/2020 1 88     Immature Grans Absolute 07/13/2020 0 00     Lymphocytes Absolute 07/13/2020 1 04     Monocytes Absolute 07/13/2020 0 29     Eosinophils Absolute 07/13/2020 0 15     Basophils Absolute 07/13/2020 0 03     Sodium 07/13/2020 144     Potassium 07/13/2020 3 5     Chloride 07/13/2020 109*    CO2 07/13/2020 31     ANION GAP 07/13/2020 4     BUN 07/13/2020 16     Creatinine 07/13/2020 1 14     Glucose, Fasting 07/13/2020 102*    Calcium 07/13/2020 9 2     eGFR 07/13/2020 84     AST 07/13/2020 16     ALT 07/13/2020 26     Cholesterol 07/13/2020 151     Triglycerides 07/13/2020 95  HDL, Direct 07/13/2020 40     LDL Calculated 07/13/2020 92     Non-HDL-Chol (CHOL-HDL) 07/13/2020 111     TSH 3RD GENERATON 07/13/2020 0 472    Transcribe Orders on 07/13/2020   Component Date Value    Creatinine, Ur 07/13/2020 220 0     Microalbum  ,U,Random 07/13/2020 26 4*    Microalb Creat Ratio 07/13/2020 12    Appointment on 06/17/2020   Component Date Value    Vit D, 25-Hydroxy 06/17/2020 53 7     WBC 06/17/2020 4 25*    RBC 06/17/2020 4 43     Hemoglobin 06/17/2020 12 9     Hematocrit 06/17/2020 40 2     MCV 06/17/2020 91     MCH 06/17/2020 29 1     MCHC 06/17/2020 32 1     RDW 06/17/2020 13 6     MPV 06/17/2020 11 2     Platelets 32/38/0987 151     nRBC 06/17/2020 0     Neutrophils Relative 06/17/2020 64     Immat GRANS % 06/17/2020 0     Lymphocytes Relative 06/17/2020 23     Monocytes Relative 06/17/2020 8     Eosinophils Relative 06/17/2020 4     Basophils Relative 06/17/2020 1     Neutrophils Absolute 06/17/2020 2 74     Immature Grans Absolute 06/17/2020 0 01     Lymphocytes Absolute 06/17/2020 0 97     Monocytes Absolute 06/17/2020 0 33     Eosinophils Absolute 06/17/2020 0 17     Basophils Absolute 06/17/2020 0 03     Sodium 06/17/2020 141     Potassium 06/17/2020 3 5     Chloride 06/17/2020 110*    CO2 06/17/2020 28     ANION GAP 06/17/2020 3*    BUN 06/17/2020 19     Creatinine 06/17/2020 1 11     Glucose, Fasting 06/17/2020 104*    Calcium 06/17/2020 9 6     AST 06/17/2020 13     ALT 06/17/2020 29     Alkaline Phosphatase 06/17/2020 32*    Total Protein 06/17/2020 8 0     Albumin 06/17/2020 4 4     Total Bilirubin 06/17/2020 0 55     eGFR 06/17/2020 87     Ferritin 06/17/2020 183     Folate 06/17/2020 >20 0*    Iron Saturation 06/17/2020 21     TIBC 06/17/2020 433     Iron 06/17/2020 90     Zinc 06/17/2020 94     Vitamin B-12 06/17/2020 863     Vitamin B1, Whole Blood 06/17/2020 153 7     Vitamin A 06/17/2020 48 6     PTH 06/17/2020 18 4

## 2020-08-03 ENCOUNTER — HOSPITAL ENCOUNTER (OUTPATIENT)
Dept: NON INVASIVE DIAGNOSTICS | Facility: CLINIC | Age: 55
Discharge: HOME | End: 2020-08-03
Payer: COMMERCIAL

## 2020-08-03 DIAGNOSIS — E78.2 MIXED HYPERLIPIDEMIA: ICD-10-CM

## 2020-08-03 DIAGNOSIS — R07.2 PRECORDIAL CHEST PAIN: ICD-10-CM

## 2020-08-03 DIAGNOSIS — R06.00 DYSPNEA, UNSPECIFIED TYPE: ICD-10-CM

## 2020-08-03 DIAGNOSIS — Z01.818 PREOPERATIVE CLEARANCE: ICD-10-CM

## 2020-08-03 DIAGNOSIS — I10 ESSENTIAL HYPERTENSION: ICD-10-CM

## 2020-08-03 PROCEDURE — A9502 TC99M TETROFOSMIN: HCPCS

## 2020-08-03 PROCEDURE — 93016 CV STRESS TEST SUPVJ ONLY: CPT | Performed by: INTERNAL MEDICINE

## 2020-08-03 PROCEDURE — 93306 TTE W/DOPPLER COMPLETE: CPT | Performed by: INTERNAL MEDICINE

## 2020-08-03 PROCEDURE — 78452 HT MUSCLE IMAGE SPECT MULT: CPT | Performed by: INTERNAL MEDICINE

## 2020-08-03 PROCEDURE — 93017 CV STRESS TEST TRACING ONLY: CPT

## 2020-08-03 PROCEDURE — 78452 HT MUSCLE IMAGE SPECT MULT: CPT

## 2020-08-03 PROCEDURE — 93306 TTE W/DOPPLER COMPLETE: CPT

## 2020-08-03 PROCEDURE — 93018 CV STRESS TEST I&R ONLY: CPT | Performed by: INTERNAL MEDICINE

## 2020-08-03 RX ADMIN — REGADENOSON 0.4 MG: 0.08 INJECTION, SOLUTION INTRAVENOUS at 08:47

## 2020-08-06 ENCOUNTER — TELEPHONE (OUTPATIENT)
Dept: BARIATRICS | Facility: CLINIC | Age: 55
End: 2020-08-06

## 2020-08-06 NOTE — TELEPHONE ENCOUNTER
COVID Pre-Visit Screening     1  Is this a family member screening? no  2  Have you traveled outside of your state in the past 2 weeks? no  3  Do you presently have a fever or flu-like symptoms?no  4  Do you have symptoms of an upper respiratory infection like runny nose, sore throat, or cough? no  5  Are you suffering from new headache that you have not had in the past?  no  6  Do you have/have you experienced any new shortness of breath recently? no  7  Do you have any new diarrhea, nausea or vomiting? no  8  Have you been in contact with anyone who has been sick or diagnosed with COVID-19? no  9  Do you have any new loss of taste or smell? no  10  Are you able to wear a mask without a valve for the entire visit?  yes

## 2020-08-07 ENCOUNTER — OFFICE VISIT (OUTPATIENT)
Dept: BARIATRICS | Facility: CLINIC | Age: 55
End: 2020-08-07
Payer: COMMERCIAL

## 2020-08-07 VITALS
WEIGHT: 264.7 LBS | HEART RATE: 67 BPM | BODY MASS INDEX: 37.9 KG/M2 | DIASTOLIC BLOOD PRESSURE: 82 MMHG | TEMPERATURE: 98.7 F | HEIGHT: 70 IN | SYSTOLIC BLOOD PRESSURE: 130 MMHG

## 2020-08-07 DIAGNOSIS — K22.70 BARRETT'S ESOPHAGUS DETERMINED BY BIOPSY: ICD-10-CM

## 2020-08-07 DIAGNOSIS — Z12.11 COLON CANCER SCREENING: ICD-10-CM

## 2020-08-07 DIAGNOSIS — K91.2 POSTSURGICAL MALABSORPTION: ICD-10-CM

## 2020-08-07 DIAGNOSIS — E66.9 OBESITY, CLASS II, BMI 35-39.9: Primary | ICD-10-CM

## 2020-08-07 DIAGNOSIS — I10 HYPERTENSION: ICD-10-CM

## 2020-08-07 PROCEDURE — 99214 OFFICE O/P EST MOD 30 MIN: CPT | Performed by: PHYSICIAN ASSISTANT

## 2020-08-07 NOTE — PROGRESS NOTES
Assessment/Plan:    Obesity, Class II, BMI 35-39 9  Interested in Revisional Surgery with Dr Anthony Haq  10% Weight loss-Not applicable   Screening labs-labs within acceptable limits for surgery   Support Group-Not Completed--link was emailed to patient   Cardiac Risk Assessment-will message cards to see if patient is cleared for surgery   Upper Endoscopy-Completed on 10/11/19 ; H  Pylori biopsy-negative   Sleep Medicine Assessment-Not Completed scheduled on 9/23/20  Alcohol and nicotine use is not recommended following bariatric surgery  NSAIDs should be avoided following bariatric surgery    Do support group, continue to food log and 30/60  Try and walk more  I have sent staff message to cardiologist for clearance  Ramírez's esophagus determined by biopsy  Taking nexium and protonix  -continue management with prescribing provider      Postsurgical malabsorption  Vitamin labs done on 6/17/20    Hypertension  Taking lisinopril  -should improve with weight loss, dietary, and lifestyle changes  -continue management with prescribing provider      Follow up in approximately 1 month with Non-Surgical Physician/Advanced Practitioner  Diagnoses and all orders for this visit:    Obesity, Class II, BMI 35-39 9    Ramírez's esophagus determined by biopsy    Postsurgical malabsorption    Hypertension    Colon cancer screening  -     Ambulatory referral to Gastroenterology; Future          Subjective:   Chief Complaint   Patient presents with    Weight Check     Patient is here for two of three weight checks with PA  Patient ID: Litzy Both  is a 49 y o  male with excess weight/obesity here to pursue weight managment  Patient is pursuing Revisional Surgery       HPI    The patient has been:  Following the lessons in the manual- yes  Practicing the 30/60 minute rule- yes  Food logging- yes intermittently   Exercise- walking more     Colonoscopy-Not Completed--gi referral placed     The following portions of the patient's history were reviewed and updated as appropriate: allergies, current medications, past family history, past medical history, past social history, past surgical history and problem list     Review of Systems   HENT: Negative for sore throat  Respiratory: Negative for cough and shortness of breath  Cardiovascular: Negative for chest pain and palpitations  Gastrointestinal: Positive for constipation  Negative for abdominal pain, diarrhea, nausea and vomiting         + GERD--controlled with meds    Skin: Negative for rash  Neurological: Negative for headaches  Psychiatric/Behavioral: Negative for suicidal ideas  Denies depression and anxiety       Objective:    /82 (BP Location: Left arm, Patient Position: Sitting, Cuff Size: Large)   Pulse 67   Temp 98 7 °F (37 1 °C) (Tympanic)   Ht 5' 10" (1 778 m)   Wt 120 kg (264 lb 11 2 oz)   BMI 37 98 kg/m²      Physical Exam  Vitals signs and nursing note reviewed  Constitutional   General appearance: Abnormal   well developed and morbidly obese  Eyes No conjunctival pallor  Pulmonary   Respiratory effort: No increased work of breathing or signs of respiratory distress  Auscultation of lungs: Clear to auscultation, equal breath sounds bilaterally, no wheezes, no rales, no rhonci  Cardiovascular   Auscultation of heart: Normal rate and rhythm, normal S1 and S2, without murmurs  Examination of extremities for edema and/or varicosities: Normal   no edema  Abdomen   Abdomen: Abnormal   The abdomen was obese  Bowel sounds were normal  The abdomen was soft and nontender     Musculoskeletal   Gait and station: Normal     Psychiatric   Orientation to person, place and time: Normal     Affect: appropriate

## 2020-08-07 NOTE — ASSESSMENT & PLAN NOTE
Interested in Revisional Surgery with Dr Devonte Martinez  10% Weight loss-Not applicable   Screening labs-labs within acceptable limits for surgery   Support Group-Not Completed--link was emailed to patient   Cardiac Risk Assessment-will message cards to see if patient is cleared for surgery   Upper Endoscopy-Completed on 10/11/19 ; H  Pylori biopsy-negative   Sleep Medicine Assessment-Not Completed scheduled on 9/23/20  Alcohol and nicotine use is not recommended following bariatric surgery  NSAIDs should be avoided following bariatric surgery    Do support group, continue to food log and 30/60  Try and walk more  I have sent staff message to cardiologist for clearance

## 2020-08-10 ENCOUNTER — TELEPHONE (OUTPATIENT)
Dept: CARDIOLOGY CLINIC | Facility: CLINIC | Age: 55
End: 2020-08-10

## 2020-08-10 LAB
MAX DIASTOLIC BP: 76 MMHG
MAX HEART RATE: 90 BPM
MAX PREDICTED HEART RATE: 167 BPM
MAX. SYSTOLIC BP: 102 MMHG
PROTOCOL NAME: NORMAL
REASON FOR TERMINATION: NORMAL
TARGET HR FORMULA: NORMAL
TIME IN EXERCISE PHASE: NORMAL

## 2020-08-10 NOTE — TELEPHONE ENCOUNTER
Sowmya from (Weight Management) called and would like to know if pt is cleared for surgery  If so put an addendum in chart

## 2020-08-10 NOTE — TELEPHONE ENCOUNTER
Dr Saravia Meals patient   Echo and stress test completed on 8/3/2020  Please review and advise, thanks

## 2020-08-11 NOTE — TELEPHONE ENCOUNTER
Left voicemail message for Carlos to call back with surgery date  Spoke with patient and he said they are looking for end of September/beginning of October

## 2020-08-11 NOTE — TELEPHONE ENCOUNTER
Please check the date of surgery    If it is sometime late August or in September, this can wait for Saida Friend

## 2020-08-19 NOTE — TELEPHONE ENCOUNTER
Office note faxed to Dr Cynthia Patel  Please add patient to recall list for a follow-up appointment  Thanks

## 2020-08-19 NOTE — TELEPHONE ENCOUNTER
I spoke to patient and I addendum my initial clinic visit note for risk stratification  Patient to have follow up in 6 month

## 2020-09-10 ENCOUNTER — TELEPHONE (OUTPATIENT)
Dept: BARIATRICS | Facility: CLINIC | Age: 55
End: 2020-09-10

## 2020-09-10 NOTE — TELEPHONE ENCOUNTER
[facility] COVID Pre-Visit Screening     1  Is this a family member screening? no  2  Have you traveled outside of your state in the past 2 weeks? no  3  Do you presently have a fever or flu-like symptoms? no  4  Do you have symptoms of an upper respiratory infection like runny nose, sore throat, or cough? no  5  Are you suffering from new headache that you have not had in the past?  no  6  Do you have/have you experienced any new shortness of breath recently? no  7  Do you have any new diarrhea, nausea or vomiting? no  8  Have you been in contact with anyone who has been sick or diagnosed with COVID-19? no  9  Do you have any new loss of taste or smell? no  10  Are you able to wear a mask without a valve for the entire visit?  yes

## 2020-09-17 ENCOUNTER — TELEPHONE (OUTPATIENT)
Dept: BARIATRICS | Facility: CLINIC | Age: 55
End: 2020-09-17

## 2020-09-17 NOTE — ASSESSMENT & PLAN NOTE
Interested in Revisional Surgery with Dr Anthony Haq  10% Weight loss-Not applicable   Screening labs-labs within acceptable limits for surgery   Support Group Completed  Cardiac Risk Assessment-Completed   Upper Endoscopy-Completed on 10/11/19 ; H  Pylori biopsy-negative   Sleep Medicine Assessment-Not Completed scheduled on 9/23/20  Alcohol and nicotine use is not recommended following bariatric surgery  NSAIDs should be avoided following bariatric surgery    F/u with Dr Anthony Haq, continue to food log and 30/60  Try and walk more

## 2020-09-18 ENCOUNTER — OFFICE VISIT (OUTPATIENT)
Dept: BARIATRICS | Facility: CLINIC | Age: 55
End: 2020-09-18
Payer: COMMERCIAL

## 2020-09-18 VITALS
WEIGHT: 264.6 LBS | HEART RATE: 83 BPM | HEIGHT: 70 IN | SYSTOLIC BLOOD PRESSURE: 132 MMHG | DIASTOLIC BLOOD PRESSURE: 80 MMHG | TEMPERATURE: 97.9 F | RESPIRATION RATE: 18 BRPM | BODY MASS INDEX: 37.88 KG/M2

## 2020-09-18 DIAGNOSIS — E66.9 OBESITY, CLASS II, BMI 35-39.9: Primary | ICD-10-CM

## 2020-09-18 DIAGNOSIS — K91.2 POSTSURGICAL MALABSORPTION: ICD-10-CM

## 2020-09-18 DIAGNOSIS — K22.70 BARRETT'S ESOPHAGUS DETERMINED BY BIOPSY: ICD-10-CM

## 2020-09-18 DIAGNOSIS — I10 HYPERTENSION: ICD-10-CM

## 2020-09-18 PROCEDURE — 99214 OFFICE O/P EST MOD 30 MIN: CPT | Performed by: PHYSICIAN ASSISTANT

## 2020-09-18 NOTE — PROGRESS NOTES
Assessment/Plan:    Obesity, Class II, BMI 35-39 9  Interested in Revisional Surgery with Dr Todd Felix  10% Weight loss-Not applicable   Screening labs-labs within acceptable limits for surgery   Support Group Completed  Cardiac Risk Assessment-Completed   Upper Endoscopy-Completed on 10/11/19 ; H  Pylori biopsy-negative   Sleep Medicine Assessment-Not Completed scheduled on 9/23/20  Alcohol and nicotine use is not recommended following bariatric surgery  NSAIDs should be avoided following bariatric surgery    F/u with Dr Todd Felix, continue to food log and 30/60  Try and walk more  Postsurgical malabsorption  Vitamin labs done on 6/17/20    Ramírez's esophagus determined by biopsy  Taking nexium and protonix  -continue management with prescribing provider      Hypertension  Taking lisinopril  -should improve with weight loss, dietary, and lifestyle changes  -continue management with prescribing provider      Follow up in approximately 2 weeks with Bariatric Surgeon  Diagnoses and all orders for this visit:    Obesity, Class II, BMI 35-39 9    Postsurgical malabsorption    Ramírez's esophagus determined by biopsy    Hypertension    Other orders  -     Influenza Vac Split Quad (AFLURIA QUADRIVALENT IM); Afluria Qd 2020-21 (36 mos up)(PF)60 mcg (15 mcg x4)/0 5 mL IM syringe          Subjective:   Chief Complaint   Patient presents with    Weight Check     3/3 revision wt         Patient ID: Charles Wei  is a 48 y o  male with excess weight/obesity here to pursue weight managment  Patient is pursuing Revisional Surgery       HPI    The patient has been:  Following the lessons in the manual- yes  Practicing the 30/60 minute rule- yes  Food logging- no-not consistent   Exercise- yes--treadmill     Colonoscopy-Not Completed--referral placed at last visit     The following portions of the patient's history were reviewed and updated as appropriate: allergies, current medications, past family history, past medical history, past social history, past surgical history and problem list     Review of Systems   HENT: Negative for sore throat  Respiratory: Negative for cough and shortness of breath  Cardiovascular: Negative for chest pain and palpitations  Gastrointestinal: Positive for constipation (taking colace )  Negative for abdominal pain, diarrhea, nausea and vomiting         + GERD--controlled with meds    Skin: Negative for rash  Psychiatric/Behavioral: Negative for suicidal ideas  Denies depression and anxiety       Objective:    /80 (BP Location: Left arm, Patient Position: Sitting, Cuff Size: Large)   Pulse 83   Temp 97 9 °F (36 6 °C) (Tympanic)   Resp 18   Ht 5' 10" (1 778 m)   Wt 120 kg (264 lb 9 6 oz)   BMI 37 97 kg/m²      Physical Exam  Vitals signs and nursing note reviewed  Constitutional   General appearance: Abnormal   well developed and morbidly obese  Eyes No conjunctival pallor  Ears, Nose, Mouth, and Throat bilateral external ears no discharge, edema, erythema   Pulmonary   Respiratory effort: No increased work of breathing or signs of respiratory distress  Auscultation of lungs: Clear to auscultation, equal breath sounds bilaterally, no wheezes, no rales, no rhonci  Cardiovascular   Auscultation of heart: Normal rate and rhythm, normal S1 and S2, without murmurs  Examination of extremities for edema and/or varicosities: Normal   no edema  Abdomen   Abdomen: Abnormal   The abdomen was obese  Bowel sounds were normal  The abdomen was soft and nontender     Musculoskeletal   Gait and station: Normal     Psychiatric   Orientation to person, place and time: Normal     Affect: appropriate

## 2020-09-23 ENCOUNTER — HOSPITAL ENCOUNTER (OUTPATIENT)
Dept: SLEEP CENTER | Facility: CLINIC | Age: 55
Discharge: HOME | End: 2020-09-23
Payer: COMMERCIAL

## 2020-09-23 DIAGNOSIS — G47.33 OSA (OBSTRUCTIVE SLEEP APNEA): ICD-10-CM

## 2020-09-23 PROCEDURE — 95810 POLYSOM 6/> YRS 4/> PARAM: CPT | Performed by: INTERNAL MEDICINE

## 2020-09-23 PROCEDURE — 95810 POLYSOM 6/> YRS 4/> PARAM: CPT

## 2020-09-24 NOTE — PROGRESS NOTES
Sleep Study Documentation    Pre-Sleep Study       Sleep testing procedure explained to patient:YES    Patient napped prior to study:YES- less than 30 minutes  Napped after 2PM: no    Caffeine:Dayshift worker after 12PM   Caffeine use:YES- ice tea  12 ounces    Alcohol:Dayshift workers after 5PM: Alcohol use:NO    Typical day for patient:YES       Study Documentation    Sleep Study Indications: nocturnal choking, snoring, excessive daytime [id number], BMI>30,  unrefreshing sleep, witnessed gasping    Sleep Study: Diagnostic   Snore: Moderate  Supplemental O2: no    O2 flow rate (L/min) range n/a  O2 flow rate (L/min) final n/a  Minimum SaO2 82%  Baseline SaO2 95%        Mode of Therapy: n/a  EKG abnormalities: no     EEG abnormalities: no    Sleep Study Recorded < 2 hours: N/A    Sleep Study Recorded > 2 hours but incomplete study: N/A    Sleep Study Recorded 6 hours but no sleep obtained: NO    Patient classification: retired       Post-Sleep Study    Medication used at bedtime or during sleep study:YES other prescription medications    Patient reports time it took to fall asleep:20 to 30 minutes    Patient reports waking up during study:1 to 2 times  Patient reports returning to sleep without difficulty  Patient reports sleeping 4 to 6 hours with dreaming  Patient reports sleep during study:typical    Patient rated sleepiness: Somewhat sleepy or tired    PAP treatment:no

## 2020-09-28 ENCOUNTER — TELEPHONE (OUTPATIENT)
Dept: PULMONOLOGY | Facility: CLINIC | Age: 55
End: 2020-09-28

## 2020-09-28 DIAGNOSIS — G47.33 OSA (OBSTRUCTIVE SLEEP APNEA): Primary | ICD-10-CM

## 2020-09-29 ENCOUNTER — TELEPHONE (OUTPATIENT)
Dept: PULMONOLOGY | Facility: CLINIC | Age: 55
End: 2020-09-29

## 2020-09-29 DIAGNOSIS — G47.33 OSA (OBSTRUCTIVE SLEEP APNEA): Primary | ICD-10-CM

## 2020-10-02 ENCOUNTER — TELEPHONE (OUTPATIENT)
Dept: PULMONOLOGY | Facility: CLINIC | Age: 55
End: 2020-10-02

## 2020-10-05 ENCOUNTER — TELEPHONE (OUTPATIENT)
Dept: GASTROENTEROLOGY | Facility: CLINIC | Age: 55
End: 2020-10-05

## 2020-10-07 ENCOUNTER — OFFICE VISIT (OUTPATIENT)
Dept: BARIATRICS | Facility: CLINIC | Age: 55
End: 2020-10-07
Payer: COMMERCIAL

## 2020-10-07 VITALS
DIASTOLIC BLOOD PRESSURE: 82 MMHG | HEART RATE: 69 BPM | HEIGHT: 70 IN | TEMPERATURE: 97.9 F | WEIGHT: 265 LBS | BODY MASS INDEX: 37.94 KG/M2 | SYSTOLIC BLOOD PRESSURE: 126 MMHG | RESPIRATION RATE: 20 BRPM

## 2020-10-07 DIAGNOSIS — E66.9 OBESITY, CLASS II, BMI 35-39.9: Primary | ICD-10-CM

## 2020-10-07 PROCEDURE — 99213 OFFICE O/P EST LOW 20 MIN: CPT | Performed by: SURGERY

## 2020-10-20 ENCOUNTER — ANESTHESIA EVENT (OUTPATIENT)
Dept: GASTROENTEROLOGY | Facility: HOSPITAL | Age: 55
End: 2020-10-20

## 2020-10-21 ENCOUNTER — HOSPITAL ENCOUNTER (OUTPATIENT)
Dept: GASTROENTEROLOGY | Facility: HOSPITAL | Age: 55
Setting detail: OUTPATIENT SURGERY
Discharge: HOME | End: 2020-10-21
Attending: INTERNAL MEDICINE | Admitting: INTERNAL MEDICINE
Payer: COMMERCIAL

## 2020-10-21 ENCOUNTER — ANESTHESIA (OUTPATIENT)
Dept: GASTROENTEROLOGY | Facility: HOSPITAL | Age: 55
End: 2020-10-21

## 2020-10-21 VITALS
WEIGHT: 264.33 LBS | SYSTOLIC BLOOD PRESSURE: 102 MMHG | TEMPERATURE: 97.4 F | DIASTOLIC BLOOD PRESSURE: 59 MMHG | RESPIRATION RATE: 18 BRPM | HEIGHT: 71 IN | HEART RATE: 59 BPM | OXYGEN SATURATION: 95 % | BODY MASS INDEX: 37.01 KG/M2

## 2020-10-21 VITALS — HEART RATE: 78 BPM

## 2020-10-21 DIAGNOSIS — Z12.11 SCREEN FOR COLON CANCER: ICD-10-CM

## 2020-10-21 LAB — GLUCOSE SERPL-MCNC: 107 MG/DL (ref 65–140)

## 2020-10-21 PROCEDURE — 88305 TISSUE EXAM BY PATHOLOGIST: CPT | Performed by: PATHOLOGY

## 2020-10-21 PROCEDURE — 45380 COLONOSCOPY AND BIOPSY: CPT | Performed by: INTERNAL MEDICINE

## 2020-10-21 PROCEDURE — 82948 REAGENT STRIP/BLOOD GLUCOSE: CPT

## 2020-10-21 RX ORDER — PROPOFOL 10 MG/ML
INJECTION, EMULSION INTRAVENOUS AS NEEDED
Status: DISCONTINUED | OUTPATIENT
Start: 2020-10-21 | End: 2020-10-21

## 2020-10-21 RX ORDER — SODIUM CHLORIDE, SODIUM LACTATE, POTASSIUM CHLORIDE, CALCIUM CHLORIDE 600; 310; 30; 20 MG/100ML; MG/100ML; MG/100ML; MG/100ML
INJECTION, SOLUTION INTRAVENOUS CONTINUOUS PRN
Status: DISCONTINUED | OUTPATIENT
Start: 2020-10-21 | End: 2020-10-21

## 2020-10-21 RX ORDER — LIDOCAINE HYDROCHLORIDE 10 MG/ML
0.5 INJECTION, SOLUTION EPIDURAL; INFILTRATION; INTRACAUDAL; PERINEURAL ONCE AS NEEDED
Status: DISCONTINUED | OUTPATIENT
Start: 2020-10-21 | End: 2020-10-25 | Stop reason: HOSPADM

## 2020-10-21 RX ORDER — SODIUM CHLORIDE, SODIUM LACTATE, POTASSIUM CHLORIDE, CALCIUM CHLORIDE 600; 310; 30; 20 MG/100ML; MG/100ML; MG/100ML; MG/100ML
125 INJECTION, SOLUTION INTRAVENOUS CONTINUOUS
Status: DISCONTINUED | OUTPATIENT
Start: 2020-10-21 | End: 2020-10-25 | Stop reason: HOSPADM

## 2020-10-21 RX ADMIN — SODIUM CHLORIDE, SODIUM LACTATE, POTASSIUM CHLORIDE, AND CALCIUM CHLORIDE: .6; .31; .03; .02 INJECTION, SOLUTION INTRAVENOUS at 07:15

## 2020-10-21 RX ADMIN — PROPOFOL 30 MG: 10 INJECTION, EMULSION INTRAVENOUS at 07:26

## 2020-10-21 RX ADMIN — SODIUM CHLORIDE, SODIUM LACTATE, POTASSIUM CHLORIDE, AND CALCIUM CHLORIDE 125 ML/HR: .6; .31; .03; .02 INJECTION, SOLUTION INTRAVENOUS at 06:57

## 2020-10-21 RX ADMIN — PROPOFOL 30 MG: 10 INJECTION, EMULSION INTRAVENOUS at 07:23

## 2020-10-21 RX ADMIN — PROPOFOL 180 MG: 10 INJECTION, EMULSION INTRAVENOUS at 07:19

## 2020-10-21 RX ADMIN — PROPOFOL 30 MG: 10 INJECTION, EMULSION INTRAVENOUS at 07:21

## 2020-10-21 RX ADMIN — PROPOFOL 30 MG: 10 INJECTION, EMULSION INTRAVENOUS at 07:29

## 2020-10-23 ENCOUNTER — OFFICE VISIT (OUTPATIENT)
Dept: PULMONOLOGY | Facility: CLINIC | Age: 55
End: 2020-10-23
Payer: COMMERCIAL

## 2020-10-23 VITALS
TEMPERATURE: 97.7 F | HEIGHT: 71 IN | DIASTOLIC BLOOD PRESSURE: 80 MMHG | SYSTOLIC BLOOD PRESSURE: 124 MMHG | HEART RATE: 74 BPM | OXYGEN SATURATION: 96 % | BODY MASS INDEX: 37.66 KG/M2 | WEIGHT: 269 LBS

## 2020-10-23 DIAGNOSIS — G47.33 OSA (OBSTRUCTIVE SLEEP APNEA): Primary | ICD-10-CM

## 2020-10-23 DIAGNOSIS — E66.9 OBESITY, CLASS II, BMI 35-39.9: ICD-10-CM

## 2020-10-23 PROCEDURE — 99213 OFFICE O/P EST LOW 20 MIN: CPT | Performed by: PHYSICIAN ASSISTANT

## 2020-10-30 ENCOUNTER — HOSPITAL ENCOUNTER (OUTPATIENT)
Dept: RADIOLOGY | Facility: HOSPITAL | Age: 55
Discharge: HOME | End: 2020-10-30
Payer: COMMERCIAL

## 2020-10-30 DIAGNOSIS — E66.9 OBESITY, CLASS II, BMI 35-39.9: ICD-10-CM

## 2020-10-30 PROCEDURE — 74240 X-RAY XM UPR GI TRC 1CNTRST: CPT

## 2020-11-12 ENCOUNTER — TELEPHONE (OUTPATIENT)
Dept: UROLOGY | Facility: CLINIC | Age: 55
End: 2020-11-12

## 2020-11-12 ENCOUNTER — LAB (OUTPATIENT)
Dept: LAB | Facility: CLINIC | Age: 55
End: 2020-11-12
Payer: COMMERCIAL

## 2020-11-12 DIAGNOSIS — C61 PROSTATE CANCER (HCC): ICD-10-CM

## 2020-11-12 LAB — PSA SERPL-MCNC: 0.2 NG/ML (ref 0–4)

## 2020-11-12 PROCEDURE — 84153 ASSAY OF PSA TOTAL: CPT

## 2020-11-19 ENCOUNTER — CLINICAL SUPPORT (OUTPATIENT)
Dept: BARIATRICS | Facility: CLINIC | Age: 55
End: 2020-11-19

## 2020-11-19 DIAGNOSIS — E66.9 OBESITY, CLASS II, BMI 35-39.9: ICD-10-CM

## 2020-11-19 DIAGNOSIS — K91.2 POSTSURGICAL MALABSORPTION: Primary | ICD-10-CM

## 2020-11-19 PROCEDURE — RECHECK: Performed by: DIETITIAN, REGISTERED

## 2020-12-01 DIAGNOSIS — Z11.59 SPECIAL SCREENING EXAMINATION FOR VIRAL DISEASE: ICD-10-CM

## 2020-12-01 PROCEDURE — U0003 INFECTIOUS AGENT DETECTION BY NUCLEIC ACID (DNA OR RNA); SEVERE ACUTE RESPIRATORY SYNDROME CORONAVIRUS 2 (SARS-COV-2) (CORONAVIRUS DISEASE [COVID-19]), AMPLIFIED PROBE TECHNIQUE, MAKING USE OF HIGH THROUGHPUT TECHNOLOGIES AS DESCRIBED BY CMS-2020-01-R: HCPCS | Performed by: SURGERY

## 2020-12-02 LAB — SARS-COV-2 RNA SPEC QL NAA+PROBE: NOT DETECTED

## 2020-12-03 ENCOUNTER — TELEPHONE (OUTPATIENT)
Dept: BARIATRICS | Facility: CLINIC | Age: 55
End: 2020-12-03

## 2020-12-03 ENCOUNTER — OFFICE VISIT (OUTPATIENT)
Dept: BARIATRICS | Facility: CLINIC | Age: 55
End: 2020-12-03
Payer: COMMERCIAL

## 2020-12-03 VITALS
DIASTOLIC BLOOD PRESSURE: 80 MMHG | WEIGHT: 266 LBS | TEMPERATURE: 98.6 F | BODY MASS INDEX: 38.08 KG/M2 | HEIGHT: 70 IN | HEART RATE: 89 BPM | SYSTOLIC BLOOD PRESSURE: 130 MMHG

## 2020-12-03 DIAGNOSIS — E66.9 OBESITY, CLASS I, BMI 30-34.9: Primary | ICD-10-CM

## 2020-12-03 PROCEDURE — 99214 OFFICE O/P EST MOD 30 MIN: CPT | Performed by: SURGERY

## 2020-12-04 DIAGNOSIS — E66.9 OBESITY, CLASS II, BMI 35-39.9: Primary | ICD-10-CM

## 2020-12-04 RX ORDER — OMEPRAZOLE 20 MG/1
20 CAPSULE, DELAYED RELEASE ORAL DAILY
Qty: 30 CAPSULE | Refills: 3 | Status: SHIPPED | OUTPATIENT
Start: 2020-12-04 | End: 2021-02-26

## 2020-12-04 RX ORDER — OXYCODONE HYDROCHLORIDE 5 MG/1
5 TABLET ORAL EVERY 4 HOURS PRN
Qty: 10 TABLET | Refills: 0 | Status: SHIPPED | OUTPATIENT
Start: 2020-12-08 | End: 2021-11-11 | Stop reason: HOSPADM

## 2020-12-07 ENCOUNTER — ANESTHESIA EVENT (OUTPATIENT)
Dept: PERIOP | Facility: HOSPITAL | Age: 55
DRG: 328 | End: 2020-12-07
Payer: COMMERCIAL

## 2020-12-08 ENCOUNTER — ANESTHESIA (OUTPATIENT)
Dept: PERIOP | Facility: HOSPITAL | Age: 55
DRG: 328 | End: 2020-12-08
Payer: COMMERCIAL

## 2020-12-08 ENCOUNTER — HOSPITAL ENCOUNTER (INPATIENT)
Facility: HOSPITAL | Age: 55
LOS: 1 days | Discharge: HOME | DRG: 328 | End: 2020-12-09
Attending: SURGERY | Admitting: SURGERY
Payer: COMMERCIAL

## 2020-12-08 VITALS — HEART RATE: 77 BPM

## 2020-12-08 DIAGNOSIS — E66.9 OBESITY, CLASS II, BMI 35-39.9: ICD-10-CM

## 2020-12-08 DIAGNOSIS — I10 ESSENTIAL HYPERTENSION: Primary | ICD-10-CM

## 2020-12-08 LAB
GLUCOSE SERPL-MCNC: 146 MG/DL (ref 65–140)
GLUCOSE SERPL-MCNC: 78 MG/DL (ref 65–140)

## 2020-12-08 PROCEDURE — 8E0W4CZ ROBOTIC ASSISTED PROCEDURE OF TRUNK REGION, PERCUTANEOUS ENDOSCOPIC APPROACH: ICD-10-PCS | Performed by: SURGERY

## 2020-12-08 PROCEDURE — 82948 REAGENT STRIP/BLOOD GLUCOSE: CPT

## 2020-12-08 PROCEDURE — 0DN64ZZ RELEASE STOMACH, PERCUTANEOUS ENDOSCOPIC APPROACH: ICD-10-PCS | Performed by: SURGERY

## 2020-12-08 PROCEDURE — C9290 INJ, BUPIVACAINE LIPOSOME: HCPCS | Performed by: STUDENT IN AN ORGANIZED HEALTH CARE EDUCATION/TRAINING PROGRAM

## 2020-12-08 PROCEDURE — 0D164ZA BYPASS STOMACH TO JEJUNUM, PERCUTANEOUS ENDOSCOPIC APPROACH: ICD-10-PCS | Performed by: SURGERY

## 2020-12-08 PROCEDURE — 43659 UNLISTED LAPS PX STOMACH: CPT | Performed by: SURGERY

## 2020-12-08 PROCEDURE — C1781 MESH (IMPLANTABLE): HCPCS | Performed by: SURGERY

## 2020-12-08 PROCEDURE — 99253 IP/OBS CNSLTJ NEW/EST LOW 45: CPT | Performed by: INTERNAL MEDICINE

## 2020-12-08 PROCEDURE — 0DJ08ZZ INSPECTION OF UPPER INTESTINAL TRACT, VIA NATURAL OR ARTIFICIAL OPENING ENDOSCOPIC: ICD-10-PCS | Performed by: SURGERY

## 2020-12-08 PROCEDURE — 43659 UNLISTED LAPS PX STOMACH: CPT | Performed by: STUDENT IN AN ORGANIZED HEALTH CARE EDUCATION/TRAINING PROGRAM

## 2020-12-08 PROCEDURE — 0BUT4JZ SUPPLEMENT DIAPHRAGM WITH SYNTHETIC SUBSTITUTE, PERCUTANEOUS ENDOSCOPIC APPROACH: ICD-10-PCS | Performed by: SURGERY

## 2020-12-08 DEVICE — SEAMGUARD STPL REINF INTUITIVE SUREFORM 60 BLACK: Type: IMPLANTABLE DEVICE | Site: STOMACH | Status: FUNCTIONAL

## 2020-12-08 DEVICE — BIO-A TISSUE REINFORCEMENT 7CMX10CM
Type: IMPLANTABLE DEVICE | Site: ABDOMEN | Status: FUNCTIONAL
Brand: GORE BIO-A TISSUE REINFORCEMENT

## 2020-12-08 RX ORDER — PROPOFOL 10 MG/ML
INJECTION, EMULSION INTRAVENOUS AS NEEDED
Status: DISCONTINUED | OUTPATIENT
Start: 2020-12-08 | End: 2020-12-08

## 2020-12-08 RX ORDER — MAGNESIUM HYDROXIDE 1200 MG/15ML
LIQUID ORAL AS NEEDED
Status: DISCONTINUED | OUTPATIENT
Start: 2020-12-08 | End: 2020-12-08 | Stop reason: HOSPADM

## 2020-12-08 RX ORDER — OXYCODONE HCL 5 MG/5 ML
10 SOLUTION, ORAL ORAL EVERY 4 HOURS PRN
Status: DISCONTINUED | OUTPATIENT
Start: 2020-12-08 | End: 2020-12-09 | Stop reason: HOSPADM

## 2020-12-08 RX ORDER — LISINOPRIL 20 MG/1
20 TABLET ORAL DAILY
Status: DISCONTINUED | OUTPATIENT
Start: 2020-12-09 | End: 2020-12-09

## 2020-12-08 RX ORDER — BUPIVACAINE HYDROCHLORIDE 5 MG/ML
INJECTION, SOLUTION PERINEURAL AS NEEDED
Status: DISCONTINUED | OUTPATIENT
Start: 2020-12-08 | End: 2020-12-08 | Stop reason: HOSPADM

## 2020-12-08 RX ORDER — GABAPENTIN 300 MG/1
300 CAPSULE ORAL EVERY 8 HOURS
Status: DISCONTINUED | OUTPATIENT
Start: 2020-12-08 | End: 2020-12-09 | Stop reason: HOSPADM

## 2020-12-08 RX ORDER — CELECOXIB 200 MG/1
200 CAPSULE ORAL ONCE
Status: COMPLETED | OUTPATIENT
Start: 2020-12-08 | End: 2020-12-08

## 2020-12-08 RX ORDER — ACETAMINOPHEN 160 MG/5ML
975 SUSPENSION, ORAL (FINAL DOSE FORM) ORAL EVERY 8 HOURS
Status: DISCONTINUED | OUTPATIENT
Start: 2020-12-08 | End: 2020-12-09 | Stop reason: HOSPADM

## 2020-12-08 RX ORDER — SCOLOPAMINE TRANSDERMAL SYSTEM 1 MG/1
1 PATCH, EXTENDED RELEASE TRANSDERMAL ONCE
Status: DISCONTINUED | OUTPATIENT
Start: 2020-12-08 | End: 2020-12-09 | Stop reason: HOSPADM

## 2020-12-08 RX ORDER — HEPARIN SODIUM 5000 [USP'U]/ML
5000 INJECTION, SOLUTION INTRAVENOUS; SUBCUTANEOUS
Status: COMPLETED | OUTPATIENT
Start: 2020-12-08 | End: 2020-12-08

## 2020-12-08 RX ORDER — ACETAMINOPHEN 325 MG/1
975 TABLET ORAL ONCE
Status: COMPLETED | OUTPATIENT
Start: 2020-12-08 | End: 2020-12-08

## 2020-12-08 RX ORDER — CEFAZOLIN SODIUM 2 G/50ML
2000 SOLUTION INTRAVENOUS ONCE
Status: COMPLETED | OUTPATIENT
Start: 2020-12-08 | End: 2020-12-08

## 2020-12-08 RX ORDER — ONDANSETRON 2 MG/ML
4 INJECTION INTRAMUSCULAR; INTRAVENOUS ONCE AS NEEDED
Status: COMPLETED | OUTPATIENT
Start: 2020-12-08 | End: 2020-12-08

## 2020-12-08 RX ORDER — EPHEDRINE SULFATE 50 MG/ML
INJECTION INTRAVENOUS AS NEEDED
Status: DISCONTINUED | OUTPATIENT
Start: 2020-12-08 | End: 2020-12-08

## 2020-12-08 RX ORDER — ONDANSETRON 2 MG/ML
INJECTION INTRAMUSCULAR; INTRAVENOUS AS NEEDED
Status: DISCONTINUED | OUTPATIENT
Start: 2020-12-08 | End: 2020-12-08

## 2020-12-08 RX ORDER — MORPHINE SULFATE 4 MG/ML
4 INJECTION, SOLUTION INTRAMUSCULAR; INTRAVENOUS EVERY 4 HOURS PRN
Status: DISCONTINUED | OUTPATIENT
Start: 2020-12-08 | End: 2020-12-09 | Stop reason: HOSPADM

## 2020-12-08 RX ORDER — LIDOCAINE HYDROCHLORIDE 20 MG/ML
INJECTION, SOLUTION EPIDURAL; INFILTRATION; INTRACAUDAL; PERINEURAL AS NEEDED
Status: DISCONTINUED | OUTPATIENT
Start: 2020-12-08 | End: 2020-12-08

## 2020-12-08 RX ORDER — PROMETHAZINE HYDROCHLORIDE 25 MG/ML
12.5 INJECTION, SOLUTION INTRAMUSCULAR; INTRAVENOUS EVERY 4 HOURS PRN
Status: DISCONTINUED | OUTPATIENT
Start: 2020-12-08 | End: 2020-12-08 | Stop reason: HOSPADM

## 2020-12-08 RX ORDER — GABAPENTIN 300 MG/1
600 CAPSULE ORAL ONCE
Status: COMPLETED | OUTPATIENT
Start: 2020-12-08 | End: 2020-12-08

## 2020-12-08 RX ORDER — ROCURONIUM BROMIDE 10 MG/ML
INJECTION, SOLUTION INTRAVENOUS AS NEEDED
Status: DISCONTINUED | OUTPATIENT
Start: 2020-12-08 | End: 2020-12-08

## 2020-12-08 RX ORDER — SODIUM CHLORIDE, SODIUM LACTATE, POTASSIUM CHLORIDE, CALCIUM CHLORIDE 600; 310; 30; 20 MG/100ML; MG/100ML; MG/100ML; MG/100ML
75 INJECTION, SOLUTION INTRAVENOUS CONTINUOUS
Status: DISCONTINUED | OUTPATIENT
Start: 2020-12-08 | End: 2020-12-09 | Stop reason: HOSPADM

## 2020-12-08 RX ORDER — FENTANYL CITRATE 50 UG/ML
INJECTION, SOLUTION INTRAMUSCULAR; INTRAVENOUS AS NEEDED
Status: DISCONTINUED | OUTPATIENT
Start: 2020-12-08 | End: 2020-12-08

## 2020-12-08 RX ORDER — OXYCODONE HCL 5 MG/5 ML
5 SOLUTION, ORAL ORAL EVERY 4 HOURS PRN
Status: DISCONTINUED | OUTPATIENT
Start: 2020-12-08 | End: 2020-12-09 | Stop reason: HOSPADM

## 2020-12-08 RX ORDER — ACETAMINOPHEN 325 MG/1
975 TABLET ORAL EVERY 8 HOURS
Status: DISCONTINUED | OUTPATIENT
Start: 2020-12-08 | End: 2020-12-09 | Stop reason: HOSPADM

## 2020-12-08 RX ORDER — CEFAZOLIN SODIUM 2 G/50ML
2000 SOLUTION INTRAVENOUS EVERY 8 HOURS
Status: COMPLETED | OUTPATIENT
Start: 2020-12-08 | End: 2020-12-09

## 2020-12-08 RX ORDER — SODIUM CHLORIDE, SODIUM LACTATE, POTASSIUM CHLORIDE, CALCIUM CHLORIDE 600; 310; 30; 20 MG/100ML; MG/100ML; MG/100ML; MG/100ML
125 INJECTION, SOLUTION INTRAVENOUS CONTINUOUS
Status: DISCONTINUED | OUTPATIENT
Start: 2020-12-08 | End: 2020-12-08 | Stop reason: HOSPADM

## 2020-12-08 RX ORDER — FLUTICASONE FUROATE AND VILANTEROL 200; 25 UG/1; UG/1
1 POWDER RESPIRATORY (INHALATION) DAILY
Status: DISCONTINUED | OUTPATIENT
Start: 2020-12-09 | End: 2020-12-09 | Stop reason: HOSPADM

## 2020-12-08 RX ORDER — FENTANYL CITRATE/PF 50 MCG/ML
25 SYRINGE (ML) INJECTION
Status: DISCONTINUED | OUTPATIENT
Start: 2020-12-08 | End: 2020-12-08 | Stop reason: HOSPADM

## 2020-12-08 RX ORDER — NEOSTIGMINE METHYLSULFATE 1 MG/ML
INJECTION INTRAVENOUS AS NEEDED
Status: DISCONTINUED | OUTPATIENT
Start: 2020-12-08 | End: 2020-12-08

## 2020-12-08 RX ORDER — MIDAZOLAM HYDROCHLORIDE 2 MG/2ML
INJECTION, SOLUTION INTRAMUSCULAR; INTRAVENOUS AS NEEDED
Status: DISCONTINUED | OUTPATIENT
Start: 2020-12-08 | End: 2020-12-08

## 2020-12-08 RX ORDER — SIMETHICONE 80 MG
80 TABLET,CHEWABLE ORAL 4 TIMES DAILY PRN
Status: DISCONTINUED | OUTPATIENT
Start: 2020-12-08 | End: 2020-12-09 | Stop reason: HOSPADM

## 2020-12-08 RX ORDER — GLYCOPYRROLATE 0.2 MG/ML
INJECTION INTRAMUSCULAR; INTRAVENOUS AS NEEDED
Status: DISCONTINUED | OUTPATIENT
Start: 2020-12-08 | End: 2020-12-08

## 2020-12-08 RX ORDER — HYDROMORPHONE HCL/PF 1 MG/ML
0.5 SYRINGE (ML) INJECTION
Status: DISCONTINUED | OUTPATIENT
Start: 2020-12-08 | End: 2020-12-08 | Stop reason: HOSPADM

## 2020-12-08 RX ORDER — PROMETHAZINE HYDROCHLORIDE 25 MG/ML
25 INJECTION, SOLUTION INTRAMUSCULAR; INTRAVENOUS EVERY 6 HOURS PRN
Status: DISCONTINUED | OUTPATIENT
Start: 2020-12-08 | End: 2020-12-09 | Stop reason: HOSPADM

## 2020-12-08 RX ORDER — TAMSULOSIN HYDROCHLORIDE 0.4 MG/1
0.4 CAPSULE ORAL
Status: DISCONTINUED | OUTPATIENT
Start: 2020-12-08 | End: 2020-12-09 | Stop reason: HOSPADM

## 2020-12-08 RX ORDER — ONDANSETRON 2 MG/ML
4 INJECTION INTRAMUSCULAR; INTRAVENOUS EVERY 6 HOURS PRN
Status: DISCONTINUED | OUTPATIENT
Start: 2020-12-08 | End: 2020-12-09 | Stop reason: HOSPADM

## 2020-12-08 RX ORDER — MEPERIDINE HYDROCHLORIDE 25 MG/ML
12.5 INJECTION INTRAMUSCULAR; INTRAVENOUS; SUBCUTANEOUS
Status: DISCONTINUED | OUTPATIENT
Start: 2020-12-08 | End: 2020-12-08 | Stop reason: HOSPADM

## 2020-12-08 RX ORDER — DEXAMETHASONE SODIUM PHOSPHATE 4 MG/ML
INJECTION, SOLUTION INTRA-ARTICULAR; INTRALESIONAL; INTRAMUSCULAR; INTRAVENOUS; SOFT TISSUE AS NEEDED
Status: DISCONTINUED | OUTPATIENT
Start: 2020-12-08 | End: 2020-12-08

## 2020-12-08 RX ORDER — ALBUTEROL SULFATE 90 UG/1
2 AEROSOL, METERED RESPIRATORY (INHALATION) EVERY 4 HOURS PRN
Status: DISCONTINUED | OUTPATIENT
Start: 2020-12-08 | End: 2020-12-09 | Stop reason: HOSPADM

## 2020-12-08 RX ADMIN — SIMETHICONE 80 MG: 80 TABLET, CHEWABLE ORAL at 21:14

## 2020-12-08 RX ADMIN — PHENYLEPHRINE HYDROCHLORIDE 100 MCG: 10 INJECTION INTRAVENOUS at 07:42

## 2020-12-08 RX ADMIN — TAMSULOSIN HYDROCHLORIDE 0.4 MG: 0.4 CAPSULE ORAL at 17:09

## 2020-12-08 RX ADMIN — GABAPENTIN 300 MG: 300 CAPSULE ORAL at 21:14

## 2020-12-08 RX ADMIN — GLYCOPYRROLATE 0.8 MG: 0.2 INJECTION, SOLUTION INTRAMUSCULAR; INTRAVENOUS at 10:51

## 2020-12-08 RX ADMIN — FAMOTIDINE 20 MG: 10 INJECTION INTRAVENOUS at 15:31

## 2020-12-08 RX ADMIN — TRIMETHOBENZAMIDE HYDROCHLORIDE 200 MG: 100 INJECTION INTRAMUSCULAR at 12:01

## 2020-12-08 RX ADMIN — SCOPALAMINE 1 PATCH: 1 PATCH, EXTENDED RELEASE TRANSDERMAL at 06:01

## 2020-12-08 RX ADMIN — SODIUM CHLORIDE, SODIUM LACTATE, POTASSIUM CHLORIDE, AND CALCIUM CHLORIDE: .6; .31; .03; .02 INJECTION, SOLUTION INTRAVENOUS at 10:02

## 2020-12-08 RX ADMIN — CEFAZOLIN SODIUM 2000 MG: 2 SOLUTION INTRAVENOUS at 07:21

## 2020-12-08 RX ADMIN — METRONIDAZOLE 500 MG: 500 INJECTION, SOLUTION INTRAVENOUS at 16:49

## 2020-12-08 RX ADMIN — MORPHINE SULFATE 2 MG: 2 INJECTION, SOLUTION INTRAMUSCULAR; INTRAVENOUS at 20:21

## 2020-12-08 RX ADMIN — CELECOXIB 200 MG: 200 CAPSULE ORAL at 05:58

## 2020-12-08 RX ADMIN — CEFAZOLIN SODIUM 2000 MG: 2 SOLUTION INTRAVENOUS at 15:31

## 2020-12-08 RX ADMIN — PROPOFOL 200 MG: 10 INJECTION, EMULSION INTRAVENOUS at 07:32

## 2020-12-08 RX ADMIN — SODIUM CHLORIDE, SODIUM LACTATE, POTASSIUM CHLORIDE, AND CALCIUM CHLORIDE: .6; .31; .03; .02 INJECTION, SOLUTION INTRAVENOUS at 10:52

## 2020-12-08 RX ADMIN — HEPARIN SODIUM 5000 UNITS: 5000 INJECTION INTRAVENOUS; SUBCUTANEOUS at 06:28

## 2020-12-08 RX ADMIN — DEXAMETHASONE SODIUM PHOSPHATE 4 MG: 4 INJECTION INTRA-ARTICULAR; INTRALESIONAL; INTRAMUSCULAR; INTRAVENOUS; SOFT TISSUE at 08:08

## 2020-12-08 RX ADMIN — METRONIDAZOLE 500 MG: 500 INJECTION, SOLUTION INTRAVENOUS at 07:49

## 2020-12-08 RX ADMIN — PHENYLEPHRINE HYDROCHLORIDE 100 MCG: 10 INJECTION INTRAVENOUS at 07:52

## 2020-12-08 RX ADMIN — ROCURONIUM BROMIDE 10 MG: 10 INJECTION, SOLUTION INTRAVENOUS at 10:06

## 2020-12-08 RX ADMIN — PHENYLEPHRINE HYDROCHLORIDE 100 MCG: 10 INJECTION INTRAVENOUS at 07:48

## 2020-12-08 RX ADMIN — SODIUM CHLORIDE, SODIUM LACTATE, POTASSIUM CHLORIDE, AND CALCIUM CHLORIDE 125 ML/HR: .6; .31; .03; .02 INJECTION, SOLUTION INTRAVENOUS at 06:25

## 2020-12-08 RX ADMIN — FENTANYL CITRATE 100 MCG: 50 INJECTION, SOLUTION INTRAMUSCULAR; INTRAVENOUS at 07:32

## 2020-12-08 RX ADMIN — ONDANSETRON 4 MG: 2 INJECTION INTRAMUSCULAR; INTRAVENOUS at 15:31

## 2020-12-08 RX ADMIN — SODIUM CHLORIDE, SODIUM LACTATE, POTASSIUM CHLORIDE, AND CALCIUM CHLORIDE 75 ML/HR: .6; .31; .03; .02 INJECTION, SOLUTION INTRAVENOUS at 14:25

## 2020-12-08 RX ADMIN — FENTANYL CITRATE 50 MCG: 50 INJECTION, SOLUTION INTRAMUSCULAR; INTRAVENOUS at 10:57

## 2020-12-08 RX ADMIN — SODIUM CHLORIDE, SODIUM LACTATE, POTASSIUM CHLORIDE, AND CALCIUM CHLORIDE: .6; .31; .03; .02 INJECTION, SOLUTION INTRAVENOUS at 09:00

## 2020-12-08 RX ADMIN — ACETAMINOPHEN 975 MG: 325 TABLET ORAL at 21:14

## 2020-12-08 RX ADMIN — ROCURONIUM BROMIDE 20 MG: 10 INJECTION, SOLUTION INTRAVENOUS at 08:04

## 2020-12-08 RX ADMIN — ROCURONIUM BROMIDE 50 MG: 10 INJECTION, SOLUTION INTRAVENOUS at 07:33

## 2020-12-08 RX ADMIN — PHENYLEPHRINE HYDROCHLORIDE 100 MCG: 10 INJECTION INTRAVENOUS at 08:56

## 2020-12-08 RX ADMIN — SODIUM CHLORIDE, SODIUM LACTATE, POTASSIUM CHLORIDE, AND CALCIUM CHLORIDE 75 ML/HR: .6; .31; .03; .02 INJECTION, SOLUTION INTRAVENOUS at 21:30

## 2020-12-08 RX ADMIN — MIDAZOLAM 2 MG: 1 INJECTION INTRAMUSCULAR; INTRAVENOUS at 07:23

## 2020-12-08 RX ADMIN — MORPHINE SULFATE 4 MG: 4 INJECTION INTRAVENOUS at 15:30

## 2020-12-08 RX ADMIN — NEOSTIGMINE METHYLSULFATE 5 MG: 1 INJECTION, SOLUTION INTRAVENOUS at 10:51

## 2020-12-08 RX ADMIN — ONDANSETRON 4 MG: 2 INJECTION INTRAMUSCULAR; INTRAVENOUS at 11:34

## 2020-12-08 RX ADMIN — FENTANYL CITRATE 50 MCG: 50 INJECTION, SOLUTION INTRAMUSCULAR; INTRAVENOUS at 11:01

## 2020-12-08 RX ADMIN — LIDOCAINE HYDROCHLORIDE 50 MG: 20 INJECTION, SOLUTION EPIDURAL; INFILTRATION; INTRACAUDAL; PERINEURAL at 07:32

## 2020-12-08 RX ADMIN — PHENYLEPHRINE HYDROCHLORIDE 200 MCG: 10 INJECTION INTRAVENOUS at 08:46

## 2020-12-08 RX ADMIN — PHENYLEPHRINE HYDROCHLORIDE 200 MCG: 10 INJECTION INTRAVENOUS at 08:41

## 2020-12-08 RX ADMIN — ROCURONIUM BROMIDE 20 MG: 10 INJECTION, SOLUTION INTRAVENOUS at 10:31

## 2020-12-08 RX ADMIN — ROCURONIUM BROMIDE 10 MG: 10 INJECTION, SOLUTION INTRAVENOUS at 09:43

## 2020-12-08 RX ADMIN — ONDANSETRON 4 MG: 2 INJECTION INTRAMUSCULAR; INTRAVENOUS at 10:52

## 2020-12-08 RX ADMIN — ACETAMINOPHEN 975 MG: 325 TABLET ORAL at 05:58

## 2020-12-08 RX ADMIN — ROCURONIUM BROMIDE 10 MG: 10 INJECTION, SOLUTION INTRAVENOUS at 09:01

## 2020-12-08 RX ADMIN — GABAPENTIN 600 MG: 300 CAPSULE ORAL at 05:58

## 2020-12-08 RX ADMIN — EPHEDRINE SULFATE 10 MG: 50 INJECTION, SOLUTION INTRAVENOUS at 07:52

## 2020-12-09 ENCOUNTER — APPOINTMENT (INPATIENT)
Dept: RADIOLOGY | Facility: HOSPITAL | Age: 55
DRG: 328 | End: 2020-12-09
Payer: COMMERCIAL

## 2020-12-09 VITALS
HEIGHT: 70 IN | HEART RATE: 73 BPM | DIASTOLIC BLOOD PRESSURE: 70 MMHG | WEIGHT: 259.04 LBS | SYSTOLIC BLOOD PRESSURE: 99 MMHG | BODY MASS INDEX: 37.08 KG/M2 | OXYGEN SATURATION: 95 % | RESPIRATION RATE: 18 BRPM | TEMPERATURE: 97.8 F

## 2020-12-09 LAB
ANION GAP SERPL CALCULATED.3IONS-SCNC: 9 MMOL/L (ref 4–13)
BUN SERPL-MCNC: 13 MG/DL (ref 5–25)
CALCIUM SERPL-MCNC: 8.7 MG/DL (ref 8.3–10.1)
CHLORIDE SERPL-SCNC: 102 MMOL/L (ref 100–108)
CO2 SERPL-SCNC: 26 MMOL/L (ref 21–32)
CREAT SERPL-MCNC: 1.09 MG/DL (ref 0.6–1.3)
ERYTHROCYTE [DISTWIDTH] IN BLOOD BY AUTOMATED COUNT: 13.2 % (ref 11.6–15.1)
GFR SERPL CREATININE-BSD FRML MDRD: 88 ML/MIN/1.73SQ M
GLUCOSE SERPL-MCNC: 98 MG/DL (ref 65–140)
HCT VFR BLD AUTO: 35.9 % (ref 36.5–49.3)
HGB BLD-MCNC: 11.5 G/DL (ref 12–17)
MCH RBC QN AUTO: 28.8 PG (ref 26.8–34.3)
MCHC RBC AUTO-ENTMCNC: 32 G/DL (ref 31.4–37.4)
MCV RBC AUTO: 90 FL (ref 82–98)
PLATELET # BLD AUTO: 147 THOUSANDS/UL (ref 149–390)
PMV BLD AUTO: 11.2 FL (ref 8.9–12.7)
POTASSIUM SERPL-SCNC: 3.8 MMOL/L (ref 3.5–5.3)
RBC # BLD AUTO: 4 MILLION/UL (ref 3.88–5.62)
SODIUM SERPL-SCNC: 137 MMOL/L (ref 136–145)
WBC # BLD AUTO: 5.8 THOUSAND/UL (ref 4.31–10.16)

## 2020-12-09 PROCEDURE — 74240 X-RAY XM UPR GI TRC 1CNTRST: CPT

## 2020-12-09 PROCEDURE — 80048 BASIC METABOLIC PNL TOTAL CA: CPT | Performed by: STUDENT IN AN ORGANIZED HEALTH CARE EDUCATION/TRAINING PROGRAM

## 2020-12-09 PROCEDURE — 99232 SBSQ HOSP IP/OBS MODERATE 35: CPT | Performed by: INTERNAL MEDICINE

## 2020-12-09 PROCEDURE — NC001 PR NO CHARGE: Performed by: STUDENT IN AN ORGANIZED HEALTH CARE EDUCATION/TRAINING PROGRAM

## 2020-12-09 PROCEDURE — 99024 POSTOP FOLLOW-UP VISIT: CPT | Performed by: STUDENT IN AN ORGANIZED HEALTH CARE EDUCATION/TRAINING PROGRAM

## 2020-12-09 PROCEDURE — 85027 COMPLETE CBC AUTOMATED: CPT | Performed by: STUDENT IN AN ORGANIZED HEALTH CARE EDUCATION/TRAINING PROGRAM

## 2020-12-09 RX ORDER — LISINOPRIL 10 MG/1
10 TABLET ORAL DAILY
Status: DISCONTINUED | OUTPATIENT
Start: 2020-12-09 | End: 2020-12-09

## 2020-12-09 RX ORDER — ACETAMINOPHEN 325 MG/1
975 TABLET ORAL EVERY 8 HOURS
Qty: 63 TABLET | Refills: 0 | Status: SHIPPED | OUTPATIENT
Start: 2020-12-09 | End: 2020-12-16

## 2020-12-09 RX ORDER — METOCLOPRAMIDE 10 MG/1
10 TABLET ORAL 4 TIMES DAILY
Qty: 28 TABLET | Refills: 0 | Status: SHIPPED | OUTPATIENT
Start: 2020-12-09 | End: 2020-12-16

## 2020-12-09 RX ORDER — ONDANSETRON HYDROCHLORIDE 4 MG/5ML
4 SOLUTION ORAL EVERY 8 HOURS PRN
Qty: 50 ML | Refills: 0 | Status: SHIPPED | OUTPATIENT
Start: 2020-12-09 | End: 2021-01-13 | Stop reason: SDUPTHER

## 2020-12-09 RX ADMIN — ACETAMINOPHEN 975 MG: 325 TABLET ORAL at 05:09

## 2020-12-09 RX ADMIN — GABAPENTIN 300 MG: 300 CAPSULE ORAL at 05:09

## 2020-12-09 RX ADMIN — FAMOTIDINE 20 MG: 10 INJECTION INTRAVENOUS at 08:10

## 2020-12-09 RX ADMIN — SIMETHICONE 80 MG: 80 TABLET, CHEWABLE ORAL at 08:10

## 2020-12-09 RX ADMIN — METRONIDAZOLE 500 MG: 500 INJECTION, SOLUTION INTRAVENOUS at 01:46

## 2020-12-09 RX ADMIN — CEFAZOLIN SODIUM 2000 MG: 2 SOLUTION INTRAVENOUS at 00:07

## 2020-12-09 RX ADMIN — FLUTICASONE FUROATE AND VILANTEROL TRIFENATATE 1 PUFF: 200; 25 POWDER RESPIRATORY (INHALATION) at 08:11

## 2020-12-09 RX ADMIN — MORPHINE SULFATE 2 MG: 2 INJECTION, SOLUTION INTRAMUSCULAR; INTRAVENOUS at 08:11

## 2020-12-09 RX ADMIN — GABAPENTIN 300 MG: 300 CAPSULE ORAL at 13:17

## 2020-12-09 RX ADMIN — LISINOPRIL 10 MG: 10 TABLET ORAL at 08:10

## 2020-12-09 RX ADMIN — ACETAMINOPHEN 975 MG: 325 SUSPENSION ORAL at 13:17

## 2020-12-09 RX ADMIN — OXYCODONE HYDROCHLORIDE 10 MG: 5 SOLUTION ORAL at 16:14

## 2020-12-09 RX ADMIN — TAMSULOSIN HYDROCHLORIDE 0.4 MG: 0.4 CAPSULE ORAL at 16:11

## 2020-12-09 RX ADMIN — IOHEXOL 30 ML: 350 INJECTION, SOLUTION INTRAVENOUS at 11:30

## 2020-12-10 ENCOUNTER — TELEPHONE (OUTPATIENT)
Dept: MEDSURG UNIT | Facility: HOSPITAL | Age: 55
End: 2020-12-10

## 2020-12-17 ENCOUNTER — TELEMEDICINE (OUTPATIENT)
Dept: BARIATRICS | Facility: CLINIC | Age: 55
End: 2020-12-17

## 2020-12-17 DIAGNOSIS — E66.9 OBESITY, CLASS II, BMI 35-39.9: Primary | ICD-10-CM

## 2020-12-17 PROCEDURE — 99024 POSTOP FOLLOW-UP VISIT: CPT | Performed by: SURGERY

## 2021-01-08 ENCOUNTER — OFFICE VISIT (OUTPATIENT)
Dept: PULMONOLOGY | Facility: CLINIC | Age: 56
End: 2021-01-08
Payer: COMMERCIAL

## 2021-01-08 VITALS
HEART RATE: 61 BPM | WEIGHT: 258.8 LBS | DIASTOLIC BLOOD PRESSURE: 84 MMHG | HEIGHT: 70 IN | SYSTOLIC BLOOD PRESSURE: 124 MMHG | TEMPERATURE: 97.5 F | OXYGEN SATURATION: 97 % | BODY MASS INDEX: 37.05 KG/M2

## 2021-01-08 DIAGNOSIS — G47.33 OSA (OBSTRUCTIVE SLEEP APNEA): Primary | ICD-10-CM

## 2021-01-08 DIAGNOSIS — E66.9 OBESITY, CLASS II, BMI 35-39.9: ICD-10-CM

## 2021-01-08 DIAGNOSIS — Z87.891 FORMER SMOKER: ICD-10-CM

## 2021-01-08 DIAGNOSIS — J45.40 MODERATE PERSISTENT ASTHMA WITHOUT COMPLICATION: ICD-10-CM

## 2021-01-08 DIAGNOSIS — I10 ESSENTIAL HYPERTENSION: ICD-10-CM

## 2021-01-08 PROCEDURE — 99214 OFFICE O/P EST MOD 30 MIN: CPT | Performed by: INTERNAL MEDICINE

## 2021-01-08 NOTE — PROGRESS NOTES
Assessment/Plan:   Diagnoses and all orders for this visit:    BARRETT (obstructive sleep apnea)    Obesity, Class II, BMI 35-39 9    Essential hypertension    Moderate persistent asthma without complication    Former smoker      moderate obstructive sleep apnea with an AHI of 23 8 on auto CPAP consistently using it  With decreased nocturnal awakenings and feels well rested when he is up in the morning  Reviewed CPAP download compliance with 100% compliance, no evidence of any mask leak acceptable residual AHI of 3 2  Cleaning of the supplies and change of CPAP supplies discussed  Recommend weight loss  Moderate persistent asthma without complication he continues to use his Flovent 110 mcg 2 puffs twice daily  Rinse mouth after use  Continue with montelukast 10 mg daily at bedtime  Albuterol MDI 2 puffs 4 times daily as needed only  Today he has not been needing the rescue inhaler at all in the past several months  Up-to-date with his flu shot  Follow-up in 6 months or p r n  Earlier as needed  Return in about 6 months (around 7/8/2021)  All questions are answered to the patient's satisfaction and understanding  He verbalizes understanding  He is encouraged to call with any further questions or concerns  Portions of the record may have been created with voice recognition software  Occasional wrong word or "sound a like" substitutions may have occurred due to the inherent limitations of voice recognition software  Read the chart carefully and recognize, using context, where substitutions have occurred      Electronically Signed by Bishop Armenta MD    ______________________________________________________________________    Chief Complaint:   Chief Complaint   Patient presents with    Follow-up    Sleep Apnea       Patient ID: Michael Mireles is a 48 y o  y o  male has a past medical history of Acid reflux, Allergy to cats, Arthritis, Asthma, Back pain, Balance problems, Breathing difficulty, Chronic pain disorder, Colon polyp, Constipation, CPAP (continuous positive airway pressure) dependence, Diabetes mellitus ([facility] ), Enlarged thyroid, Exercise involving walking, Headache, High cholesterol, History of radiation therapy, Hypertension, Hypertriglyceridemia, Knee pain, bilateral, Memory loss, Muscle weakness, Obesity, Postgastrectomy malabsorption, Prostate cancer ([facility] ), RA (rheumatoid arthritis) ([facility] ), Risk for falls, Seasonal allergies, Shortness of breath, Sleep apnea, Unsteady gait, Use of cane as ambulatory aid, Use of cane as ambulatory aid, Wears glasses, and Work related injury  1/8/2021  Patient presents today for follow-up visit  Patient with history of moderate obstructive sleep apnea on CPAP here for follow-up he has been using it consistently and states he has decreased nocturnal awakenings and feels well rested when he is up in the morning  Review of Systems   Constitutional: Positive for fatigue  Negative for appetite change, chills, diaphoresis, fever and unexpected weight change  HENT: Positive for postnasal drip  Negative for congestion, ear discharge, ear pain, nosebleeds, rhinorrhea, sinus pain, sore throat and voice change  Eyes: Negative  Negative for pain, discharge and visual disturbance  Respiratory: Positive for shortness of breath ( at baseline)  Negative for apnea, cough, choking, chest tightness, wheezing and stridor  Cardiovascular: Negative  Negative for chest pain, palpitations and leg swelling  Gastrointestinal: Negative  Negative for abdominal pain, blood in stool, constipation, diarrhea and vomiting  Endocrine: Negative  Negative for cold intolerance, heat intolerance, polydipsia, polyphagia and polyuria  Genitourinary: Negative  Negative for difficulty urinating and dysuria  Musculoskeletal: Positive for back pain  Negative for arthralgias and neck pain  Skin: Negative for pallor and rash  Allergic/Immunologic: Negative    Negative for environmental allergies and food allergies  Neurological: Negative  Negative for dizziness, speech difficulty, weakness and light-headedness  Hematological: Negative  Negative for adenopathy  Does not bruise/bleed easily  Psychiatric/Behavioral: Positive for sleep disturbance  Negative for agitation and confusion  The patient is not nervous/anxious  Smoking history: He reports that he quit smoking about 12 years ago  He smoked 0 00 packs per day for 0 00 years  He has never used smokeless tobacco     The following portions of the patient's history were reviewed and updated as appropriate: allergies, current medications, past family history, past medical history, past social history, past surgical history and problem list     Immunization History   Administered Date(s) Administered    INFLUENZA 11/03/2017, 09/11/2018    Influenza, injectable, quadrivalent, preservative free 0 5 mL 09/11/2018     Current Outpatient Medications   Medication Sig Dispense Refill    albuterol (PROAIR HFA) 90 mcg/act inhaler Inhale 2 (two) times a day       Ascorbic Acid (VITAMIN C PO) Take 1 capsule by mouth 2 (two) times a day      BREO ELLIPTA 200-25 MCG/INH inhaler Inhale 1 puff daily  1    CALCIUM CITRATE PO Take by mouth      docusate sodium (COLACE) 100 mg capsule Take 100 mg by mouth every other day      fenofibrate (TRICOR) 145 mg tablet Take 145 mg by mouth daily   flunisolide (NASALIDE) 25 MCG/ACT (0 025%) SOLN into each nostril as needed       fluticasone (FLOVENT HFA) 110 MCG/ACT inhaler Inhale 2 puffs as needed       Influenza Vac Split Quad (AFLURIA QUADRIVALENT IM) Afluria Qd 2020-21 (36 mos up)(PF)60 mcg (15 mcg x4)/0 5 mL IM syringe      loratadine (CLARITIN) 10 mg tablet as needed       montelukast (SINGULAIR) 10 mg tablet Take 10 mg by mouth daily at bedtime        MULTIPLE VITAMIN PO Take 1 tablet by mouth daily      omeprazole (PriLOSEC) 20 mg delayed release capsule Take 1 capsule (20 mg total) by mouth daily 30 capsule 3    oxyCODONE (ROXICODONE) 5 mg immediate release tablet Take 1 tablet (5 mg total) by mouth every 4 (four) hours as needed for moderate painMax Daily Amount: 30 mg (Patient taking differently: Take 5 mg by mouth every 4 (four) hours as needed for moderate pain Start POST OP) 10 tablet 0    tamsulosin (FLOMAX) 0 4 mg Take 1 capsule (0 4 mg total) by mouth 2 (two) times a day 180 capsule 3    VITAMIN D PO Take by mouth      ondansetron (ZOFRAN) 4 MG/5ML solution Take 5 mL (4 mg total) by mouth every 8 (eight) hours as needed for nausea or vomiting for up to 5 days 50 mL 0     No current facility-administered medications for this visit  Allergies: Pollen extract    Objective:  Vitals:    01/08/21 1148   BP: 124/84   Pulse: 61   Temp: 97 5 °F (36 4 °C)   SpO2: 97%   Weight: 117 kg (258 lb 12 8 oz)   Height: 5' 10" (1 778 m)   Oxygen Therapy  SpO2: 97 %    Wt Readings from Last 3 Encounters:   01/08/21 117 kg (258 lb 12 8 oz)   12/08/20 118 kg (259 lb 0 7 oz)   12/03/20 121 kg (266 lb)     Body mass index is 37 13 kg/m²  Physical Exam  Constitutional:       Appearance: He is well-developed  HENT:      Head: Normocephalic and atraumatic  Eyes:      Pupils: Pupils are equal, round, and reactive to light  Neck:      Musculoskeletal: Normal range of motion and neck supple  Comments: Short and wide neck  Cardiovascular:      Rate and Rhythm: Normal rate and regular rhythm  Heart sounds: Normal heart sounds  Pulmonary:      Effort: Pulmonary effort is normal       Breath sounds: Normal breath sounds  Musculoskeletal: Normal range of motion  Skin:     General: Skin is warm and dry  Neurological:      Mental Status: He is alert and oriented to person, place, and time  Psychiatric:         Behavior: Behavior normal            Diagnostics:  I have personally reviewed pertinent reports        ESS: Total score: 9

## 2021-01-13 DIAGNOSIS — E66.9 OBESITY, CLASS II, BMI 35-39.9: ICD-10-CM

## 2021-01-18 RX ORDER — ONDANSETRON HYDROCHLORIDE 4 MG/5ML
4 SOLUTION ORAL EVERY 8 HOURS PRN
Qty: 50 ML | Refills: 0 | Status: SHIPPED | OUTPATIENT
Start: 2021-01-18 | End: 2021-11-11 | Stop reason: HOSPADM

## 2021-02-03 ENCOUNTER — TELEPHONE (OUTPATIENT)
Dept: BARIATRICS | Facility: CLINIC | Age: 56
End: 2021-02-03

## 2021-02-03 NOTE — TELEPHONE ENCOUNTER
Tried to call pt a to offer a appointment with the  ,however his mailbox is full  Could not leave a message

## 2021-02-26 DIAGNOSIS — E66.9 OBESITY, CLASS II, BMI 35-39.9: ICD-10-CM

## 2021-02-26 RX ORDER — OMEPRAZOLE 20 MG/1
CAPSULE, DELAYED RELEASE ORAL
Qty: 90 CAPSULE | Refills: 1 | Status: SHIPPED | OUTPATIENT
Start: 2021-02-26 | End: 2021-08-20

## 2021-03-04 ENCOUNTER — TELEPHONE (OUTPATIENT)
Dept: OTHER | Facility: OTHER | Age: 56
End: 2021-03-04

## 2021-03-30 DIAGNOSIS — Z23 ENCOUNTER FOR IMMUNIZATION: ICD-10-CM

## 2021-05-24 DIAGNOSIS — C61 PROSTATE CANCER (HCC): Primary | ICD-10-CM

## 2021-08-09 ENCOUNTER — TELEPHONE (OUTPATIENT)
Dept: CARDIOLOGY CLINIC | Facility: CLINIC | Age: 56
End: 2021-08-09

## 2021-08-19 DIAGNOSIS — E66.9 OBESITY, CLASS II, BMI 35-39.9: ICD-10-CM

## 2021-08-20 RX ORDER — OMEPRAZOLE 20 MG/1
CAPSULE, DELAYED RELEASE ORAL
Qty: 90 CAPSULE | Refills: 1 | Status: SHIPPED | OUTPATIENT
Start: 2021-08-20 | End: 2022-03-24

## 2021-09-22 ENCOUNTER — OFFICE VISIT (OUTPATIENT)
Dept: BARIATRICS | Facility: CLINIC | Age: 56
End: 2021-09-22
Payer: COMMERCIAL

## 2021-09-22 VITALS
WEIGHT: 179.5 LBS | TEMPERATURE: 96.8 F | BODY MASS INDEX: 25.7 KG/M2 | DIASTOLIC BLOOD PRESSURE: 68 MMHG | SYSTOLIC BLOOD PRESSURE: 100 MMHG | HEIGHT: 70 IN | HEART RATE: 57 BPM

## 2021-09-22 DIAGNOSIS — K91.2 POSTSURGICAL MALABSORPTION: Primary | ICD-10-CM

## 2021-09-22 PROCEDURE — 99213 OFFICE O/P EST LOW 20 MIN: CPT | Performed by: SURGERY

## 2021-09-22 RX ORDER — LISINOPRIL 20 MG/1
20 TABLET ORAL DAILY
COMMUNITY
Start: 2021-08-17

## 2021-09-22 RX ORDER — HYDROCHLOROTHIAZIDE 12.5 MG/1
12.5 TABLET ORAL DAILY
COMMUNITY
Start: 2021-09-16 | End: 2022-09-16

## 2021-09-22 NOTE — PROGRESS NOTES
POST-OP OFFICE VISIT - BARIATRIC SURGERY  Rafy Gee 55 y o  male MRN: [id number]  Unit/Bed#:  Encounter: [id number]      HPI:  Rafy Gee is a 55 y o  male status post robotic conversion sleeve to RYGB with PEH repair in December 2020 by Dr Niesha Brown who presents for his annual visit    Subjective     Patient is doing very well  He is tolerating a regular diet  Taking is daily vitamins  He denies any symptoms of GERD/reflux  He does note some nausea or occasion, but this is relieved after having a bowel movement  He has an extensive history of constipation requiring stool softeners  He does not take these daily only when he feels nauseous    Current weight: 179 8  BMI:  25 7       Review of Systems   Constitutional: Negative for chills and fever  HENT: Negative for ear pain and sore throat  Eyes: Negative for pain and visual disturbance  Respiratory: Negative for cough and shortness of breath  Cardiovascular: Negative for chest pain and palpitations  Gastrointestinal: Negative for abdominal pain and vomiting  Genitourinary: Negative for dysuria and hematuria  Musculoskeletal: Negative for arthralgias and back pain  Skin: Negative for color change and rash  Neurological: Negative for seizures and syncope  All other systems reviewed and are negative        Historical Information   Past Medical History:   Diagnosis Date    Acid reflux     Allergy to cats     Arthritis     Asthma     Back pain     Balance problems     Breathing difficulty     approx July 2016 pt was having prostate surgery and surgery not completed as he developed "severe breathing problems"    Chronic pain disorder     back and knees    Colon polyp     Constipation     CPAP (continuous positive airway pressure) dependence     Diabetes mellitus (HCC)     HISTORY    Enlarged thyroid     Exercise involving walking     3x/week on treadmill and walks dog    Headache     occas    High cholesterol     History of radiation therapy     last treatment 2016    Hypertension     Hypertriglyceridemia     Knee pain, bilateral     Memory loss     Muscle weakness     Obesity     Postgastrectomy malabsorption     Prostate cancer (HCC)     RA (rheumatoid arthritis) (HCC)     Risk for falls     Seasonal allergies     Shortness of breath     over weight and over activity or asthma    Sleep apnea     cpap    Unsteady gait     "knees buckle"    Use of cane as ambulatory aid     Use of cane as ambulatory aid     Wears glasses     Work related injury     "approx 10 yrs ago has affected knees and lower back"     Past Surgical History:   Procedure Laterality Date    COLONOSCOPY      ESOPHAGOGASTRODUODENOSCOPY      ESOPHAGOGASTRODUODENOSCOPY N/A 2017    Procedure: ESOPHAGOGASTRODUODENOSCOPY (EGD);   Surgeon: Leobardo Naranjo MD;  Location: [facility];  Service: Bariatrics    GASTRIC BYPASS LAPAROSCOPIC N/A 2020    Procedure: Robotic extensive lysis of adhesions Robotic paraesophageal hernia repair with bio a mesh Robotic Nick-en-Y gastric bypass with intraop endoscopy ;  Surgeon: Leobardo Naranjo MD;  Location: [facility];  Service: Bariatrics    INSERTION PROSTATE RADIATION SEED      SC LAP, ANNETTE RESTRICT PROC, LONGITUDINAL GASTRECTOMY N/A 2017    Procedure: GASTRECTOMY SLEEVE LAPAROSCOPIC;  Surgeon: Leobardo Naranjo MD;  Location: [facility];  Service: Bariatrics    SLEEVE GASTROPLASTY      US GUIDED THYROID BIOPSY  2019     Social History   Social History     Substance and Sexual Activity   Alcohol Use No     Social History     Substance and Sexual Activity   Drug Use No     Social History     Tobacco Use   Smoking Status Former Smoker    Packs/day: 0 00    Years: 0 00    Pack years: 0 00    Quit date:     Years since quittin 7   Smokeless Tobacco Never Used       Objective       Current Vitals:   Blood Pressure: 100/68 (21 1338)  Pulse: 57 (21 1338)  Temperature: (!) 96 8 °F (36 °C) (09/22/21 1338)  Temp Source: Tympanic (09/22/21 1338)  Height: 5' 10" (177 8 cm) (09/22/21 1338)  Weight - Scale: 81 4 kg (179 lb 8 oz) (09/22/21 1338)    Invasive Devices     None                 Physical Exam  Constitutional:       Appearance: Normal appearance  Cardiovascular:      Rate and Rhythm: Normal rate and regular rhythm  Abdominal:      General: Abdomen is flat  Palpations: Abdomen is soft  Comments: Well healed surgical incisions   Neurological:      General: No focal deficit present  Mental Status: He is alert and oriented to person, place, and time  Assessment/PLAN:    Robert Briones is a 55 y o  male status post robotic conversion sleeve to RYGB with PEH repair with Dr Sudhir Kearns in December 2020 returning for annual visit      Will order vitamin levels  Follow up in 6 months with Angelita  Please call the clinic with any questions or concerns    Mario Richey MD  Bariatric Surgery  9/22/2021  2:17 PM

## 2021-10-14 ENCOUNTER — TELEPHONE (OUTPATIENT)
Dept: UROLOGY | Facility: MEDICAL CENTER | Age: 56
End: 2021-10-14

## 2021-10-26 ENCOUNTER — APPOINTMENT (OUTPATIENT)
Dept: LAB | Facility: HOSPITAL | Age: 56
End: 2021-10-26
Payer: COMMERCIAL

## 2021-10-26 DIAGNOSIS — C61 PROSTATE CANCER (HCC): ICD-10-CM

## 2021-10-26 LAB — PSA SERPL-MCNC: 0.1 NG/ML (ref 0–4)

## 2021-10-26 PROCEDURE — 84153 ASSAY OF PSA TOTAL: CPT

## 2021-10-27 ENCOUNTER — OFFICE VISIT (OUTPATIENT)
Dept: UROLOGY | Facility: CLINIC | Age: 56
End: 2021-10-27
Payer: COMMERCIAL

## 2021-10-27 VITALS
WEIGHT: 185 LBS | DIASTOLIC BLOOD PRESSURE: 76 MMHG | SYSTOLIC BLOOD PRESSURE: 126 MMHG | BODY MASS INDEX: 26.48 KG/M2 | HEIGHT: 70 IN

## 2021-10-27 DIAGNOSIS — C61 PROSTATE CANCER (HCC): Primary | ICD-10-CM

## 2021-10-27 DIAGNOSIS — R39.9 LOWER URINARY TRACT SYMPTOMS: ICD-10-CM

## 2021-10-27 PROCEDURE — 99213 OFFICE O/P EST LOW 20 MIN: CPT | Performed by: PHYSICIAN ASSISTANT

## 2021-10-27 RX ORDER — TAMSULOSIN HYDROCHLORIDE 0.4 MG/1
0.4 CAPSULE ORAL 2 TIMES DAILY
Qty: 180 CAPSULE | Refills: 3 | Status: SHIPPED | OUTPATIENT
Start: 2021-10-27 | End: 2022-04-27 | Stop reason: SDUPTHER

## 2021-11-10 ENCOUNTER — TELEPHONE (OUTPATIENT)
Dept: OTHER | Facility: OTHER | Age: 56
End: 2021-11-10

## 2021-11-10 ENCOUNTER — TELEPHONE (OUTPATIENT)
Dept: OTHER | Facility: HOSPITAL | Age: 56
End: 2021-11-10

## 2021-11-11 ENCOUNTER — HOSPITAL ENCOUNTER (OUTPATIENT)
Facility: HOSPITAL | Age: 56
Discharge: HOME | End: 2021-11-11
Attending: EMERGENCY MEDICINE | Admitting: SURGERY
Payer: COMMERCIAL

## 2021-11-11 ENCOUNTER — ANESTHESIA (OUTPATIENT)
Dept: PERIOP | Facility: HOSPITAL | Age: 56
End: 2021-11-11
Payer: COMMERCIAL

## 2021-11-11 ENCOUNTER — ANESTHESIA EVENT (OUTPATIENT)
Dept: PERIOP | Facility: HOSPITAL | Age: 56
End: 2021-11-11
Payer: COMMERCIAL

## 2021-11-11 ENCOUNTER — APPOINTMENT (EMERGENCY)
Dept: CT IMAGING | Facility: HOSPITAL | Age: 56
End: 2021-11-11
Payer: COMMERCIAL

## 2021-11-11 VITALS
WEIGHT: 179 LBS | BODY MASS INDEX: 25.62 KG/M2 | OXYGEN SATURATION: 99 % | TEMPERATURE: 97.8 F | SYSTOLIC BLOOD PRESSURE: 122 MMHG | DIASTOLIC BLOOD PRESSURE: 65 MMHG | HEIGHT: 70 IN | HEART RATE: 63 BPM | RESPIRATION RATE: 16 BRPM

## 2021-11-11 DIAGNOSIS — K45.8 INTERNAL HERNIA: Primary | ICD-10-CM

## 2021-11-11 DIAGNOSIS — L76.82 INCISIONAL PAIN: ICD-10-CM

## 2021-11-11 LAB
ALBUMIN SERPL BCP-MCNC: 3.9 G/DL (ref 3.5–5)
ALP SERPL-CCNC: 40 U/L (ref 46–116)
ALT SERPL W P-5'-P-CCNC: 22 U/L (ref 12–78)
ANION GAP SERPL CALCULATED.3IONS-SCNC: 10 MMOL/L (ref 4–13)
AST SERPL W P-5'-P-CCNC: 16 U/L (ref 5–45)
BASOPHILS # BLD AUTO: 0.02 THOUSANDS/ΜL (ref 0–0.1)
BASOPHILS NFR BLD AUTO: 0 % (ref 0–1)
BILIRUB SERPL-MCNC: 0.47 MG/DL (ref 0.2–1)
BUN SERPL-MCNC: 29 MG/DL (ref 5–25)
CALCIUM SERPL-MCNC: 8.5 MG/DL (ref 8.3–10.1)
CHLORIDE SERPL-SCNC: 102 MMOL/L (ref 100–108)
CO2 SERPL-SCNC: 30 MMOL/L (ref 21–32)
CREAT SERPL-MCNC: 0.97 MG/DL (ref 0.6–1.3)
EOSINOPHIL # BLD AUTO: 0.04 THOUSAND/ΜL (ref 0–0.61)
EOSINOPHIL NFR BLD AUTO: 1 % (ref 0–6)
ERYTHROCYTE [DISTWIDTH] IN BLOOD BY AUTOMATED COUNT: 13.7 % (ref 11.6–15.1)
GFR SERPL CREATININE-BSD FRML MDRD: 101 ML/MIN/1.73SQ M
GLUCOSE SERPL-MCNC: 106 MG/DL (ref 65–140)
GLUCOSE SERPL-MCNC: 108 MG/DL (ref 65–140)
GLUCOSE SERPL-MCNC: 68 MG/DL (ref 65–140)
HCT VFR BLD AUTO: 37.8 % (ref 36.5–49.3)
HGB BLD-MCNC: 12.6 G/DL (ref 12–17)
IMM GRANULOCYTES # BLD AUTO: 0.02 THOUSAND/UL (ref 0–0.2)
IMM GRANULOCYTES NFR BLD AUTO: 0 % (ref 0–2)
LACTATE SERPL-SCNC: 0.5 MMOL/L (ref 0.5–2)
LIPASE SERPL-CCNC: 127 U/L (ref 73–393)
LYMPHOCYTES # BLD AUTO: 0.74 THOUSANDS/ΜL (ref 0.6–4.47)
LYMPHOCYTES NFR BLD AUTO: 16 % (ref 14–44)
MCH RBC QN AUTO: 30 PG (ref 26.8–34.3)
MCHC RBC AUTO-ENTMCNC: 33.3 G/DL (ref 31.4–37.4)
MCV RBC AUTO: 90 FL (ref 82–98)
MONOCYTES # BLD AUTO: 0.43 THOUSAND/ΜL (ref 0.17–1.22)
MONOCYTES NFR BLD AUTO: 10 % (ref 4–12)
NEUTROPHILS # BLD AUTO: 3.26 THOUSANDS/ΜL (ref 1.85–7.62)
NEUTS SEG NFR BLD AUTO: 73 % (ref 43–75)
NRBC BLD AUTO-RTO: 0 /100 WBCS
PLATELET # BLD AUTO: 140 THOUSANDS/UL (ref 149–390)
PMV BLD AUTO: 10.6 FL (ref 8.9–12.7)
POTASSIUM SERPL-SCNC: 2.8 MMOL/L (ref 3.5–5.3)
POTASSIUM SERPL-SCNC: 3.2 MMOL/L (ref 3.5–5.3)
PROT SERPL-MCNC: 7 G/DL (ref 6.4–8.2)
RBC # BLD AUTO: 4.2 MILLION/UL (ref 3.88–5.62)
SODIUM SERPL-SCNC: 142 MMOL/L (ref 136–145)
WBC # BLD AUTO: 4.51 THOUSAND/UL (ref 4.31–10.16)

## 2021-11-11 PROCEDURE — 84132 ASSAY OF SERUM POTASSIUM: CPT | Performed by: NURSE ANESTHETIST, CERTIFIED REGISTERED

## 2021-11-11 PROCEDURE — 99285 EMERGENCY DEPT VISIT HI MDM: CPT

## 2021-11-11 PROCEDURE — 36415 COLL VENOUS BLD VENIPUNCTURE: CPT | Performed by: PHYSICIAN ASSISTANT

## 2021-11-11 PROCEDURE — 83690 ASSAY OF LIPASE: CPT | Performed by: PHYSICIAN ASSISTANT

## 2021-11-11 PROCEDURE — C9290 INJ, BUPIVACAINE LIPOSOME: HCPCS | Performed by: SURGERY

## 2021-11-11 PROCEDURE — 99220 PR INITIAL OBSERVATION CARE/DAY 70 MINUTES: CPT | Performed by: SURGERY

## 2021-11-11 PROCEDURE — 49659 UNLSTD LAPS PX HRNAP HRNRPHY: CPT | Performed by: SURGERY

## 2021-11-11 PROCEDURE — 96365 THER/PROPH/DIAG IV INF INIT: CPT

## 2021-11-11 PROCEDURE — 80053 COMPREHEN METABOLIC PANEL: CPT | Performed by: PHYSICIAN ASSISTANT

## 2021-11-11 PROCEDURE — 96375 TX/PRO/DX INJ NEW DRUG ADDON: CPT

## 2021-11-11 PROCEDURE — 85025 COMPLETE CBC W/AUTO DIFF WBC: CPT | Performed by: PHYSICIAN ASSISTANT

## 2021-11-11 PROCEDURE — 99024 POSTOP FOLLOW-UP VISIT: CPT | Performed by: SURGERY

## 2021-11-11 PROCEDURE — 96366 THER/PROPH/DIAG IV INF ADDON: CPT

## 2021-11-11 PROCEDURE — 82948 REAGENT STRIP/BLOOD GLUCOSE: CPT

## 2021-11-11 PROCEDURE — 99285 EMERGENCY DEPT VISIT HI MDM: CPT | Performed by: PHYSICIAN ASSISTANT

## 2021-11-11 PROCEDURE — 74177 CT ABD & PELVIS W/CONTRAST: CPT

## 2021-11-11 PROCEDURE — 96361 HYDRATE IV INFUSION ADD-ON: CPT

## 2021-11-11 PROCEDURE — 83605 ASSAY OF LACTIC ACID: CPT | Performed by: PHYSICIAN ASSISTANT

## 2021-11-11 RX ORDER — ALBUTEROL SULFATE 2.5 MG/3ML
2.5 SOLUTION RESPIRATORY (INHALATION) ONCE AS NEEDED
Status: DISCONTINUED | OUTPATIENT
Start: 2021-11-11 | End: 2021-11-11 | Stop reason: HOSPADM

## 2021-11-11 RX ORDER — ACETAMINOPHEN 160 MG/5ML
650 SUSPENSION, ORAL (FINAL DOSE FORM) ORAL EVERY 4 HOURS PRN
Status: DISCONTINUED | OUTPATIENT
Start: 2021-11-11 | End: 2021-11-11

## 2021-11-11 RX ORDER — PROPOFOL 10 MG/ML
INJECTION, EMULSION INTRAVENOUS AS NEEDED
Status: DISCONTINUED | OUTPATIENT
Start: 2021-11-11 | End: 2021-11-11

## 2021-11-11 RX ORDER — SUCCINYLCHOLINE/SOD CL,ISO/PF 100 MG/5ML
SYRINGE (ML) INTRAVENOUS AS NEEDED
Status: DISCONTINUED | OUTPATIENT
Start: 2021-11-11 | End: 2021-11-11

## 2021-11-11 RX ORDER — LIDOCAINE HYDROCHLORIDE 10 MG/ML
INJECTION, SOLUTION EPIDURAL; INFILTRATION; INTRACAUDAL; PERINEURAL AS NEEDED
Status: DISCONTINUED | OUTPATIENT
Start: 2021-11-11 | End: 2021-11-11

## 2021-11-11 RX ORDER — SIMETHICONE 80 MG
80 TABLET,CHEWABLE ORAL 4 TIMES DAILY PRN
Status: DISCONTINUED | OUTPATIENT
Start: 2021-11-11 | End: 2021-11-11 | Stop reason: HOSPADM

## 2021-11-11 RX ORDER — LIDOCAINE HYDROCHLORIDE 10 MG/ML
0.5 INJECTION, SOLUTION EPIDURAL; INFILTRATION; INTRACAUDAL; PERINEURAL ONCE AS NEEDED
Status: DISCONTINUED | OUTPATIENT
Start: 2021-11-11 | End: 2021-11-11 | Stop reason: HOSPADM

## 2021-11-11 RX ORDER — MIDAZOLAM HYDROCHLORIDE 2 MG/2ML
INJECTION, SOLUTION INTRAMUSCULAR; INTRAVENOUS AS NEEDED
Status: DISCONTINUED | OUTPATIENT
Start: 2021-11-11 | End: 2021-11-11

## 2021-11-11 RX ORDER — SODIUM CHLORIDE 9 MG/ML
INJECTION, SOLUTION INTRAVENOUS CONTINUOUS PRN
Status: DISCONTINUED | OUTPATIENT
Start: 2021-11-11 | End: 2021-11-11

## 2021-11-11 RX ORDER — FENTANYL CITRATE/PF 50 MCG/ML
50 SYRINGE (ML) INJECTION
Status: DISCONTINUED | OUTPATIENT
Start: 2021-11-11 | End: 2021-11-11 | Stop reason: HOSPADM

## 2021-11-11 RX ORDER — HEPARIN SODIUM 5000 [USP'U]/ML
5000 INJECTION, SOLUTION INTRAVENOUS; SUBCUTANEOUS EVERY 8 HOURS SCHEDULED
Status: DISCONTINUED | OUTPATIENT
Start: 2021-11-11 | End: 2021-11-11 | Stop reason: HOSPADM

## 2021-11-11 RX ORDER — EPHEDRINE SULFATE 50 MG/ML
INJECTION INTRAVENOUS AS NEEDED
Status: DISCONTINUED | OUTPATIENT
Start: 2021-11-11 | End: 2021-11-11

## 2021-11-11 RX ORDER — SODIUM CHLORIDE 9 MG/ML
INJECTION INTRAVENOUS AS NEEDED
Status: DISCONTINUED | OUTPATIENT
Start: 2021-11-11 | End: 2021-11-11 | Stop reason: HOSPADM

## 2021-11-11 RX ORDER — OXYCODONE HYDROCHLORIDE 5 MG/1
5 TABLET ORAL EVERY 4 HOURS PRN
Qty: 10 TABLET | Refills: 0 | Status: SHIPPED | OUTPATIENT
Start: 2021-11-11 | End: 2021-11-21

## 2021-11-11 RX ORDER — ACETAMINOPHEN 160 MG/5ML
650 SUSPENSION, ORAL (FINAL DOSE FORM) ORAL EVERY 6 HOURS SCHEDULED
Status: DISCONTINUED | OUTPATIENT
Start: 2021-11-11 | End: 2021-11-11 | Stop reason: HOSPADM

## 2021-11-11 RX ORDER — PROMETHAZINE HYDROCHLORIDE 25 MG/ML
12.5 INJECTION, SOLUTION INTRAMUSCULAR; INTRAVENOUS ONCE AS NEEDED
Status: DISCONTINUED | OUTPATIENT
Start: 2021-11-11 | End: 2021-11-11 | Stop reason: HOSPADM

## 2021-11-11 RX ORDER — ACETAMINOPHEN 325 MG/1
975 TABLET ORAL EVERY 8 HOURS SCHEDULED
Qty: 63 TABLET | Refills: 0
Start: 2021-11-11 | End: 2021-11-18

## 2021-11-11 RX ORDER — ONDANSETRON 2 MG/ML
INJECTION INTRAMUSCULAR; INTRAVENOUS AS NEEDED
Status: DISCONTINUED | OUTPATIENT
Start: 2021-11-11 | End: 2021-11-11

## 2021-11-11 RX ORDER — HYDROMORPHONE HCL/PF 1 MG/ML
0.4 SYRINGE (ML) INJECTION
Status: DISCONTINUED | OUTPATIENT
Start: 2021-11-11 | End: 2021-11-11 | Stop reason: HOSPADM

## 2021-11-11 RX ORDER — POTASSIUM CHLORIDE 20 MEQ/1
40 TABLET, EXTENDED RELEASE ORAL ONCE
Status: COMPLETED | OUTPATIENT
Start: 2021-11-11 | End: 2021-11-11

## 2021-11-11 RX ORDER — MAGNESIUM HYDROXIDE 1200 MG/15ML
LIQUID ORAL AS NEEDED
Status: DISCONTINUED | OUTPATIENT
Start: 2021-11-11 | End: 2021-11-11 | Stop reason: HOSPADM

## 2021-11-11 RX ORDER — ONDANSETRON 2 MG/ML
4 INJECTION INTRAMUSCULAR; INTRAVENOUS EVERY 6 HOURS PRN
Status: DISCONTINUED | OUTPATIENT
Start: 2021-11-11 | End: 2021-11-11 | Stop reason: HOSPADM

## 2021-11-11 RX ORDER — SODIUM CHLORIDE, SODIUM LACTATE, POTASSIUM CHLORIDE, CALCIUM CHLORIDE 600; 310; 30; 20 MG/100ML; MG/100ML; MG/100ML; MG/100ML
75 INJECTION, SOLUTION INTRAVENOUS CONTINUOUS
Status: DISCONTINUED | OUTPATIENT
Start: 2021-11-11 | End: 2021-11-11 | Stop reason: HOSPADM

## 2021-11-11 RX ORDER — CEFAZOLIN SODIUM 2 G/50ML
2000 SOLUTION INTRAVENOUS
Status: COMPLETED | OUTPATIENT
Start: 2021-11-11 | End: 2021-11-11

## 2021-11-11 RX ORDER — OXYCODONE HCL 5 MG/5 ML
5 SOLUTION, ORAL ORAL EVERY 4 HOURS PRN
Status: DISCONTINUED | OUTPATIENT
Start: 2021-11-11 | End: 2021-11-11 | Stop reason: HOSPADM

## 2021-11-11 RX ORDER — BUPIVACAINE HYDROCHLORIDE 5 MG/ML
INJECTION, SOLUTION PERINEURAL AS NEEDED
Status: DISCONTINUED | OUTPATIENT
Start: 2021-11-11 | End: 2021-11-11 | Stop reason: HOSPADM

## 2021-11-11 RX ORDER — ROCURONIUM BROMIDE 10 MG/ML
INJECTION, SOLUTION INTRAVENOUS AS NEEDED
Status: DISCONTINUED | OUTPATIENT
Start: 2021-11-11 | End: 2021-11-11

## 2021-11-11 RX ORDER — POTASSIUM CHLORIDE 14.9 MG/ML
20 INJECTION INTRAVENOUS ONCE
Status: COMPLETED | OUTPATIENT
Start: 2021-11-11 | End: 2021-11-11

## 2021-11-11 RX ORDER — METOCLOPRAMIDE HYDROCHLORIDE 5 MG/ML
10 INJECTION INTRAMUSCULAR; INTRAVENOUS EVERY 6 HOURS PRN
Status: DISCONTINUED | OUTPATIENT
Start: 2021-11-11 | End: 2021-11-11 | Stop reason: HOSPADM

## 2021-11-11 RX ORDER — ONDANSETRON 2 MG/ML
4 INJECTION INTRAMUSCULAR; INTRAVENOUS ONCE AS NEEDED
Status: DISCONTINUED | OUTPATIENT
Start: 2021-11-11 | End: 2021-11-11 | Stop reason: HOSPADM

## 2021-11-11 RX ORDER — KETOROLAC TROMETHAMINE 30 MG/ML
15 INJECTION, SOLUTION INTRAMUSCULAR; INTRAVENOUS ONCE
Status: COMPLETED | OUTPATIENT
Start: 2021-11-11 | End: 2021-11-11

## 2021-11-11 RX ORDER — METOCLOPRAMIDE HYDROCHLORIDE 5 MG/ML
10 INJECTION INTRAMUSCULAR; INTRAVENOUS ONCE AS NEEDED
Status: DISCONTINUED | OUTPATIENT
Start: 2021-11-11 | End: 2021-11-11 | Stop reason: HOSPADM

## 2021-11-11 RX ORDER — FENTANYL CITRATE 50 UG/ML
INJECTION, SOLUTION INTRAMUSCULAR; INTRAVENOUS AS NEEDED
Status: DISCONTINUED | OUTPATIENT
Start: 2021-11-11 | End: 2021-11-11

## 2021-11-11 RX ORDER — OXYCODONE HCL 5 MG/5 ML
10 SOLUTION, ORAL ORAL EVERY 4 HOURS PRN
Status: DISCONTINUED | OUTPATIENT
Start: 2021-11-11 | End: 2021-11-11 | Stop reason: HOSPADM

## 2021-11-11 RX ADMIN — MIDAZOLAM HYDROCHLORIDE 2 MG: 1 INJECTION, SOLUTION INTRAMUSCULAR; INTRAVENOUS at 06:21

## 2021-11-11 RX ADMIN — SODIUM CHLORIDE, SODIUM LACTATE, POTASSIUM CHLORIDE, AND CALCIUM CHLORIDE 125 ML/HR: .6; .31; .03; .02 INJECTION, SOLUTION INTRAVENOUS at 04:41

## 2021-11-11 RX ADMIN — EPHEDRINE SULFATE 10 MG: 50 INJECTION, SOLUTION INTRAVENOUS at 06:32

## 2021-11-11 RX ADMIN — POTASSIUM CHLORIDE 20 MEQ: 14.9 INJECTION, SOLUTION INTRAVENOUS at 02:26

## 2021-11-11 RX ADMIN — EPHEDRINE SULFATE 10 MG: 50 INJECTION, SOLUTION INTRAVENOUS at 06:40

## 2021-11-11 RX ADMIN — Medication 100 MG: at 06:45

## 2021-11-11 RX ADMIN — ONDANSETRON 4 MG: 2 INJECTION INTRAMUSCULAR; INTRAVENOUS at 07:54

## 2021-11-11 RX ADMIN — IOHEXOL 100 ML: 350 INJECTION, SOLUTION INTRAVENOUS at 02:06

## 2021-11-11 RX ADMIN — ROCURONIUM BROMIDE 10 MG: 10 INJECTION, SOLUTION INTRAVENOUS at 06:53

## 2021-11-11 RX ADMIN — HEPARIN SODIUM 5000 UNITS: 5000 INJECTION INTRAVENOUS; SUBCUTANEOUS at 15:08

## 2021-11-11 RX ADMIN — SUGAMMADEX 200 MG: 100 INJECTION, SOLUTION INTRAVENOUS at 07:55

## 2021-11-11 RX ADMIN — FENTANYL CITRATE 100 MCG: 50 INJECTION, SOLUTION INTRAMUSCULAR; INTRAVENOUS at 06:23

## 2021-11-11 RX ADMIN — SODIUM CHLORIDE 1000 ML: 0.9 INJECTION, SOLUTION INTRAVENOUS at 01:23

## 2021-11-11 RX ADMIN — SODIUM CHLORIDE 500 ML: 0.9 INJECTION, SOLUTION INTRAVENOUS at 02:26

## 2021-11-11 RX ADMIN — ROCURONIUM BROMIDE 100 MG: 10 INJECTION, SOLUTION INTRAVENOUS at 06:25

## 2021-11-11 RX ADMIN — CEFAZOLIN SODIUM 2000 MG: 2 SOLUTION INTRAVENOUS at 06:21

## 2021-11-11 RX ADMIN — METRONIDAZOLE 500 MG: 500 INJECTION, SOLUTION INTRAVENOUS at 06:13

## 2021-11-11 RX ADMIN — LIDOCAINE HYDROCHLORIDE 50 MG: 10 INJECTION, SOLUTION EPIDURAL; INFILTRATION; INTRACAUDAL; PERINEURAL at 06:23

## 2021-11-11 RX ADMIN — POTASSIUM CHLORIDE 40 MEQ: 1500 TABLET, EXTENDED RELEASE ORAL at 02:26

## 2021-11-11 RX ADMIN — Medication 650 MG: at 11:10

## 2021-11-11 RX ADMIN — KETOROLAC TROMETHAMINE 15 MG: 30 INJECTION, SOLUTION INTRAMUSCULAR at 01:23

## 2021-11-11 RX ADMIN — FAMOTIDINE 20 MG: 10 INJECTION, SOLUTION INTRAVENOUS at 11:10

## 2021-11-11 RX ADMIN — IOHEXOL 25 ML: 240 INJECTION, SOLUTION INTRATHECAL; INTRAVASCULAR; INTRAVENOUS; ORAL at 02:06

## 2021-11-11 RX ADMIN — HEPARIN SODIUM 5000 UNITS: 5000 INJECTION INTRAVENOUS; SUBCUTANEOUS at 06:27

## 2021-11-11 RX ADMIN — FENTANYL CITRATE 50 MCG: 50 INJECTION, SOLUTION INTRAMUSCULAR; INTRAVENOUS at 08:30

## 2021-11-11 RX ADMIN — SODIUM CHLORIDE, SODIUM LACTATE, POTASSIUM CHLORIDE, AND CALCIUM CHLORIDE 75 ML/HR: .6; .31; .03; .02 INJECTION, SOLUTION INTRAVENOUS at 11:10

## 2021-11-11 RX ADMIN — ROCURONIUM BROMIDE 20 MG: 10 INJECTION, SOLUTION INTRAVENOUS at 06:38

## 2021-11-11 RX ADMIN — PROPOFOL 200 MG: 10 INJECTION, EMULSION INTRAVENOUS at 06:25

## 2021-11-11 RX ADMIN — SODIUM CHLORIDE, SODIUM LACTATE, POTASSIUM CHLORIDE, AND CALCIUM CHLORIDE: .6; .31; .03; .02 INJECTION, SOLUTION INTRAVENOUS at 08:07

## 2021-11-11 RX ADMIN — SODIUM CHLORIDE: 0.9 INJECTION, SOLUTION INTRAVENOUS at 06:27

## 2021-12-09 ENCOUNTER — OFFICE VISIT (OUTPATIENT)
Dept: BARIATRICS | Facility: CLINIC | Age: 56
End: 2021-12-09

## 2021-12-09 ENCOUNTER — APPOINTMENT (OUTPATIENT)
Dept: LAB | Facility: HOSPITAL | Age: 56
End: 2021-12-09
Payer: COMMERCIAL

## 2021-12-09 VITALS
BODY MASS INDEX: 25.28 KG/M2 | DIASTOLIC BLOOD PRESSURE: 72 MMHG | SYSTOLIC BLOOD PRESSURE: 124 MMHG | WEIGHT: 176.6 LBS | HEIGHT: 70 IN | TEMPERATURE: 98 F | HEART RATE: 63 BPM

## 2021-12-09 DIAGNOSIS — Z48.815 ENCOUNTER FOR SURGICAL AFTERCARE FOLLOWING SURGERY OF DIGESTIVE SYSTEM: Primary | ICD-10-CM

## 2021-12-09 DIAGNOSIS — K59.00 CONSTIPATION: ICD-10-CM

## 2021-12-09 DIAGNOSIS — R10.10 PAIN OF UPPER ABDOMEN: ICD-10-CM

## 2021-12-09 DIAGNOSIS — K91.2 POSTSURGICAL MALABSORPTION: ICD-10-CM

## 2021-12-09 DIAGNOSIS — Z98.84 BARIATRIC SURGERY STATUS: ICD-10-CM

## 2021-12-09 LAB
25(OH)D3 SERPL-MCNC: 37.4 NG/ML (ref 30–100)
ALBUMIN SERPL BCP-MCNC: 3.9 G/DL (ref 3.5–5)
ALP SERPL-CCNC: 41 U/L (ref 46–116)
ALT SERPL W P-5'-P-CCNC: 25 U/L (ref 12–78)
ANION GAP SERPL CALCULATED.3IONS-SCNC: 7 MMOL/L (ref 4–13)
AST SERPL W P-5'-P-CCNC: 19 U/L (ref 5–45)
BILIRUB SERPL-MCNC: 0.68 MG/DL (ref 0.2–1)
BUN SERPL-MCNC: 20 MG/DL (ref 5–25)
CALCIUM SERPL-MCNC: 8.8 MG/DL (ref 8.3–10.1)
CHLORIDE SERPL-SCNC: 111 MMOL/L (ref 100–108)
CHOLEST SERPL-MCNC: 110 MG/DL
CO2 SERPL-SCNC: 31 MMOL/L (ref 21–32)
CREAT SERPL-MCNC: 0.83 MG/DL (ref 0.6–1.3)
ERYTHROCYTE [DISTWIDTH] IN BLOOD BY AUTOMATED COUNT: 14 % (ref 11.6–15.1)
FERRITIN SERPL-MCNC: 157 NG/ML (ref 8–388)
FOLATE SERPL-MCNC: >20 NG/ML (ref 3.1–17.5)
GFR SERPL CREATININE-BSD FRML MDRD: 115 ML/MIN/1.73SQ M
GLUCOSE P FAST SERPL-MCNC: 86 MG/DL (ref 65–99)
HCT VFR BLD AUTO: 36.5 % (ref 36.5–49.3)
HDLC SERPL-MCNC: 70 MG/DL
HGB BLD-MCNC: 12 G/DL (ref 12–17)
IRON SERPL-MCNC: 71 UG/DL (ref 65–175)
LDLC SERPL CALC-MCNC: 33 MG/DL (ref 0–100)
MCH RBC QN AUTO: 30.6 PG (ref 26.8–34.3)
MCHC RBC AUTO-ENTMCNC: 32.9 G/DL (ref 31.4–37.4)
MCV RBC AUTO: 93 FL (ref 82–98)
NONHDLC SERPL-MCNC: 40 MG/DL
PLATELET # BLD AUTO: 119 THOUSANDS/UL (ref 149–390)
PMV BLD AUTO: 11.3 FL (ref 8.9–12.7)
POTASSIUM SERPL-SCNC: 3.4 MMOL/L (ref 3.5–5.3)
PROT SERPL-MCNC: 7.2 G/DL (ref 6.4–8.2)
PTH-INTACT SERPL-MCNC: 25.1 PG/ML (ref 18.4–80.1)
RBC # BLD AUTO: 3.92 MILLION/UL (ref 3.88–5.62)
SODIUM SERPL-SCNC: 149 MMOL/L (ref 136–145)
T4 FREE SERPL-MCNC: 1.14 NG/DL (ref 0.76–1.46)
TRIGL SERPL-MCNC: 35 MG/DL
TSH SERPL DL<=0.05 MIU/L-ACNC: 0.01 UIU/ML (ref 0.36–3.74)
VIT B12 SERPL-MCNC: 629 PG/ML (ref 100–900)
WBC # BLD AUTO: 3.28 THOUSAND/UL (ref 4.31–10.16)

## 2021-12-09 PROCEDURE — 99024 POSTOP FOLLOW-UP VISIT: CPT | Performed by: NURSE PRACTITIONER

## 2021-12-09 PROCEDURE — 85027 COMPLETE CBC AUTOMATED: CPT

## 2021-12-09 PROCEDURE — 82746 ASSAY OF FOLIC ACID SERUM: CPT

## 2021-12-09 PROCEDURE — 80053 COMPREHEN METABOLIC PANEL: CPT

## 2021-12-09 PROCEDURE — 83970 ASSAY OF PARATHORMONE: CPT

## 2021-12-09 PROCEDURE — 84630 ASSAY OF ZINC: CPT

## 2021-12-09 PROCEDURE — 36415 COLL VENOUS BLD VENIPUNCTURE: CPT

## 2021-12-09 PROCEDURE — 82607 VITAMIN B-12: CPT

## 2021-12-09 PROCEDURE — 84425 ASSAY OF VITAMIN B-1: CPT

## 2021-12-09 PROCEDURE — 84443 ASSAY THYROID STIM HORMONE: CPT

## 2021-12-09 PROCEDURE — 80061 LIPID PANEL: CPT

## 2021-12-09 PROCEDURE — 82728 ASSAY OF FERRITIN: CPT

## 2021-12-09 PROCEDURE — 84439 ASSAY OF FREE THYROXINE: CPT

## 2021-12-09 PROCEDURE — 83540 ASSAY OF IRON: CPT

## 2021-12-09 PROCEDURE — 84590 ASSAY OF VITAMIN A: CPT

## 2021-12-09 PROCEDURE — 82306 VITAMIN D 25 HYDROXY: CPT

## 2021-12-11 LAB — ZINC SERPL-MCNC: 81 UG/DL (ref 44–115)

## 2021-12-13 LAB — VIT B1 BLD-SCNC: 113.3 NMOL/L (ref 66.5–200)

## 2021-12-14 LAB — VIT A SERPL-MCNC: 25.4 UG/DL (ref 20.1–62)

## 2022-03-08 ENCOUNTER — OFFICE VISIT (OUTPATIENT)
Dept: BARIATRICS | Facility: CLINIC | Age: 57
End: 2022-03-08
Payer: COMMERCIAL

## 2022-03-08 VITALS
DIASTOLIC BLOOD PRESSURE: 80 MMHG | HEIGHT: 70 IN | BODY MASS INDEX: 26.56 KG/M2 | TEMPERATURE: 97.5 F | HEART RATE: 64 BPM | WEIGHT: 185.5 LBS | SYSTOLIC BLOOD PRESSURE: 130 MMHG

## 2022-03-08 DIAGNOSIS — K22.70 BARRETT'S ESOPHAGUS: ICD-10-CM

## 2022-03-08 DIAGNOSIS — Z98.84 BARIATRIC SURGERY STATUS: ICD-10-CM

## 2022-03-08 DIAGNOSIS — Z48.815 ENCOUNTER FOR SURGICAL AFTERCARE FOLLOWING SURGERY OF DIGESTIVE SYSTEM: Primary | ICD-10-CM

## 2022-03-08 DIAGNOSIS — K21.9 GERD (GASTROESOPHAGEAL REFLUX DISEASE): ICD-10-CM

## 2022-03-08 DIAGNOSIS — K91.2 POSTSURGICAL MALABSORPTION: ICD-10-CM

## 2022-03-08 PROCEDURE — 99213 OFFICE O/P EST LOW 20 MIN: CPT | Performed by: NURSE PRACTITIONER

## 2022-03-08 NOTE — PROGRESS NOTES
Assessment/Plan:  PLAN:   - Epigastric pain resolved for the most part  May be related to sudden discontinuation use of PPI  Patient restarted and is feeling better  Able to tolerate his food  Advised to limit gastric irritants, avoid laying down shortly after eating, try to eat meals earlier than bedtime  Recommended seeing RD however defer at this time  - Continue with PPI for now  - Routine follow up in [address] [place] [address] for RNYGB  - Follow up with GI for Ramírez's  - Continue with healthy lifestyle, adequate protein intake of 60 gm, fluid intake of at least 64 oz  - Continue with MVI daily  - Activity as tolerated  - Labs ordered and will adjust accordingly if any deficiency  - Follow up with RD and SW as needed  No problem-specific Assessment & Plan notes found for this encounter  Diagnoses and all orders for this visit:    Encounter for surgical aftercare following surgery of digestive system  -     Ambulatory referral to Gastroenterology; Future    Ramírez's esophagus  -     Ambulatory referral to Gastroenterology; Future    Bariatric surgery status  -     Ambulatory referral to Gastroenterology; Future    Postsurgical malabsorption  -     Ambulatory referral to Gastroenterology; Future    GERD (gastroesophageal reflux disease)          Subjective:      Patient ID: Calton Schilder is a 55 y o  male  Patient with hx of gastric sleeve 06/20/2017 with conversion to gastric bypass in 12/08/2020 by Dr Momo Sawant, who underwent a LAPAROSCOPY DIAGNOSTIC, REDUCTION OF HERNIA, REPAIR INTERNAL HERNIA, INTRA-OP EGD on 11/11/2021 with Dr Mayi Anderson  Patient presents here for mid-epigastric pain approximately a month ago  Worsens after dinner with "burning sensation and with cramps"  Associated constipation - takes miralax daily however not as effective  Simultaneously at this time, he ran out of his omeprazole and was unable to get another refill   Was taking PRN gas-x but found to not be effective  Now has started back on his PPI daily and his pain has been better  Able to tolerate his regular foods  Denies any nausea or vomiting  The following portions of the patient's history were reviewed and updated as appropriate: allergies, current medications, past family history, past medical history, past social history, past surgical history and problem list     Review of Systems   Constitutional: Negative  Respiratory: Negative  Cardiovascular: Negative  Gastrointestinal: Positive for abdominal pain (EPIGASTRIC PAIN)  Reflux  Objective:      /80 (BP Location: Right arm, Patient Position: Sitting)   Pulse 64   Temp 97 5 °F (36 4 °C) (Tympanic)   Ht 5' 10" (1 778 m)   Wt 84 1 kg (185 lb 8 oz)   BMI 26 62 kg/m²          Physical Exam  Vitals and nursing note reviewed  Constitutional:       Appearance: Normal appearance  He is normal weight  Cardiovascular:      Rate and Rhythm: Normal rate and regular rhythm  Pulses: Normal pulses  Heart sounds: Normal heart sounds  Pulmonary:      Effort: Pulmonary effort is normal       Breath sounds: Normal breath sounds  Abdominal:      General: Bowel sounds are normal       Palpations: Abdomen is soft  Tenderness: There is no abdominal tenderness  Musculoskeletal:         General: Normal range of motion  Comments: Uses a cane   Skin:     General: Skin is warm and dry  Neurological:      General: No focal deficit present  Mental Status: He is alert and oriented to person, place, and time  Mental status is at baseline  Psychiatric:         Mood and Affect: Mood normal          Behavior: Behavior normal          Thought Content:  Thought content normal          Judgment: Judgment normal

## 2022-03-23 ENCOUNTER — OFFICE VISIT (OUTPATIENT)
Dept: CARDIOLOGY CLINIC | Facility: CLINIC | Age: 57
End: 2022-03-23
Payer: COMMERCIAL

## 2022-03-23 VITALS
BODY MASS INDEX: 26.34 KG/M2 | WEIGHT: 184 LBS | HEART RATE: 60 BPM | HEIGHT: 70 IN | OXYGEN SATURATION: 98 % | SYSTOLIC BLOOD PRESSURE: 144 MMHG | DIASTOLIC BLOOD PRESSURE: 98 MMHG | RESPIRATION RATE: 16 BRPM

## 2022-03-23 DIAGNOSIS — R06.00 DYSPNEA ON EXERTION: ICD-10-CM

## 2022-03-23 DIAGNOSIS — Z78.9 FALSE POSITIVE CARDIAC STRESS TEST: ICD-10-CM

## 2022-03-23 DIAGNOSIS — I10 PRIMARY HYPERTENSION: ICD-10-CM

## 2022-03-23 DIAGNOSIS — R07.89 CHEST TIGHTNESS: Primary | ICD-10-CM

## 2022-03-23 DIAGNOSIS — E78.2 MIXED HYPERLIPIDEMIA: ICD-10-CM

## 2022-03-23 PROCEDURE — 99214 OFFICE O/P EST MOD 30 MIN: CPT | Performed by: INTERNAL MEDICINE

## 2022-03-23 PROCEDURE — 93000 ELECTROCARDIOGRAM COMPLETE: CPT | Performed by: INTERNAL MEDICINE

## 2022-03-23 RX ORDER — FLUTICASONE PROPIONATE 50 MCG
SPRAY, SUSPENSION (ML) NASAL
COMMUNITY
Start: 2022-03-15

## 2022-03-23 RX ORDER — FEXOFENADINE HCL 180 MG/1
180 TABLET ORAL DAILY
COMMUNITY
Start: 2022-03-15 | End: 2022-04-14

## 2022-03-23 NOTE — PROGRESS NOTES
[facility]  [id number]  Cardiology Follow Up    José Manuel Jain  1965  [id number]      1  Chest tightness  CTA cardiac   2  False positive cardiac stress test     3  Dyspnea on exertion  CTA cardiac   4  Primary hypertension  CTA cardiac   5  Mixed hyperlipidemia  CTA cardiac       Chief Complaint   Patient presents with    Follow-up       Interval History:   60-year-old male with hypertension, hyperlipidemia, status post gastric sleeve surgery in 2017 with revision in December 2020, sleep apnea presented for follow-up    Patient was seen in Cardiology Clinic in July 2020 for preoperative cardiac risk assessment  He underwent echo and stress test which did not show any significant finding and patient underwent successful gastric revision surgery  He has lost 100 lb since then and is able to maintain weight loss  Patient was noted to have hernia at the revision site and so he underwent repeat surgery in December 2021  As a part of preop he had echocardiogram done in October 2021 which showed normal LVEF at [facility]   He underwent a stress test which showed LVEF of 45% with inferior perfusion defects which appears to be diaphragmatic attenuation(patient had similar findings on previous Lexiscan in 2020)  He was seen in Cardiology Clinic at [facility] in November 2021 and was medically managed and underwent successful surgery    As per patient lately he is feeling left precordial soreness and burning  He has chronic burning for many years but the soreness is something new and happens mostly after large meal    He denies any similar symptoms on walking or in between meals    He has chronic shortness of breath on exertion which is unchanged  He walks with a cane due to knee and back issues    Was lately noted to be dizzy and his blood pressure was low so his lisinopril was discontinued and currently patient is on hydrochlorothiazide    Labs from December 2021 reviewed  LDL 33  Current medications reviewed    As per patient home blood pressure is around 120/70 patient monitor its daily he thinks his blood pressure is high today as he was rushing to the clinic  I reviewed echo and stress test results in Care everywhere done at [facility] in October 2021        Review of Systems:   All review of system negative except as mentioned above    Patient Active Problem List   Diagnosis    Prostate cancer ([address] )    Obesity, Class II, BMI 35-39 9    Headache    Memory difficulty    Postsurgical malabsorption    Encounter for surgical aftercare following surgery of digestive system    Hypertension    Vitamin D insufficiency    Ramírez's esophagus determined by biopsy    Weight gain, abnormal    Dysphagia    BARRETT (obstructive sleep apnea)    Internal hernia     Past Medical History:   Diagnosis Date    Acid reflux     Allergy to cats     Arthritis     Asthma     Back pain     Balance problems     Breathing difficulty     approx July 2016 pt was having prostate surgery and surgery not completed as he developed "severe breathing problems"    Chronic pain disorder     back and knees    Colon polyp     Constipation     CPAP (continuous positive airway pressure) dependence     Diabetes mellitus ([address] )     HISTORY    Enlarged thyroid     Exercise involving walking     3x/week on treadmill and walks dog    Headache     occas    High cholesterol     History of radiation therapy     last treatment 12/2016    Hypertension     Hypertriglyceridemia     Knee pain, bilateral     Memory loss     Muscle weakness     Obesity     Postgastrectomy malabsorption     Prostate cancer (HCC)     RA (rheumatoid arthritis) (HCC)     Risk for falls     Seasonal allergies     Shortness of breath     over weight and over activity or asthma    Sleep apnea     cpap    Unsteady gait     "knees buckle"    Use of cane as ambulatory aid     Use of cane as ambulatory aid     Wears glasses     Work related injury     "approx 10 yrs ago has affected knees and lower back"     Social History     Socioeconomic History    Marital status:      Spouse name: Not on file    Number of children: Not on file    Years of education: Not on file    Highest education level: Not on file   Occupational History    Not on file   Tobacco Use    Smoking status: Former Smoker     Packs/day: 0 00     Years: 0 00     Pack years: 0 00     Quit date:      Years since quittin 2    Smokeless tobacco: Never Used   Vaping Use    Vaping Use: Never used   Substance and Sexual Activity    Alcohol use: No    Drug use: No    Sexual activity: Yes   Other Topics Concern    Not on file   Social History Narrative    Not on file     Social Determinants of Health     Financial Resource Strain: Not on file   Food Insecurity: Not on file   Transportation Needs: Not on file   Physical Activity: Not on file   Stress: Not on file   Social Connections: Not on file   Intimate Partner Violence: Not on file   Housing Stability: Not on file      Family History   Problem Relation Age of Onset    Arthritis Mother     Hypertension Mother     Sleep apnea Mother     Prostate cancer Father     Lung cancer Cousin      Past Surgical History:   Procedure Laterality Date    COLONOSCOPY      ESOPHAGOGASTRODUODENOSCOPY      ESOPHAGOGASTRODUODENOSCOPY N/A 2017    Procedure: ESOPHAGOGASTRODUODENOSCOPY (EGD);   Surgeon: Janet Christianson MD;  [facility];  Service: Bariatrics    GASTRIC BYPASS LAPAROSCOPIC N/A 2020    Procedure: Robotic extensive lysis of adhesions Robotic paraesophageal hernia repair with bio a mesh Robotic Inck-en-Y gastric bypass with intraop endoscopy ;  Surgeon: Janet Christianson MD;  [facility];  Service: Bariatrics    INSERTION PROSTATE RADIATION SEED      ME LAP, ANNETTE RESTRICT PROC, LONGITUDINAL GASTRECTOMY N/A 6/20/2017    Procedure: GASTRECTOMY SLEEVE LAPAROSCOPIC;  Surgeon: Ana Leyva MD;  [facility];  Service: Bariatrics    ID LAP,DIAGNOSTIC ABDOMEN N/A 11/11/2021    Procedure: LAPAROSCOPY DIAGNOSTIC, REDUCTION OF HERNIA, REPAIR INTERNAL HERNIA, INTRA-OP EGD;  Surgeon: Marcy Canseco MD;  [facility];  Service: Bariatrics    SLEEVE GASTROPLASTY      US GUIDED THYROID BIOPSY  1/31/2019       Current Outpatient Medications:     albuterol (PROAIR HFA) 90 mcg/act inhaler, Inhale 2 (two) times a day , Disp: , Rfl:     Ascorbic Acid (VITAMIN C PO), Take 1 capsule by mouth 2 (two) times a day, Disp: , Rfl:     BREO ELLIPTA 200-25 MCG/INH inhaler, Inhale 1 puff daily, Disp: , Rfl: 1    CALCIUM CITRATE PO, Take by mouth, Disp: , Rfl:     cyanocobalamin (VITAMIN B-12) 1000 MCG tablet, Take 1,000 mcg by mouth daily, Disp: , Rfl:     docusate sodium (COLACE) 100 mg capsule, Take 100 mg by mouth every other day, Disp: , Rfl:     fenofibrate (TRICOR) 145 mg tablet, Take 145 mg by mouth daily  , Disp: , Rfl:     fexofenadine (ALLEGRA) 180 MG tablet, Take 180 mg by mouth daily, Disp: , Rfl:     flunisolide (NASALIDE) 25 MCG/ACT (0 025%) SOLN, into each nostril as needed , Disp: , Rfl:     fluticasone (FLONASE) 50 mcg/act nasal spray, SPRAY 2 SPRAYS INTO EACH NOSTRIL EVERY DAY, Disp: , Rfl:     fluticasone (FLOVENT HFA) 110 MCG/ACT inhaler, Inhale 2 puffs as needed , Disp: , Rfl:     hydrochlorothiazide (HYDRODIURIL) 12 5 mg tablet, Take 12 5 mg by mouth daily, Disp: , Rfl:     loratadine (CLARITIN) 10 mg tablet, as needed , Disp: , Rfl:     montelukast (SINGULAIR) 10 mg tablet, Take 10 mg by mouth daily at bedtime  , Disp: , Rfl:     MULTIPLE VITAMIN PO, Take 1 tablet by mouth daily, Disp: , Rfl:     omeprazole (PriLOSEC) 20 mg delayed release capsule, TAKE 1 CAPSULE BY MOUTH EVERY DAY, Disp: 90 capsule, Rfl: 1    tamsulosin (FLOMAX) 0 4 mg, Take 1 capsule (0 4 mg total) by mouth 2 (two) times a day, Disp: 180 capsule, Rfl: 3    VITAMIN D PO, Take by mouth, Disp: , Rfl:     lisinopril (ZESTRIL) 20 mg tablet, Take 20 mg by mouth daily (Patient not taking: Reported on 3/23/2022 ), Disp: , Rfl:   Allergies   Allergen Reactions    Pollen Extract        Labs:  Appointment on 12/09/2021   Component Date Value    WBC 12/09/2021 3 28*    RBC 12/09/2021 3 92     Hemoglobin 12/09/2021 12 0     Hematocrit 12/09/2021 36 5     MCV 12/09/2021 93     MCH 12/09/2021 30 6     MCHC 12/09/2021 32 9     RDW 12/09/2021 14 0     Platelets 17/19/8989 119*    MPV 12/09/2021 11 3     Sodium 12/09/2021 149*    Potassium 12/09/2021 3 4*    Chloride 12/09/2021 111*    CO2 12/09/2021 31     ANION GAP 12/09/2021 7     BUN 12/09/2021 20     Creatinine 12/09/2021 0 83     Glucose, Fasting 12/09/2021 86     Calcium 12/09/2021 8 8     AST 12/09/2021 19     ALT 12/09/2021 25     Alkaline Phosphatase 12/09/2021 41*    Total Protein 12/09/2021 7 2     Albumin 12/09/2021 3 9     Total Bilirubin 12/09/2021 0 68     eGFR 12/09/2021 115     Cholesterol 12/09/2021 110     Triglycerides 12/09/2021 35     HDL, Direct 12/09/2021 70     LDL Calculated 12/09/2021 33     Non-HDL-Chol (CHOL-HDL) 12/09/2021 40     TSH 3RD GENERATON 12/09/2021 0 013*    Iron 12/09/2021 71     Ferritin 12/09/2021 157     Folate 12/09/2021 >20 0*    PTH 12/09/2021 25 1     Zinc 12/09/2021 81     Vitamin A 12/09/2021 25 4     Vitamin B1, Whole Blood 12/09/2021 113 3     Vit D, 25-Hydroxy 12/09/2021 37 4     Vitamin B-12 12/09/2021 629     Free T4 12/09/2021 1 14    Admission on 11/11/2021, Discharged on 11/11/2021   Component Date Value    WBC 11/11/2021 4 51     RBC 11/11/2021 4 20     Hemoglobin 11/11/2021 12 6     Hematocrit 11/11/2021 37 8     MCV 11/11/2021 90     MCH 11/11/2021 30 0     MCHC 11/11/2021 33 3     RDW 11/11/2021 13 7     MPV 11/11/2021 10 6     Platelets 07/13/8683 140*    nRBC 11/11/2021 0     Neutrophils Relative 11/11/2021 73     Immat GRANS % 11/11/2021 0     Lymphocytes Relative 11/11/2021 16     Monocytes Relative 11/11/2021 10     Eosinophils Relative 11/11/2021 1     Basophils Relative 11/11/2021 0     Neutrophils Absolute 11/11/2021 3 26     Immature Grans Absolute 11/11/2021 0 02     Lymphocytes Absolute 11/11/2021 0 74     Monocytes Absolute 11/11/2021 0 43     Eosinophils Absolute 11/11/2021 0 04     Basophils Absolute 11/11/2021 0 02     Sodium 11/11/2021 142     Potassium 11/11/2021 2 8*    Chloride 11/11/2021 102     CO2 11/11/2021 30     ANION GAP 11/11/2021 10     BUN 11/11/2021 29*    Creatinine 11/11/2021 0 97     Glucose 11/11/2021 108     Calcium 11/11/2021 8 5     AST 11/11/2021 16     ALT 11/11/2021 22     Alkaline Phosphatase 11/11/2021 40*    Total Protein 11/11/2021 7 0     Albumin 11/11/2021 3 9     Total Bilirubin 11/11/2021 0 47     eGFR 11/11/2021 101     Lipase 11/11/2021 127     LACTIC ACID 11/11/2021 0 5     Potassium 11/11/2021 3 2*    POC Glucose 11/11/2021 106     POC Glucose 11/11/2021 68    Appointment on 10/26/2021   Component Date Value    PSA, Diagnostic 10/26/2021 0 1      Imaging: No results found      Physical Exam:  General:  moderate built, awake, alert and oriented x3, not in distress, walks with a cane  Neck: supple, no JVD  Eyes: PERRL, conjunctiva normal  Lungs:  Bilateral air entry positive, no wheeze/rhonchi or crackle  Heart:  S1-S2 normal, no murmur  Abdomen:  Soft ,nondistended ,nontender, bowel sounds positive  Extremities:  No leg edema, no deformity, ROM normal  Neuro:  Moving all extremities, speech clear  Skin: warm, no rash    /98 (BP Location: Left arm, Patient Position: Sitting, Cuff Size: Standard)   Pulse 60   Resp 16   Ht 5' 10" (1 778 m)   Wt 83 5 kg (184 lb)   SpO2 98%   BMI 26 40 kg/m²     Cardiographics :  EKG today sinus bradycardia heart rate 57, left anterior fascicular block, no significant changes as compared to prior EKG, nonspecific ST changes     As mentioned above      Assessment:    1  Chest soreness   No chest soreness on ambulation  Intermediate probability of angina    2  Dyspnea on exertion - chronic and unchanged  3  Hypertension  As per patient home blood pressure is always controlled  4  Hyperlipidemia  5  Gastric bypass surgery in 2017 followed by revision in 2020  6  GERD/Ramírez esophagus    Recommendations:    Patient with cardiac risk factors including above-mentioned symptoms will benefit from evaluation of coronary artery disease  Given finding of artifact versus defect on previous to [facility] he will benefit from cardiac catheterization versus CT scan of coronaries  , risk, benefit and alternatives of cardiac catheterization and CT scan of coronaries discussed with the patient  At present time patient is 1 interested in noninvasive evaluation  Patient want to hold off on starting aspirin given prior bariatric surgery until CT coronary disease done  Continue hydrochlorothiazide 12 5 mg daily continue fenofibrate  Cannot start beta-blocker due to bradycardia    Patient advised to call 911 or go to nearest ED if any progression of symptoms hours as needed  Advised to take low-salt, low-fat/low-cholesterol diet and try to lose weight  Return to clinic in 6 months or early as needed  Above all discussed with patient    Patient understands and agrees

## 2022-03-24 ENCOUNTER — OFFICE VISIT (OUTPATIENT)
Dept: GASTROENTEROLOGY | Facility: CLINIC | Age: 57
End: 2022-03-24
Payer: COMMERCIAL

## 2022-03-24 VITALS
RESPIRATION RATE: 18 BRPM | BODY MASS INDEX: 26.63 KG/M2 | OXYGEN SATURATION: 99 % | WEIGHT: 186 LBS | DIASTOLIC BLOOD PRESSURE: 70 MMHG | HEART RATE: 56 BPM | HEIGHT: 70 IN | SYSTOLIC BLOOD PRESSURE: 102 MMHG

## 2022-03-24 DIAGNOSIS — Z48.815 ENCOUNTER FOR SURGICAL AFTERCARE FOLLOWING SURGERY OF DIGESTIVE SYSTEM: ICD-10-CM

## 2022-03-24 DIAGNOSIS — R10.13 EPIGASTRIC PAIN: Primary | ICD-10-CM

## 2022-03-24 PROBLEM — D69.6 PLATELETS DECREASED (HCC): Status: ACTIVE | Noted: 2022-03-24

## 2022-03-24 PROBLEM — E66.01 SEVERE OBESITY WITH BODY MASS INDEX (BMI) OF 35.0 TO 39.9 WITH SERIOUS COMORBIDITY (HCC): Status: ACTIVE | Noted: 2022-03-24

## 2022-03-24 PROCEDURE — 99214 OFFICE O/P EST MOD 30 MIN: CPT | Performed by: PHYSICIAN ASSISTANT

## 2022-03-24 RX ORDER — OMEPRAZOLE 20 MG/1
20 CAPSULE, DELAYED RELEASE ORAL 2 TIMES DAILY
Qty: 60 CAPSULE | Refills: 1 | Status: SHIPPED | OUTPATIENT
Start: 2022-03-24 | End: 2022-04-21

## 2022-03-24 NOTE — PROGRESS NOTES
[facility] - Outpatient Follow-up Note  Galileo Hendrickson 55 y o  male MRN: [id number]  Encounter: [id number]          ASSESSMENT AND PLAN:      1  Epigastric pain    Patient presents for an evaluation of epigastric discomfort x 1 month which is worse after eating  He also reports a long history of GERD with heartburn as well as describes intermittent dysphagia  He has a history of a sleeve gastrectomy that was converted to gastric bypass  He also has repair of an internal hernia in November  Will plan for EGD to investigate for an anastomotic ulcer, esophagitis, strictures, bx for h pylori, etc   Will also check an ultrasound to evaluate the gallbladder and CT Scan A/P to further investigate  Will increase his Omeprazole to BID x 6-8 weeks  ______________________________________________________________________    SUBJECTIVE:  Patient is a pleasant 55year year old male who presents to the office for an evaluation of abdominal pain  He reports epigastric discomfort x 1 month which is worse after eating  He can have radiation of the discomfort to the chest   He describes a burning component to the pain  He also reports a long history of GERD with heartburn as well as describes intermittent dysphagia  He has a history of a sleeve gastrectomy that was converted to gastric bypass  He also had a repair of an internal hernia in November  No vomiting  No melena or constipation  He does take stool softeners for his chronic constipation and has a BM about every other day  No family history of esophageal, stomach, or colon cancer  No NSAIDs  He is also seeing cardiology and having a CT of the coronaries  He had a colonoscopy in October of 2020 and 2 adenomas were removed  REVIEW OF SYSTEMS IS OTHERWISE NEGATIVE        Historical Information   Past Medical History:   Diagnosis Date    Acid reflux     Allergy to cats     Arthritis     Asthma     Back pain     Balance problems     Breathing difficulty     approx July 2016 pt was having prostate surgery and surgery not completed as he developed "severe breathing problems"    Chronic pain disorder     back and knees    Colon polyp     Constipation     CPAP (continuous positive airway pressure) dependence     Diabetes mellitus ([address] )     HISTORY    Enlarged thyroid     Exercise involving walking     3x/week on treadmill and walks dog    Headache     occas    High cholesterol     History of radiation therapy     last treatment 12/2016    Hypertension     Hypertriglyceridemia     Knee pain, bilateral     Memory loss     Muscle weakness     Obesity     Postgastrectomy malabsorption     Prostate cancer (HCC)     RA (rheumatoid arthritis) (HCC)     Risk for falls     Seasonal allergies     Shortness of breath     over weight and over activity or asthma    Sleep apnea     cpap    Unsteady gait     "knees buckle"    Use of cane as ambulatory aid     Use of cane as ambulatory aid     Wears glasses     Work related injury     "approx 10 yrs ago has affected knees and lower back"     Past Surgical History:   Procedure Laterality Date    COLONOSCOPY      ESOPHAGOGASTRODUODENOSCOPY      ESOPHAGOGASTRODUODENOSCOPY N/A 6/20/2017    Procedure: ESOPHAGOGASTRODUODENOSCOPY (EGD);   Surgeon: Talya Carlos MD;  [facility];  Service: Bariatrics    GASTRIC BYPASS LAPAROSCOPIC N/A 12/8/2020    Procedure: Robotic extensive lysis of adhesions Robotic paraesophageal hernia repair with bio a mesh Robotic Nick-en-Y gastric bypass with intraop endoscopy ;  Surgeon: Talya Carlos MD;  [facility];  Service: Bariatrics    INSERTION PROSTATE RADIATION SEED      MN LAP, ANNETTE RESTRICT PROC, LONGITUDINAL GASTRECTOMY N/A 6/20/2017    Procedure: GASTRECTOMY SLEEVE LAPAROSCOPIC;  Surgeon: Talya Carlos MD;  [facility];  Service: Bariatrics    MN LAP,DIAGNOSTIC ABDOMEN N/A 11/11/2021    Procedure: LAPAROSCOPY DIAGNOSTIC, REDUCTION OF HERNIA, REPAIR INTERNAL HERNIA, INTRA-OP EGD;  Surgeon: Flaco Welsh MD;  [facility];  Service: Bariatrics    SLEEVE GASTROPLASTY     Sammi Lowery THYROID BIOPSY  2019     Social History   Social History     Substance and Sexual Activity   Alcohol Use No     Social History     Substance and Sexual Activity   Drug Use No     Social History     Tobacco Use   Smoking Status Former Smoker    Packs/day: 0 00    Years: 0 00    Pack years: 0 00    Quit date:     Years since quittin 2   Smokeless Tobacco Never Used     Family History   Problem Relation Age of Onset    Arthritis Mother     Hypertension Mother     Sleep apnea Mother     Prostate cancer Father     Lung cancer Cousin        Meds/Allergies       Current Outpatient Medications:     albuterol (PROAIR HFA) 90 mcg/act inhaler    Ascorbic Acid (VITAMIN C PO)    BREO ELLIPTA 200-25 MCG/INH inhaler    CALCIUM CITRATE PO    cyanocobalamin (VITAMIN B-12) 1000 MCG tablet    docusate sodium (COLACE) 100 mg capsule    fenofibrate (TRICOR) 145 mg tablet    fexofenadine (ALLEGRA) 180 MG tablet    flunisolide (NASALIDE) 25 MCG/ACT (0 025%) SOLN    fluticasone (FLONASE) 50 mcg/act nasal spray    fluticasone (FLOVENT HFA) 110 MCG/ACT inhaler    hydrochlorothiazide (HYDRODIURIL) 12 5 mg tablet    loratadine (CLARITIN) 10 mg tablet    montelukast (SINGULAIR) 10 mg tablet    MULTIPLE VITAMIN PO    tamsulosin (FLOMAX) 0 4 mg    VITAMIN D PO    lisinopril (ZESTRIL) 20 mg tablet    omeprazole (PriLOSEC) 20 mg delayed release capsule    Allergies   Allergen Reactions    Pollen Extract            Objective     Blood pressure 102/70, pulse 56, resp  rate 18, height 5' 10" (1 778 m), weight 84 4 kg (186 lb), SpO2 99 %  Body mass index is 26 69 kg/m²        PHYSICAL EXAM:      General Appearance:   Alert, cooperative, no distress   HEENT:   Normocephalic, atraumatic, anicteric      Neck:  Supple, symmetrical, trachea midline   Lungs:   Clear to auscultation bilaterally; no rales, rhonchi or wheezing; respirations unlabored    Heart[de-identified]   Regular rate and rhythm; no murmur, rub, or gallop  Abdomen:   Soft, non-tender, non-distended; normal bowel sounds; no masses, no organomegaly    Genitalia:   Deferred    Rectal:   Deferred    Extremities:  No cyanosis, clubbing or edema    Pulses:  2+ and symmetric    Skin:  No jaundice, rashes, or lesions    Lymph nodes:  No palpable cervical lymphadenopathy        Lab Results:   No visits with results within 1 Day(s) from this visit     Latest known visit with results is:   Appointment on 12/09/2021   Component Date Value    WBC 12/09/2021 3 28*    RBC 12/09/2021 3 92     Hemoglobin 12/09/2021 12 0     Hematocrit 12/09/2021 36 5     MCV 12/09/2021 93     MCH 12/09/2021 30 6     MCHC 12/09/2021 32 9     RDW 12/09/2021 14 0     Platelets 51/91/0076 119*    MPV 12/09/2021 11 3     Sodium 12/09/2021 149*    Potassium 12/09/2021 3 4*    Chloride 12/09/2021 111*    CO2 12/09/2021 31     ANION GAP 12/09/2021 7     BUN 12/09/2021 20     Creatinine 12/09/2021 0 83     Glucose, Fasting 12/09/2021 86     Calcium 12/09/2021 8 8     AST 12/09/2021 19     ALT 12/09/2021 25     Alkaline Phosphatase 12/09/2021 41*    Total Protein 12/09/2021 7 2     Albumin 12/09/2021 3 9     Total Bilirubin 12/09/2021 0 68     eGFR 12/09/2021 115     Cholesterol 12/09/2021 110     Triglycerides 12/09/2021 35     HDL, Direct 12/09/2021 70     LDL Calculated 12/09/2021 33     Non-HDL-Chol (CHOL-HDL) 12/09/2021 40     TSH 3RD GENERATON 12/09/2021 0 013*    Iron 12/09/2021 71     Ferritin 12/09/2021 157     Folate 12/09/2021 >20 0*    PTH 12/09/2021 25 1     Zinc 12/09/2021 81     Vitamin A 12/09/2021 25 4     Vitamin B1, Whole Blood 12/09/2021 113 3     Vit D, 25-Hydroxy 12/09/2021 37 4     Vitamin B-12 12/09/2021 629     Free T4 12/09/2021 1 14 Radiology Results:   No results found

## 2022-03-30 ENCOUNTER — HOSPITAL ENCOUNTER (OUTPATIENT)
Dept: ULTRASOUND IMAGING | Facility: CLINIC | Age: 57
Discharge: HOME | End: 2022-03-30
Payer: COMMERCIAL

## 2022-03-30 DIAGNOSIS — R10.13 EPIGASTRIC PAIN: ICD-10-CM

## 2022-03-30 PROCEDURE — 76705 ECHO EXAM OF ABDOMEN: CPT

## 2022-04-20 ENCOUNTER — TELEPHONE (OUTPATIENT)
Dept: UROLOGY | Facility: CLINIC | Age: 57
End: 2022-04-20

## 2022-04-20 NOTE — TELEPHONE ENCOUNTER
Spoke with patient and let him know he needs to get his PSA done prior to his appointment  He will go have it done this week  The script is on file and he can walk into any [facility] lab and get it done

## 2022-04-21 DIAGNOSIS — R10.13 EPIGASTRIC PAIN: ICD-10-CM

## 2022-04-21 RX ORDER — OMEPRAZOLE 20 MG/1
CAPSULE, DELAYED RELEASE ORAL
Qty: 60 CAPSULE | Refills: 1 | Status: SHIPPED | OUTPATIENT
Start: 2022-04-21 | End: 2022-04-27

## 2022-04-22 ENCOUNTER — APPOINTMENT (OUTPATIENT)
Dept: LAB | Facility: CLINIC | Age: 57
End: 2022-04-22
Payer: COMMERCIAL

## 2022-04-22 DIAGNOSIS — C61 PROSTATE CANCER (HCC): ICD-10-CM

## 2022-04-22 DIAGNOSIS — R10.13 EPIGASTRIC PAIN: ICD-10-CM

## 2022-04-22 LAB
BUN SERPL-MCNC: 19 MG/DL (ref 5–25)
CREAT SERPL-MCNC: 1.03 MG/DL (ref 0.6–1.3)
GFR SERPL CREATININE-BSD FRML MDRD: 81 ML/MIN/1.73SQ M
PSA SERPL-MCNC: <0.1 NG/ML (ref 0–4)

## 2022-04-22 PROCEDURE — 82565 ASSAY OF CREATININE: CPT

## 2022-04-22 PROCEDURE — 84153 ASSAY OF PSA TOTAL: CPT

## 2022-04-22 PROCEDURE — 84520 ASSAY OF UREA NITROGEN: CPT

## 2022-04-22 PROCEDURE — 36415 COLL VENOUS BLD VENIPUNCTURE: CPT

## 2022-04-25 NOTE — PROGRESS NOTES
4/27/2022      Chief Complaint   Patient presents with    Prostate Cancer     Assessment and Plan    1  Prostate cancer  - Hx of Azam 7 low volume prostate cancer  - S/p external beam radiation therapy completed 1/9/2017  - PSA from 4/22/22 <0 1  - F/u in 1 year with repeat PSA     2  LUTS  - Continue Flomax  - recommend bowel regimen to prevent constipation and avoiding bladder irritants  - if any ongoing issues could consider cystoscopy and transrectal ultrasound evaluation  History of Present Illness  Michi Gaston is a 55 y o  male here for follow up evaluation of  prostate cancer and lower urinary tract symptoms  Patient was previously under care of outside urologist and diagnosed with prostate cancer in 2016 on prostate biopsy with PSA of 4 9  Pathology was reviewed at [facility] and confirmed 2 foci of Azam 7 disease  Patient elected to proceed with robotic assisted laparoscopic prostatectomy in August of 2016  Unfortunately surgery had to be aborted secondary to difficulty with ventilation while in the Trendelenburg position  He subsequently completed external beam radiation therapy on 01/09/2017  His most recent PSA is undetectable  He does utilize Flomax for lower urinary tract symptoms  He does report ongoing obstructive and irritative voiding symptoms  He is on double-dose of Flomax  Overall he is not extremely bothered by these symptoms  He does report chronic constipation issues  Review of Systems   Constitutional: Negative for chills and fever  Respiratory: Negative for shortness of breath  Cardiovascular: Negative for chest pain  Gastrointestinal: Positive for constipation  Negative for abdominal pain  Genitourinary: Positive for frequency  Negative for difficulty urinating, dysuria, flank pain, hematuria and urgency  Neurological: Negative for dizziness                    Past Medical History  Past Medical History:   Diagnosis Date    Acid reflux     Allergy to cats     Arthritis     Asthma     Back pain     Balance problems     Breathing difficulty     approx July 2016 pt was having prostate surgery and surgery not completed as he developed "severe breathing problems"    Chronic pain disorder     back and knees    Colon polyp     Constipation     CPAP (continuous positive airway pressure) dependence     Diabetes mellitus ([address] )     HISTORY    Enlarged thyroid     Exercise involving walking     3x/week on treadmill and walks dog    Headache     occas    High cholesterol     History of radiation therapy     last treatment 12/2016    Hypertension     Hypertriglyceridemia     Knee pain, bilateral     Memory loss     Muscle weakness     Obesity     Postgastrectomy malabsorption     Prostate cancer (HCC)     RA (rheumatoid arthritis) (HCC)     Risk for falls     Seasonal allergies     Shortness of breath     over weight and over activity or asthma    Sleep apnea     cpap    Unsteady gait     "knees buckle"    Use of cane as ambulatory aid     Use of cane as ambulatory aid     Wears glasses     Work related injury     "approx 10 yrs ago has affected knees and lower back"       Past Social History  Past Surgical History:   Procedure Laterality Date    COLONOSCOPY      ESOPHAGOGASTRODUODENOSCOPY      ESOPHAGOGASTRODUODENOSCOPY N/A 6/20/2017    Procedure: ESOPHAGOGASTRODUODENOSCOPY (EGD);   Surgeon: Sol Valdivia MD;  Location: [facility];  Service: Bariatrics    GASTRIC BYPASS LAPAROSCOPIC N/A 12/8/2020    Procedure: Robotic extensive lysis of adhesions Robotic paraesophageal hernia repair with bio a mesh Robotic Nick-en-Y gastric bypass with intraop endoscopy ;  Surgeon: Sol Valdivia MD;  Location: [facility];  Service: Bariatrics    INSERTION PROSTATE RADIATION SEED      MO LAP, ANNETTE RESTRICT PROC, LONGITUDINAL GASTRECTOMY N/A 6/20/2017    Procedure: GASTRECTOMY SLEEVE LAPAROSCOPIC;  Surgeon: Sol Valdivia MD;  Location: [facility]; Service: Bariatrics    AK LAP,DIAGNOSTIC ABDOMEN N/A 2021    Procedure: LAPAROSCOPY DIAGNOSTIC, REDUCTION OF HERNIA, REPAIR INTERNAL HERNIA, INTRA-OP EGD;  Surgeon: Elena Romero MD;  Location: [facility];  Service: Bariatrics    SLEEVE GASTROPLASTY      US GUIDED THYROID BIOPSY  2019     Social History     Tobacco Use   Smoking Status Former Smoker    Packs/day: 0 00    Years: 0 00    Pack years: 0 00    Quit date:     Years since quittin 3   Smokeless Tobacco Never Used       Past Family History  Family History   Problem Relation Age of Onset    Arthritis Mother     Hypertension Mother     Sleep apnea Mother     Prostate cancer Father     Lung cancer Cousin        Past Social history  Social History     Socioeconomic History    Marital status:      Spouse name: Not on file    Number of children: Not on file    Years of education: Not on file    Highest education level: Not on file   Occupational History    Not on file   Tobacco Use    Smoking status: Former Smoker     Packs/day: 0 00     Years: 0 00     Pack years: 0 00     Quit date:      Years since quittin 3    Smokeless tobacco: Never Used   Vaping Use    Vaping Use: Never used   Substance and Sexual Activity    Alcohol use: No    Drug use: No    Sexual activity: Yes   Other Topics Concern    Not on file   Social History Narrative    Not on file     Social Determinants of Health     Financial Resource Strain: Not on file   Food Insecurity: Not on file   Transportation Needs: Not on file   Physical Activity: Not on file   Stress: Not on file   Social Connections: Not on file   Intimate Partner Violence: Not on file   Housing Stability: Not on file       Current Medications  Current Outpatient Medications   Medication Sig Dispense Refill    albuterol (PROAIR HFA) 90 mcg/act inhaler Inhale 2 (two) times a day       Ascorbic Acid (VITAMIN C PO) Take 1 capsule by mouth 2 (two) times a day      BREO ELLIPTA 200-25 MCG/INH inhaler Inhale 1 puff daily  1    CALCIUM CITRATE PO Take by mouth      cyanocobalamin (VITAMIN B-12) 1000 MCG tablet Take 1,000 mcg by mouth daily      docusate sodium (COLACE) 100 mg capsule Take 100 mg by mouth every other day      fenofibrate (TRICOR) 145 mg tablet Take 145 mg by mouth daily   flunisolide (NASALIDE) 25 MCG/ACT (0 025%) SOLN into each nostril as needed       fluticasone (FLONASE) 50 mcg/act nasal spray SPRAY 2 SPRAYS INTO EACH NOSTRIL EVERY DAY      fluticasone (FLOVENT HFA) 110 MCG/ACT inhaler Inhale 2 puffs as needed       hydrochlorothiazide (HYDRODIURIL) 12 5 mg tablet Take 12 5 mg by mouth daily      lisinopril (ZESTRIL) 20 mg tablet Take 20 mg by mouth daily        loratadine (CLARITIN) 10 mg tablet as needed       montelukast (SINGULAIR) 10 mg tablet Take 10 mg by mouth daily at bedtime   MULTIPLE VITAMIN PO Take 1 tablet by mouth daily      omeprazole (PriLOSEC) 20 mg delayed release capsule TAKE 1 CAPSULE BY MOUTH TWICE A DAY 60 capsule 1    tamsulosin (FLOMAX) 0 4 mg Take 1 capsule (0 4 mg total) by mouth 2 (two) times a day 180 capsule 3    VITAMIN D PO Take by mouth      fexofenadine (ALLEGRA) 180 MG tablet Take 180 mg by mouth daily       No current facility-administered medications for this visit  Allergies  Allergies   Allergen Reactions    Pollen Extract          The following portions of the patient's history were reviewed and updated as appropriate: allergies, current medications, past medical history, past social history, past surgical history and problem list       Vitals  Vitals:    04/27/22 0909   BP: 128/70   BP Location: Left arm   Patient Position: Sitting   Cuff Size: Large   Pulse: 79   Resp: 16   SpO2: 99%   Weight: 83 6 kg (184 lb 6 4 oz)   Height: 5' 10" (1 778 m)           Physical Exam  Physical Exam  Constitutional:       Appearance: Normal appearance  HENT:      Head: Normocephalic and atraumatic  Right Ear: External ear normal       Left Ear: External ear normal    Eyes:      General: No scleral icterus  Conjunctiva/sclera: Conjunctivae normal    Cardiovascular:      Pulses: Normal pulses  Pulmonary:      Effort: Pulmonary effort is normal    Musculoskeletal:         General: Normal range of motion  Cervical back: Normal range of motion  Neurological:      General: No focal deficit present  Mental Status: He is alert and oriented to person, place, and time  Psychiatric:         Mood and Affect: Mood normal          Behavior: Behavior normal          Thought Content: Thought content normal          Judgment: Judgment normal            Results  No results found for this or any previous visit (from the past 1 hour(s)) ]  Lab Results   Component Value Date    PSA <0 1 04/22/2022    PSA 0 1 10/26/2021    PSA 0 2 11/12/2020     Lab Results   Component Value Date    CALCIUM 8 8 12/09/2021    K 3 4 (L) 12/09/2021    CO2 31 12/09/2021     (H) 12/09/2021    BUN 19 04/22/2022    CREATININE 1 03 04/22/2022     Lab Results   Component Value Date    WBC 3 28 (L) 12/09/2021    HGB 12 0 12/09/2021    HCT 36 5 12/09/2021    MCV 93 12/09/2021     (L) 12/09/2021           Orders  No orders of the defined types were placed in this encounter        Edvin Dubois PA-C

## 2022-04-27 ENCOUNTER — OFFICE VISIT (OUTPATIENT)
Dept: UROLOGY | Facility: CLINIC | Age: 57
End: 2022-04-27
Payer: COMMERCIAL

## 2022-04-27 VITALS
OXYGEN SATURATION: 99 % | WEIGHT: 184.4 LBS | DIASTOLIC BLOOD PRESSURE: 70 MMHG | BODY MASS INDEX: 26.4 KG/M2 | RESPIRATION RATE: 16 BRPM | HEART RATE: 79 BPM | SYSTOLIC BLOOD PRESSURE: 128 MMHG | HEIGHT: 70 IN

## 2022-04-27 DIAGNOSIS — C61 PROSTATE CANCER (HCC): Primary | ICD-10-CM

## 2022-04-27 DIAGNOSIS — R39.9 LOWER URINARY TRACT SYMPTOMS: ICD-10-CM

## 2022-04-27 PROCEDURE — 99213 OFFICE O/P EST LOW 20 MIN: CPT | Performed by: PHYSICIAN ASSISTANT

## 2022-04-27 RX ORDER — OMEPRAZOLE 20 MG/1
CAPSULE, DELAYED RELEASE ORAL
Qty: 60 CAPSULE | Refills: 1 | Status: SHIPPED | OUTPATIENT
Start: 2022-04-27 | End: 2022-05-30

## 2022-04-27 RX ORDER — TAMSULOSIN HYDROCHLORIDE 0.4 MG/1
0.4 CAPSULE ORAL 2 TIMES DAILY
Qty: 180 CAPSULE | Refills: 3 | Status: SHIPPED | OUTPATIENT
Start: 2022-04-27

## 2022-04-29 ENCOUNTER — HOSPITAL ENCOUNTER (OUTPATIENT)
Dept: CT IMAGING | Facility: CLINIC | Age: 57
Discharge: HOME | End: 2022-04-29
Payer: COMMERCIAL

## 2022-04-29 DIAGNOSIS — R10.13 EPIGASTRIC PAIN: ICD-10-CM

## 2022-04-29 PROCEDURE — 74177 CT ABD & PELVIS W/CONTRAST: CPT

## 2022-04-29 RX ADMIN — IOHEXOL 100 ML: 350 INJECTION, SOLUTION INTRAVENOUS at 13:39

## 2022-04-29 NOTE — NURSING NOTE
Cardiac CTA Pre Questionnaire    Reason for exam:Equivocal stress, imaging defect    Heart disease history: no    Previous CABG, angioplasty, cardiac cath, stent placement: no    Family history of heart disease: no    PPM / ICD / Recent EKG result: ekg SB LAFB, NS ST 57    Beta Blocker: none    Allergies: Pollen    Contrast Allergy / Issues: none    HTN Medication:  Yes    DM history: Yes, no longer takes meds since gastric sleeve    Kidney Disease:  Single / Transplant / Surgery / Dialysis hx / Paraproteinemia ( mult myeloma ex ) No    GFR:        81                       Date:   4/22/22    PDE5 inhibitors such as Viagra, Levitra, or Cialis:  No, denies    COPD / Asthma Hx / Inhaler Use:  Asthma, inhaler prn    Smoking Status:  Former    Limb Alerts / US PIV hx:  No    Height:            5'10                   Weight: 186  Patient Instructions:    1  If applicable, PDE5 inhibitor needs to be held x3 days prior to testing and may be resumed 24 hrs after testing to prevent a life threatening drop in BP due to nitrate given during test    2  Avoid all caffeine products 12 hrs prior to test  Includes coffee, decaf, tea, soda  3  No solid food for 3 hrs prior to test,  Increase fluids the day before and day of test unless contraindicated  4  If needed, please take anxiety medication 1 hr prior to test as instructed  5  If applicable, take beta blocker as instructed by physician  6  Do not use inhalers prior to test, test may be rescheduled if used  7  Take usual medications unless otherwise instructed by physician  8  Bring photo ID, insurance card, and prescription  Consult completed  Test/medications explained, all questions answered  No pre beta blocker needed  Allergy/medications verified  Above test instructions given  Patient verbalizes understanding of education and instructions

## 2022-05-05 ENCOUNTER — TELEPHONE (OUTPATIENT)
Dept: GASTROENTEROLOGY | Facility: CLINIC | Age: 57
End: 2022-05-05

## 2022-05-05 NOTE — TELEPHONE ENCOUNTER
Spoke to pt gave results, he was happy to hear but he want to know is there something he needs to do moving further about the kidney stones

## 2022-05-05 NOTE — TELEPHONE ENCOUNTER
He can follow up with his PCP and Urology (he sees urology)  At this time, they are not causing a problem

## 2022-05-05 NOTE — TELEPHONE ENCOUNTER
----- Message from Emil Ware PA-C sent at 5/5/2022  1:11 PM EDT -----  Please inform patient that the CT Scan showed no acute intraabdominal pathology; it did incidentally note that he has nonobstructing stones within the right kidney

## 2022-05-12 ENCOUNTER — HOSPITAL ENCOUNTER (OUTPATIENT)
Dept: CT IMAGING | Facility: HOSPITAL | Age: 57
Discharge: HOME | End: 2022-05-12

## 2022-05-30 DIAGNOSIS — R10.13 EPIGASTRIC PAIN: ICD-10-CM

## 2022-05-30 RX ORDER — OMEPRAZOLE 20 MG/1
CAPSULE, DELAYED RELEASE ORAL
Qty: 180 CAPSULE | Refills: 1 | Status: SHIPPED | OUTPATIENT
Start: 2022-05-30

## 2022-07-07 ENCOUNTER — HOSPITAL ENCOUNTER (OUTPATIENT)
Dept: CT IMAGING | Facility: HOSPITAL | Age: 57
Discharge: HOME | End: 2022-07-07
Attending: INTERNAL MEDICINE
Payer: COMMERCIAL

## 2022-07-07 VITALS — SYSTOLIC BLOOD PRESSURE: 118 MMHG | DIASTOLIC BLOOD PRESSURE: 76 MMHG | HEART RATE: 52 BPM | RESPIRATION RATE: 20 BRPM

## 2022-07-07 DIAGNOSIS — I10 PRIMARY HYPERTENSION: ICD-10-CM

## 2022-07-07 DIAGNOSIS — R06.09 DYSPNEA ON EXERTION: ICD-10-CM

## 2022-07-07 DIAGNOSIS — R07.89 CHEST TIGHTNESS: ICD-10-CM

## 2022-07-07 DIAGNOSIS — E78.2 MIXED HYPERLIPIDEMIA: ICD-10-CM

## 2022-07-07 PROCEDURE — 75574 CT ANGIO HRT W/3D IMAGE: CPT

## 2022-07-07 RX ORDER — IODIXANOL 320 MG/ML
100 INJECTION, SOLUTION INTRAVASCULAR
Status: COMPLETED | OUTPATIENT
Start: 2022-07-07 | End: 2022-07-07

## 2022-07-07 RX ORDER — NITROGLYCERIN 0.4 MG/1
0.4 TABLET SUBLINGUAL ONCE
Status: COMPLETED | OUTPATIENT
Start: 2022-07-07 | End: 2022-07-07

## 2022-07-07 RX ADMIN — NITROGLYCERIN 0.4 MG: 0.4 TABLET SUBLINGUAL at 10:33

## 2022-07-07 RX ADMIN — IODIXANOL 100 ML: 320 INJECTION, SOLUTION INTRAVASCULAR at 10:26

## 2022-07-07 NOTE — NURSING NOTE
Patient arrived for cardiac cta with son  Test/medicaitons reviewed  No beta blocker required  Pt denies PDE5 inhibitor use  See vitals flowsheet  Pt tolerated test well  Encouraged increased fluids remainder of day  Asymptomatic upon departure with son

## 2022-07-21 ENCOUNTER — TELEPHONE (OUTPATIENT)
Dept: CARDIOLOGY CLINIC | Facility: CLINIC | Age: 57
End: 2022-07-21

## 2022-07-21 NOTE — TELEPHONE ENCOUNTER
----- Message from Lisa Huff PA-C sent at 7/21/2022  2:15 PM EDT -----  Pls call CTA was good, score 0, that is the best it can be

## 2022-09-17 DIAGNOSIS — R10.13 EPIGASTRIC PAIN: ICD-10-CM

## 2022-09-17 RX ORDER — OMEPRAZOLE 20 MG/1
CAPSULE, DELAYED RELEASE ORAL
Qty: 180 CAPSULE | Refills: 1 | Status: SHIPPED | OUTPATIENT
Start: 2022-09-17

## 2023-04-20 ENCOUNTER — APPOINTMENT (OUTPATIENT)
Dept: LAB | Facility: HOSPITAL | Age: 58
End: 2023-04-20

## 2023-04-20 DIAGNOSIS — D72.819 LEUKOPENIA, UNSPECIFIED TYPE: ICD-10-CM

## 2023-04-20 DIAGNOSIS — Z98.84 H/O GASTRIC BYPASS: ICD-10-CM

## 2023-04-20 DIAGNOSIS — D69.6 THROMBOCYTOPENIA (HCC): ICD-10-CM

## 2023-04-20 LAB
CRP SERPL QL: 1.7 MG/L
ERYTHROCYTE [SEDIMENTATION RATE] IN BLOOD: 10 MM/HOUR (ref 0–19)
FERRITIN SERPL-MCNC: 45 NG/ML (ref 8–388)
FOLATE SERPL-MCNC: >20 NG/ML (ref 3.1–17.5)
HBV CORE AB SER QL: NORMAL
HBV CORE IGM SER QL: NORMAL
HBV SURFACE AG SER QL: NORMAL
HCV AB SER QL: NORMAL
IRON SATN MFR SERPL: 15 % (ref 20–50)
IRON SERPL-MCNC: 67 UG/DL (ref 65–175)
TIBC SERPL-MCNC: 436 UG/DL (ref 250–450)
VIT B12 SERPL-MCNC: 710 PG/ML (ref 100–900)

## 2023-04-21 LAB
HIV 1+2 AB+HIV1 P24 AG SERPL QL IA: NON REACTIVE
SCAN RESULT: NORMAL

## 2023-04-23 LAB — COPPER SERPL-MCNC: 87 UG/DL (ref 69–132)

## 2023-04-24 LAB — METHYLMALONATE SERPL-SCNC: 182 NMOL/L (ref 0–378)

## 2023-05-06 DIAGNOSIS — R10.13 EPIGASTRIC PAIN: ICD-10-CM

## 2023-05-08 RX ORDER — OMEPRAZOLE 20 MG/1
CAPSULE, DELAYED RELEASE ORAL
Qty: 180 CAPSULE | Refills: 1 | Status: SHIPPED | OUTPATIENT
Start: 2023-05-08

## 2023-08-01 ENCOUNTER — TELEPHONE (OUTPATIENT)
Dept: HEMATOLOGY ONCOLOGY | Facility: CLINIC | Age: 58
End: 2023-08-01

## 2023-08-01 NOTE — PROGRESS NOTES
HEMATOLOGY CLINIC NOTE    Primary Care Provider: Christelle Hemphill MD  Referring Provider:    MRN: [id number]  : 1965    Assessment / Plan:   1. Thrombocytopenia [facility])  This is a 65year old male with a history of both leukopenia and thrombocytopenia on/off present since at least 2018. WBC ranges from 2.8 to normal; PLT ranges from 110K to normal.     Most recent labs: WBC 4.5, hgb 14.3, plat 100k, diff normal. CMP unremarkable. No s/s of bleeding or constitutional symptoms. Patient underwent extensive work-up to evaluate for inflammatory d/o, HIV/Hep B, and nutritional deficiencies. Iron panel revealed Iron 142, TIBC 497, Iron sat 29%. ferritin 45. Remainder of labs were unremarkable. Flow cytometry negative. Patient has direct plat ab orderd by PCP, positive IgM direct plat ab. Given exclusions of alternative diagnosis, patient likely has chronic ITP. Currently, plat are 100k with no bleeding. No treatment indicated at this time. Patient educated on s/s of low platelets/bleeding such as petechia, purpura, gingival bleeding, epistaxis, and GI/ bleeding . Will continue to monitor labs q3m or sooner if needed. Would pursue bone marrow biopsy if he develops other cytopenias.     - CBC and differential; Standing    2. Leukopenia, resolved   3. Prostate cancer [facility]): Closely following urology. Hx as below. Last PSA 0.1 23  4. H/O gastric bypass: As above. · Discussion of decision making    I personally reviewed the following lab results, the image studies, pathology, other specialty/physicians consult notes and recommendations, and outside medical records from [address] [place] [address]. I had a lengthy discussion with the patient and shared the work-up findings.  I spent 30 minutes reviewing the records (labs, clinician notes, outside records, medical history, ordering medicine/tests/procedures, interpreting the imaging/labs previously done) and coordination of care as well as direct time with the patient today, of which greater than 50% of the time was spent in counseling and coordination of care with the patient/family. · Plan/Labs  · Recommend patient increase dietary iron with vitamin C. Provided handout on Iron rich diet. · Continue to monitor CBC-D q3m. Patient was educated on s/s to monitor for in setting of low platelets. · Will consider bone marrow biopsy if he develops other cytopenias in the future. Follow Up: 6 months     All questions were answered to the patient's satisfaction during this encounter. The patient knows the contact information for our office and knows to reach out for any relevant concerns related to this encounter. They are to call for any temperature 100.4 or higher, new symptoms including but not restricted to shaking chills, decreased appetite, nausea, vomiting, diarrhea, increased fatigue, shortness of breath or chest pain, confusion, and not feeling the strength to come to the clinic. For all other listed problems and medical diagnosis in their chart - they are managed by PCP and/or other specialists, which the patient acknowledges. Thank you very much for your consultation and making us a part of this patient's care. We are continuing to follow closely with you. Please do not hesitate to reach out to me with any additional questions or concerns. Reason for visit:       Chief Complaint   Patient presents with   • Follow-up     History of Hematology Illness:     Edgardo Reaves is a 65 y.o. male PMH of prostate cancer, obesity, headache, postsurgical malabsorption, HTN, Vitamin D deficiency, Barretts esophagus, BARRETT, and more who was seen in initial consultation for thrombocytopenia and leukopenia. At the time of presentation, he has no bleeding, petechiae, purpura. or constitutional symptoms. Denies liver cirrhosis, autoimmune conditions, HIV/hepatitis. Other than prostate cancer above, no history of cancer. He is not on many herbal supplements.   He does take senna tea sometimes for constipation. He has been low in D10, folic acid in the past due to gastric bypass. Does not drink alcohol or smoke. He is on disability. His father had prostate cancer. No other family history of cancer. Colonoscopy up-to-date. 1. History of Thrombocytopenia   2. History of Leukopenia   - leukopenia and thrombocytopenia both on/off present since at least 6/2018  - WBC ranges from 2.8 to normal; PLT ranges from 110K to normal.   - 4/2023: WBC 4.49, Hgb 14.4, MCV 93, PLT 116K, diff normal. Last CMP normal from 1/2023.  - 7/2023: WBC 4.5, Hgb 14.3, MCV 93, PLT 100K, diff normal.     3. S/P gastric sleeve in 2017 following by Gastric Bypass in 2020. 4. Prostate cancer  - Hx of Azam 7 low volume prostate cancer  - S/P external beam RT completed 1/2017  - 4/2023: last PSA 0.1. Interval History:     8/2/2023: Complains of feeling fatigue ans tired. Having knee/back pain, plans to received injections in the future. He was advised by his PCP to return to hematology clinic early based on most recent labs which revealed positive direct platelet antibody. He has no signs or symptoms of bleeding or rashes. No constitutional symptoms. Problem list:       Patient Active Problem List   Diagnosis   • Prostate cancer ([address])   • Obesity, Class II, BMI 35-39.9   • Headache   • Memory difficulty   • Postsurgical malabsorption   • Encounter for surgical aftercare following surgery of digestive system   • Hypertension   • Vitamin D insufficiency   • Ramírez's esophagus determined by biopsy   • Weight gain, abnormal   • Dysphagia   • BARRETT (obstructive sleep apnea)   • Internal hernia   • Severe obesity with body mass index (BMI) of 35.0 to 39.9 with serious comorbidity (HCC)   • Platelets decreased (HCC)       REVIEW OF SYMPTOMS:   Review of Systems   Constitutional: Negative for chills, fever and unexpected weight change. Respiratory: Negative for shortness of breath.     Cardiovascular: Negative for chest pain. Gastrointestinal: Negative for diarrhea, nausea and vomiting. Skin: Negative for rash. Hematological: Negative for adenopathy. Does not bruise/bleed easily. All other systems reviewed and are negative. PHYSICAL EXAMINATION:     Vital Signs: There were no vitals taken for this visit. There is no height or weight on file to calculate BSA. Ht Readings from Last 8 Encounters:   04/20/23 5' 9" (1.753 m)   04/19/23 5' 9" (1.753 m)   04/12/23 5' 9" (1.753 m)   04/27/22 5' 10" (1.778 m)   03/24/22 5' 10" (1.778 m)   03/23/22 5' 10" (1.778 m)   03/08/22 5' 10" (1.778 m)   12/09/21 5' 10" (1.778 m)       Wt Readings from Last 8 Encounters:   04/20/23 88.9 kg (196 lb)   04/19/23 89.4 kg (197 lb)   04/12/23 89.5 kg (197 lb 6.4 oz)   04/27/22 83.6 kg (184 lb 6.4 oz)   03/24/22 84.4 kg (186 lb)   03/23/22 83.5 kg (184 lb)   03/08/22 84.1 kg (185 lb 8 oz)   12/09/21 80.1 kg (176 lb 9.6 oz)          Physical Exam  Vitals reviewed. HENT:      Head: Normocephalic. Eyes:      General: No scleral icterus. Extraocular Movements: Extraocular movements intact. Cardiovascular:      Rate and Rhythm: Normal rate and regular rhythm. Heart sounds: Normal heart sounds. Pulmonary:      Effort: Pulmonary effort is normal.      Breath sounds: Normal breath sounds. Abdominal:      Palpations: Abdomen is soft. Tenderness: There is no abdominal tenderness. Musculoskeletal:      Cervical back: Neck supple. Skin:     Coloration: Skin is not jaundiced. Findings: No bruising or rash. Neurological:      Mental Status: He is alert. Mental status is at baseline. Reviewed historical information.       PAST MEDICAL HISTORY:    Past Medical History:   Diagnosis Date   • Acid reflux    • Allergy to cats    • Arthritis    • Asthma    • Back pain    • Balance problems    • Breathing difficulty     approx July 2016 pt was having prostate surgery and surgery not completed as he developed "severe breathing problems"   • Chronic pain disorder     back and knees   • Colon polyp    • Constipation    • CPAP (continuous positive airway pressure) dependence    • Diabetes mellitus (HCC)     HISTORY   • Enlarged thyroid    • Exercise involving walking     3x/week on treadmill and walks dog   • Headache     occas   • High cholesterol    • History of radiation therapy     last treatment 12/2016   • Hypertension    • Hypertriglyceridemia    • Knee pain, bilateral    • Memory loss    • Muscle weakness    • Obesity    • Postgastrectomy malabsorption    • Prostate cancer (HCC)    • RA (rheumatoid arthritis) (HCC)    • Risk for falls    • Seasonal allergies    • Shortness of breath     over weight and over activity or asthma   • Sleep apnea     cpap   • Unsteady gait     "knees buckle"   • Use of cane as ambulatory aid    • Use of cane as ambulatory aid    • Wears glasses    • Work related injury     "approx 10 yrs ago has affected knees and lower back"       PAST SURGICAL HISTORY:    Past Surgical History:   Procedure Laterality Date   • COLONOSCOPY     • ESOPHAGOGASTRODUODENOSCOPY     • ESOPHAGOGASTRODUODENOSCOPY N/A 6/20/2017    Procedure: ESOPHAGOGASTRODUODENOSCOPY (EGD);   Surgeon: Chadwick Adams MD;  Location: [facility];  Service: Bariatrics   • GASTRIC BYPASS LAPAROSCOPIC N/A 12/8/2020    Procedure: Robotic extensive lysis of adhesions Robotic paraesophageal hernia repair with bio a mesh Robotic Nick-en-Y gastric bypass with intraop endoscopy ;  Surgeon: Chadwick Adams MD;  Location: [facility];  Service: Bariatrics   • INSERTION PROSTATE RADIATION SEED     • GA LAPS ABD PRTM&OMENTUM DX W/WO SPEC BR/WA SPX N/A 11/11/2021    Procedure: LAPAROSCOPY DIAGNOSTIC, REDUCTION OF HERNIA, REPAIR INTERNAL HERNIA, INTRA-OP EGD;  Surgeon: Maximiliano Ferrera MD;  Location: [facility];  Service: Bariatrics   • GA LAPS [address] [facility] LONGITUDINAL GASTRECTOMY N/A 6/20/2017    Procedure: GASTRECTOMY SLEEVE LAPAROSCOPIC; Surgeon: Sammie Clifford MD;  Location: [facility];  Service: Bariatrics   • SLEEVE GASTROPLASTY     • US GUIDED THYROID BIOPSY  1/31/2019         CURRENT MEDICATIONS:     Current Outpatient Medications:   •  albuterol (PROVENTIL HFA,VENTOLIN HFA) 90 mcg/act inhaler, Inhale 2 (two) times a day , Disp: , Rfl:   •  Ascorbic Acid (VITAMIN C PO), Take 1 capsule by mouth 2 (two) times a day, Disp: , Rfl:   •  betamethasone dipropionate (DIPROSONE) 0.05 % cream, Apply 1 application. topically 2 (two) times a day To affected area, Disp: , Rfl:   •  BREO ELLIPTA 200-25 MCG/INH inhaler, Inhale 1 puff daily, Disp: , Rfl: 1  •  CALCIUM CITRATE PO, Take by mouth, Disp: , Rfl:   •  clotrimazole (LOTRIMIN) 1 % cream, Apply 1 application. topically 2 (two) times a day To affected area, Disp: , Rfl:   •  cyanocobalamin (VITAMIN B-12) 1000 MCG tablet, Take 1,000 mcg by mouth daily, Disp: , Rfl:   •  Diclofenac Sodium (VOLTAREN) 1 %, APPLY 2 G TOPICALLY TO AFFECTED AREA 4 TIMES A DAY, Disp: , Rfl:   •  docusate sodium (COLACE) 100 mg capsule, Take 100 mg by mouth every other day, Disp: , Rfl:   •  fenofibrate (TRICOR) 145 mg tablet, Take 145 mg by mouth daily. , Disp: , Rfl:   •  fexofenadine (ALLEGRA) 180 MG tablet, Take 180 mg by mouth daily, Disp: , Rfl:   •  flunisolide (NASALIDE) 25 MCG/ACT (0.025%) SOLN, into each nostril as needed , Disp: , Rfl:   •  fluticasone (FLONASE) 50 mcg/act nasal spray, SPRAY 2 SPRAYS INTO EACH NOSTRIL EVERY DAY, Disp: , Rfl:   •  fluticasone (FLOVENT HFA) 110 MCG/ACT inhaler, Inhale 2 puffs as needed , Disp: , Rfl:   •  gabapentin (NEURONTIN) 100 mg capsule, TAKE 3 CAPSULES BY MOUTH NIGHTLY, Disp: , Rfl:   •  hydrochlorothiazide (HYDRODIURIL) 25 mg tablet, , Disp: , Rfl:   •  lisinopril (ZESTRIL) 20 mg tablet, Take 20 mg by mouth daily   (Patient not taking: Reported on 4/20/2023), Disp: , Rfl:   •  loratadine (CLARITIN) 10 mg tablet, as needed , Disp: , Rfl:   •  montelukast (SINGULAIR) 10 mg tablet, Take 10 mg by mouth daily at bedtime. , Disp: , Rfl:   •  MULTIPLE VITAMIN PO, Take 1 tablet by mouth daily, Disp: , Rfl:   •  omeprazole (PriLOSEC) 20 mg delayed release capsule, TAKE 1 CAPSULE BY MOUTH TWICE A DAY, Disp: 180 capsule, Rfl: 1  •  tadalafil (CIALIS) 20 MG tablet, Take 1 tablet (20 mg total) by mouth daily as needed for erectile dysfunction, Disp: 5 tablet, Rfl: 1  •  tamsulosin (FLOMAX) 0.4 mg, Take 1 capsule (0.4 mg total) by mouth 2 (two) times a day, Disp: 180 capsule, Rfl: 3  •  VITAMIN D PO, Take by mouth, Disp: , Rfl:     Family History   Social History     Tobacco Use   • Smoking status: Former     Packs/day: 0.00     Years: 0.00     Total pack years: 0.00     Types: Cigarettes     Quit date:      Years since quittin.5   • Smokeless tobacco: Never   Vaping Use   • Vaping Use: Never used   Substance Use Topics   • Alcohol use: No   • Drug use: No   CV 93 2023    Family History   Problem Relation Age of Onset   • Arthritis Mother    • Hypertension Mother    • Sleep apnea Mother    • Prostate cancer Father    • Lung cancer Cousin     (L) 2023   sults   Component Value Date    WBC 4.49 2023    HGB 14.4 2023    HCT 42.9 2023    MCV 93 2023     (L) 2023      Component Value Date    SODIUM 149 (H) 2021    K 3.4 (L) 2021     (H) 2021    CO2 31 2021    AGAP 7 2021    BUN 19 2022    CREATININE 1.03 2022    GLUC 108 2021    GLUF 86 2021    CALCIUM 8.8 2021    AST 19 2021    ALT 25 2021    ALKPHOS 41 (L) 2021    TP 7.2 2021    TBILI 0.68 2021    EGFR 81 2022       IMAGING:  CTA cardiac  Narrative: CORONARY CT ANGIOGRAM AND CT CALCIUM SCORE - WITHOUT AND WITH IV CONTRAST    INDICATION:  R07.89: Other chest pain  R06.00: Dyspnea, unspecified  I10: Essential (primary) hypertension  E78.2: Mixed hyperlipidemia.   Patient with equivocal lexiscan mpi stress test x 2  Patient Age:  55 years  Patient Gender:  Male. COMPARISON:  None. TECHNIQUE: Noncontrast CT examination of the heart examination was performed according to a protocol designed to obtain coronary calcium score. Thin section postcontrast images of the heart were obtained according to gated coronary CT angiographic   protocol. 2D and 3D image reconstruction was performed on an independent workstation in order to perform coronary vessel analysis. Premedication was administered according to institutional protocol, as detailed in the electronic health record. Prospective ECG triggering was used. Radiation dose length product (DLP) for this visit:  461 mGy-cm . This examination, like all CT scans performed in the [facility], was performed utilizing techniques to minimize radiation dose exposure, including the use of iterative   reconstruction and automated exposure control. IV CONTRAST:  100 mL of iodixanol (VISIPAQUE)    IMAGE QUALITY:  Excellent image quality with no significant artifact present. FINDINGS:     CORONARY CALCIUM SCORE:  0    Calcium score PERCENTILE of age, race, and gender matched database participants in the Multi-Ethnic Study of Atherosclerosis (MAHAJAN) trial:     CORONARY ARTERY ANATOMY:  Coronary arteries arise in normal position. There is right coronary artery dominance. There is typical bifurcation of the left main coronary artery into left anterior descending and left circumflex coronary arteries. Short   segment of partial myocardial bridging in the mid left anterior descending coronary artery. LEFT MAIN:  No atherosclerotic plaque or vascular stenosis. LAD AND DIAGONAL BRANCHES:  No atherosclerotic plaque or vascular stenosis. LCX:  No atherosclerotic plaque or vascular stenosis. RCA:  Right coronary artery gives rise to patent posterior descending and posterolateral left ventricular branches.     No atherosclerotic plaque or vascular stenosis. CARDIAC STRUCTURES:  Valves:  Normal CT appearance of cardiac valves. Pericardium:  Normal pericardial contour without pericardial effusion, thickening, or calcifications. Myocardium:  No abnormal myocardial thinning, myocardial thickening, or myocardial calcification. GREAT VESSELS: Visualized thoracic aorta and central pulmonary arterial tree are within normal limits for the patient's age. EXTRACARDIAC FINDINGS: Partially imaged surgical changes of Nick-en-Y gastric bypass. Few scattered pneumatoceles/air cysts noted in the lungs probably a manifestation of mild centrilobular emphysematous change. Otherwise no significant extracardiac   findings identified on limited imaging of the chest.  Impression: No atherosclerotic coronary artery stenosis. CAD-RADS 0    - Degree of maximal coronary stenosis = 0%   - Documented absence of coronary artery disease. Management recommendations:  - Reassurance. Consider nonatherosclerotic causes of chest pain. Total coronary calcium score equals 0. For more useful information regarding the prognostic significance of the calcium score, please consult the calculator at the website [email or web address]://www.marta-young.org/. aspx.      Workstation performed: [id number]

## 2023-08-01 NOTE — TELEPHONE ENCOUNTER
Appointment Change  Cancel, Reschedule, Change to Virtual      Who are you speaking with? Patient   If it is not the patient, are they listed on an active communication consent form? N/A   Which provider is the appointment scheduled with? Joana Kingsley PA-C   When is the appointment scheduled? Please list date and time 10/20/23 1:00PM   At which location is the appointment scheduled to take place? [facility]   Was the appointment rescheduled or changed from an in person visit to a virtual visit? If so, please list the details of the change. 8/2/23 11:00AM   What is the reason for the appointment change? Patient states he needs to be seen sooner due to recent labs   Was [facility] transport scheduled for this visit? No   Does [facility] transport need to be scheduled for the new visit (if applicable) No   Does the patient need an infusion appointment rescheduled? No   Does the patient have an infusion appointment scheduled? If so, when? No   Is the patient undergoing chemotherapy? No   Was the no-show policy reviewed for appointments being changed with less then 24 hours of notice?  Yes

## 2023-08-02 ENCOUNTER — OFFICE VISIT (OUTPATIENT)
Dept: HEMATOLOGY ONCOLOGY | Facility: CLINIC | Age: 58
End: 2023-08-02
Payer: COMMERCIAL

## 2023-08-02 VITALS
BODY MASS INDEX: 29.18 KG/M2 | DIASTOLIC BLOOD PRESSURE: 84 MMHG | SYSTOLIC BLOOD PRESSURE: 124 MMHG | HEART RATE: 78 BPM | TEMPERATURE: 98 F | RESPIRATION RATE: 16 BRPM | HEIGHT: 69 IN | WEIGHT: 197 LBS | OXYGEN SATURATION: 98 %

## 2023-08-02 DIAGNOSIS — D72.819 LEUKOPENIA, UNSPECIFIED TYPE: ICD-10-CM

## 2023-08-02 DIAGNOSIS — D69.6 THROMBOCYTOPENIA (HCC): Primary | ICD-10-CM

## 2023-08-02 PROCEDURE — 99214 OFFICE O/P EST MOD 30 MIN: CPT | Performed by: INTERNAL MEDICINE

## 2023-08-02 RX ORDER — HYLAN G-F 20 16MG/2ML
SYRINGE (ML) INTRAARTICULAR
COMMUNITY
Start: 2023-07-19

## 2023-10-29 DIAGNOSIS — R10.13 EPIGASTRIC PAIN: ICD-10-CM

## 2023-10-30 RX ORDER — OMEPRAZOLE 20 MG/1
CAPSULE, DELAYED RELEASE ORAL
Qty: 180 CAPSULE | Refills: 1 | Status: SHIPPED | OUTPATIENT
Start: 2023-10-30

## 2024-01-30 ENCOUNTER — TELEPHONE (OUTPATIENT)
Dept: HEMATOLOGY ONCOLOGY | Facility: CLINIC | Age: 59
End: 2024-01-30

## 2024-01-30 NOTE — TELEPHONE ENCOUNTER
Lvm to let patient know to get lab done prior to appt, along with appt date and time; also call back number

## 2024-01-31 ENCOUNTER — APPOINTMENT (OUTPATIENT)
Age: 59
End: 2024-01-31
Payer: COMMERCIAL

## 2024-02-04 NOTE — PROGRESS NOTES
==[facility] [address] [address]  Hematology Ambulatory Follow-Up  Josiah Delarosa, 1965, [id number]  2/4/2024      Assessment and Plan   1. Chronic ITP (idiopathic thrombocytopenia) (HCC)  2. History of non anemic vitamin B12 deficiency  - Vitamin B12; Future  - Methylmalonic acid, serum; Future  - CBC and differential; Future  - CBC and differential; Future  - Peripheral Smear; Future    58-year-old male with history of leukopenia and thrombocytopenia intermittent since at least 2018.  Patient has had extensive workup to evaluate for inflammatory, infection and nutritional deficiencies that have been largely unrevealing.  Flow cytometry was negative.  Direct platelet antibody was positive for IgM direct platelet antibody.  Patient likely has chronic ITP.      Leukopenia has resolved.  Patient continues follow-up for monitoring of thrombocytopenia.  Most recent labs reveal platelet count of 100,000.  Remainder of CBC within normal limits.  Patient denies any signs or symptoms of bleeding or constitutional symptoms.  Patient does have history of B12 deficiency secondary to bariatric surgery-- on cyanocobalamin 2,000mcg daily. No recent B12 level.     Discussion/plan:   Continue B12 supplements. Check B12, MMA level.   Continue observation of thrombocytopenia with CBC-D every 3 months. .   Consider bone marrow biopsy if thrombocytopenia worsens or other cytopenias develop.  Patient was advised to call our office immediately if he develops unexplained fever, night sweats, weight loss, lymphadenopathy, bleeding, easy bruising or petechiae.  RTC in 6 months.    Patient voiced agreement and understanding to the above.   Patient advised to call the Hematology/Oncology office with any questions and concerns regarding the above.    Barrier(s) to care: None  The patient is able to self care.      Subjective   No chief complaint on  file.    History of present illness: Josiah Delarosa is a 58 y.o. male PMH of prostate cancer, obesity, headache, postsurgical malabsorption, HTN, Vitamin D deficiency, Barretts esophagus, BARRETT, and more who was seen in initial consultation for thrombocytopenia and leukopenia. At the time of presentation, he has no bleeding, petechiae, purpura. or constitutional symptoms. Denies liver cirrhosis, autoimmune conditions, HIV/hepatitis.  Other than prostate cancer above, no history of cancer.  He is not on many herbal supplements.  He does take senna tea sometimes for constipation.  He has been low in B12, folic acid in the past due to gastric bypass.  Does not drink alcohol or smoke. He is on disability.  His father had prostate cancer.  No other family history of cancer.  Colonoscopy up-to-date.     1. History of Thrombocytopenia   2. History of Leukopenia   - leukopenia and thrombocytopenia both on/off present since at least 6/2018  - WBC ranges from 2.8 to normal; PLT ranges from 110K to normal.   -04/2022: CT abdomen and pelvis without evidence of hepatosplenomegaly.  - 4/2023: WBC 4.49, Hgb 14.4, MCV 93, PLT 116K, diff normal. Last CMP normal from 1/2023.  HIV/hepatitis negative.  Flow cytometry normal.  B12 710.  MMA normal.  Folate greater than 20.  Iron panel normal.  ESR/CRP normal.  - 7/2023: WBC 4.5, Hgb 14.3, MCV 93, PLT 100K, diff normal.  Platelet antibody IgM detected.  IgG negative.  TSH 0.44  - 01/2024: WBC 4.89 normal diff, hemoglobin 14.8, MCV 93, platelets 100,000     3. S/P gastric sleeve in 2017 following by Gastric Bypass in 2020.      4. Prostate cancer  - Hx of Azam 7 low volume prostate cancer  - S/P external beam RT completed 1/2017  - 4/2023: last PSA 0.1.    UTD on colonoscopy, last performed 2020. Repeat in 5 years due to poplys     Interval history: Has fatigue. Started trazodone last night, having some grogginess this morning. He is on B12 tablets 2,000mcg per day.  Patient denies fever,  "chills, night sweats, unintentional weight loss, lymphadenopathy, easy bruising, hematochezia, melena or hematuria.  Denies abdominal pain, nausea or vomiting.  Reports good appetite.    Review of Systems   Constitutional:  Positive for fatigue. Negative for activity change, diaphoresis, fever and unexpected weight change.   Respiratory:  Negative for cough and shortness of breath.    Cardiovascular:  Negative for chest pain and palpitations.   Gastrointestinal:  Negative for abdominal pain, blood in stool, diarrhea, nausea and vomiting.   Genitourinary:  Negative for hematuria.   Neurological:  Negative for tremors.   Hematological:  Negative for adenopathy. Does not bruise/bleed easily.       Patient Active Problem List   Diagnosis    Prostate cancer (HCC)    Obesity, Class II, BMI 35-39.9    Headache    Memory difficulty    Postsurgical malabsorption    Encounter for surgical aftercare following surgery of digestive system    Hypertension    Vitamin D insufficiency    Ramírez's esophagus determined by biopsy    Weight gain, abnormal    Dysphagia    BARRETT (obstructive sleep apnea)    Internal hernia    Severe obesity with body mass index (BMI) of 35.0 to 39.9 with serious comorbidity (HCC)    Platelets decreased (HCC)     Past Medical History:   Diagnosis Date    Acid reflux     Allergy to cats     Arthritis     Asthma     Back pain     Balance problems     Breathing difficulty     approx July 2016 pt was having prostate surgery and surgery not completed as he developed \"severe breathing problems\"    Chronic pain disorder     back and knees    Colon polyp     Constipation     CPAP (continuous positive airway pressure) dependence     Diabetes mellitus (HCC)     HISTORY    Enlarged thyroid     Exercise involving walking     3x/week on treadmill and walks dog    Headache     occas    High cholesterol     History of radiation therapy     last treatment 12/2016    Hypertension     Hypertriglyceridemia     Knee pain, " "bilateral     Memory loss     Muscle weakness     Obesity     Postgastrectomy malabsorption     Prostate cancer (HCC)     RA (rheumatoid arthritis) (HCC)     Risk for falls     Seasonal allergies     Shortness of breath     over weight and over activity or asthma    Sleep apnea     cpap    Unsteady gait     \"knees buckle\"    Use of cane as ambulatory aid     Use of cane as ambulatory aid     Wears glasses     Work related injury     \"approx 10 yrs ago has affected knees and lower back\"     Past Surgical History:   Procedure Laterality Date    COLONOSCOPY      ESOPHAGOGASTRODUODENOSCOPY      ESOPHAGOGASTRODUODENOSCOPY N/A 6/20/2017    Procedure: ESOPHAGOGASTRODUODENOSCOPY (EGD);  Surgeon: Darius Membreno MD;  Location: [facility];  Service: Bariatrics    GASTRIC BYPASS LAPAROSCOPIC N/A 12/8/2020    Procedure: Robotic extensive lysis of adhesions Robotic paraesophageal hernia repair with bio a mesh Robotic Nick-en-Y gastric bypass with intraop endoscopy ;  Surgeon: Darius Membreno MD;  Location: [facility];  Service: Bariatrics    INSERTION PROSTATE RADIATION SEED      FL LAPS ABD PRTM&OMENTUM DX W/WO SPEC BR/WA SPX N/A 11/11/2021    Procedure: LAPAROSCOPY DIAGNOSTIC, REDUCTION OF HERNIA, REPAIR INTERNAL HERNIA, INTRA-OP EGD;  Surgeon: Forrest Urban MD;  Location: [facility];  Service: Bariatrics    FL LAPS GSTRC RSTRICTIV PX LONGITUDINAL GASTRECTOMY N/A 6/20/2017    Procedure: GASTRECTOMY SLEEVE LAPAROSCOPIC;  Surgeon: Darius Membreno MD;  Location: [facility];  Service: Bariatrics    SLEEVE GASTROPLASTY      US GUIDED THYROID BIOPSY  1/31/2019    US GUIDED THYROID BIOPSY  4/7/2022     Family History   Problem Relation Age of Onset    Arthritis Mother     Hypertension Mother     Sleep apnea Mother     Prostate cancer Father     Lung cancer Cousin      Social History     Socioeconomic History    Marital status:      Spouse name: Not on file    Number of children: Not on file    Years of education: Not on " file    Highest education level: Not on file   Occupational History    Not on file   Tobacco Use    Smoking status: Former     Current packs/day: 0.00     Types: Cigarettes     Quit date: 2009     Years since quitting: 15.1    Smokeless tobacco: Never   Vaping Use    Vaping status: Never Used   Substance and Sexual Activity    Alcohol use: No    Drug use: No    Sexual activity: Yes   Other Topics Concern    Not on file   Social History Narrative    Not on file     Social Determinants of Health     Financial Resource Strain: Patient Declined (9/25/2023)    Received from [facility]    Overall Financial Resource Strain (CARDIA)     Difficulty of Paying Living Expenses: Patient declined   Food Insecurity: Patient Declined (9/25/2023)    Received from [facility]    Hunger Vital Sign     Worried About Running Out of Food in the Last Year: Patient declined     Ran Out of Food in the Last Year: Patient declined   Transportation Needs: No Transportation Needs (9/25/2023)    Received from [facility]    PRAPARE - Transportation     Lack of Transportation (Medical): No     Lack of Transportation (Non-Medical): No   Physical Activity: Not on file   Stress: Not on file   Social Connections: Not on file   Intimate Partner Violence: Not At Risk (9/25/2023)    Received from [facility]    Humiliation, Afraid, Rape, and Kick questionnaire     Fear of Current or Ex-Partner: No     Emotionally Abused: No     Physically Abused: No     Sexually Abused: No   Housing Stability: Unknown (9/25/2023)    Received from [facility]    Housing Stability Vital Sign     Unable to Pay for Housing in the Last Year: Patient refused     Number of Places Lived in the Last Year: 1     Unstable Housing in the Last Year: Patient refused       Current Outpatient Medications:     albuterol (PROVENTIL HFA,VENTOLIN HFA) 90 mcg/act inhaler, Inhale 2 (two) times a day , Disp:  , Rfl:     Ascorbic Acid (VITAMIN C PO), Take 1 capsule by mouth 2 (two) times a day, Disp: , Rfl:     betamethasone dipropionate (DIPROSONE) 0.05 % cream, Apply 1 application. topically 2 (two) times a day To affected area, Disp: , Rfl:     BREO ELLIPTA 200-25 MCG/INH inhaler, Inhale 1 puff daily, Disp: , Rfl: 1    CALCIUM CITRATE PO, Take by mouth, Disp: , Rfl:     clotrimazole (LOTRIMIN) 1 % cream, Apply 1 application. topically 2 (two) times a day To affected area, Disp: , Rfl:     cyanocobalamin (VITAMIN B-12) 1000 MCG tablet, Take 1,000 mcg by mouth daily, Disp: , Rfl:     Diclofenac Sodium (VOLTAREN) 1 %, APPLY 2 G TOPICALLY TO AFFECTED AREA 4 TIMES A DAY, Disp: , Rfl:     docusate sodium (COLACE) 100 mg capsule, Take 100 mg by mouth every other day, Disp: , Rfl:     fenofibrate (TRICOR) 145 mg tablet, Take 145 mg by mouth daily., Disp: , Rfl:     fexofenadine (ALLEGRA) 180 MG tablet, Take 180 mg by mouth daily, Disp: , Rfl:     flunisolide (NASALIDE) 25 MCG/ACT (0.025%) SOLN, into each nostril as needed , Disp: , Rfl:     fluticasone (FLONASE) 50 mcg/act nasal spray, SPRAY 2 SPRAYS INTO EACH NOSTRIL EVERY DAY, Disp: , Rfl:     fluticasone (FLOVENT HFA) 110 MCG/ACT inhaler, Inhale 2 puffs as needed , Disp: , Rfl:     gabapentin (NEURONTIN) 100 mg capsule, TAKE 3 CAPSULES BY MOUTH NIGHTLY, Disp: , Rfl:     hydrochlorothiazide (HYDRODIURIL) 25 mg tablet, , Disp: , Rfl:     hylan (Synvisc) injection, INJECT ONE SYRINGE INTO BILATERAL KNEES ONCE A WEEK FOR 3 WEEKS, Disp: , Rfl:     lisinopril (ZESTRIL) 20 mg tablet, Take 20 mg by mouth daily   (Patient not taking: Reported on 4/20/2023), Disp: , Rfl:     loratadine (CLARITIN) 10 mg tablet, as needed , Disp: , Rfl:     montelukast (SINGULAIR) 10 mg tablet, Take 10 mg by mouth daily at bedtime., Disp: , Rfl:     MULTIPLE VITAMIN PO, Take 1 tablet by mouth daily, Disp: , Rfl:     omeprazole (PriLOSEC) 20 mg delayed release capsule, TAKE 1 CAPSULE BY MOUTH TWICE A  DAY, Disp: 180 capsule, Rfl: 1    tadalafil (CIALIS) 20 MG tablet, Take 1 tablet (20 mg total) by mouth daily as needed for erectile dysfunction, Disp: 5 tablet, Rfl: 1    tamsulosin (FLOMAX) 0.4 mg, Take 1 capsule (0.4 mg total) by mouth 2 (two) times a day, Disp: 180 capsule, Rfl: 3    VITAMIN D PO, Take by mouth, Disp: , Rfl:   Allergies   Allergen Reactions    Pollen Extract        Objective   There were no vitals taken for this visit.   Physical Exam  Vitals reviewed.   Cardiovascular:      Rate and Rhythm: Normal rate and regular rhythm.      Heart sounds: Normal heart sounds.   Pulmonary:      Effort: Pulmonary effort is normal.      Breath sounds: Normal breath sounds.   Abdominal:      Palpations: Abdomen is soft. There is no hepatomegaly or splenomegaly.      Tenderness: There is no abdominal tenderness.      Comments: No hepatosplenomegaly.   Lymphadenopathy:      Cervical: No cervical adenopathy.      Upper Body:      Right upper body: No supraclavicular or axillary adenopathy.      Left upper body: No supraclavicular or axillary adenopathy.   Skin:     Coloration: Skin is not jaundiced.      Findings: No bruising or rash.   Neurological:      Mental Status: He is alert. Mental status is at baseline.   Psychiatric:         Mood and Affect: Mood normal.         Result Review  Labs:  Ancillary Orders on 01/12/2024   Component Date Value Ref Range Status    WBC 01/31/2024 4.89  4.31 - 10.16 Thousand/uL Final    RBC 01/31/2024 4.81  3.88 - 5.62 Million/uL Final    Hemoglobin 01/31/2024 14.8  12.0 - 17.0 g/dL Final    Hematocrit 01/31/2024 44.5  36.5 - 49.3 % Final    MCV 01/31/2024 93  82 - 98 fL Final    MCH 01/31/2024 30.8  26.8 - 34.3 pg Final    MCHC 01/31/2024 33.3  31.4 - 37.4 g/dL Final    RDW 01/31/2024 13.2  11.6 - 15.1 % Final    MPV 01/31/2024 12.3  8.9 - 12.7 fL Final    Platelets 01/31/2024 100 (L)  149 - 390 Thousands/uL Final    nRBC 01/31/2024 0  /100 WBCs Final    Neutrophils Relative  01/31/2024 67  43 - 75 % Final    Immat GRANS % 01/31/2024 0  0 - 2 % Final    Lymphocytes Relative 01/31/2024 20  14 - 44 % Final    Monocytes Relative 01/31/2024 9  4 - 12 % Final    Eosinophils Relative 01/31/2024 3  0 - 6 % Final    Basophils Relative 01/31/2024 1  0 - 1 % Final    Neutrophils Absolute 01/31/2024 3.27  1.85 - 7.62 Thousands/µL Final    Immature Grans Absolute 01/31/2024 0.02  0.00 - 0.20 Thousand/uL Final    Lymphocytes Absolute 01/31/2024 0.97  0.60 - 4.47 Thousands/µL Final    Monocytes Absolute 01/31/2024 0.45  0.17 - 1.22 Thousand/µL Final    Eosinophils Absolute 01/31/2024 0.14  0.00 - 0.61 Thousand/µL Final    Basophils Absolute 01/31/2024 0.04  0.00 - 0.10 Thousands/µL Final       Imaging: Colonoscopy 10/2020  MPRESSION:  1. 2 diminutive distal transverse polyp status post excisional biopsy removal  2. Minor pancolonic diverticulosis  3. Mild melanosis  4. Prolapsed hemorrhoid      RECOMMENDATION:  Repeat in 5 years due to a personal history of colon polyps  Please note:  This report has been generated by a voice recognition software system. Therefore there may be syntax, spelling, and/or grammatical errors. Please call if you have any questions.

## 2024-02-05 ENCOUNTER — APPOINTMENT (OUTPATIENT)
Dept: LAB | Facility: HOSPITAL | Age: 59
End: 2024-02-05
Payer: COMMERCIAL

## 2024-02-05 ENCOUNTER — OFFICE VISIT (OUTPATIENT)
Dept: HEMATOLOGY ONCOLOGY | Facility: CLINIC | Age: 59
End: 2024-02-05
Payer: COMMERCIAL

## 2024-02-05 VITALS
TEMPERATURE: 98.2 F | BODY MASS INDEX: 29.4 KG/M2 | DIASTOLIC BLOOD PRESSURE: 82 MMHG | SYSTOLIC BLOOD PRESSURE: 124 MMHG | WEIGHT: 198.5 LBS | OXYGEN SATURATION: 98 % | RESPIRATION RATE: 16 BRPM | HEART RATE: 68 BPM | HEIGHT: 69 IN

## 2024-02-05 DIAGNOSIS — D69.3 CHRONIC ITP (IDIOPATHIC THROMBOCYTOPENIA) (HCC): ICD-10-CM

## 2024-02-05 DIAGNOSIS — D69.3 CHRONIC ITP (IDIOPATHIC THROMBOCYTOPENIA) (HCC): Primary | ICD-10-CM

## 2024-02-05 DIAGNOSIS — Z86.39 HISTORY OF NON ANEMIC VITAMIN B12 DEFICIENCY: ICD-10-CM

## 2024-02-05 LAB — VIT B12 SERPL-MCNC: 402 PG/ML (ref 180–914)

## 2024-02-05 PROCEDURE — 83918 ORGANIC ACIDS TOTAL QUANT: CPT

## 2024-02-05 PROCEDURE — 82607 VITAMIN B-12: CPT

## 2024-02-05 PROCEDURE — 36415 COLL VENOUS BLD VENIPUNCTURE: CPT

## 2024-02-05 PROCEDURE — 99202 OFFICE O/P NEW SF 15 MIN: CPT | Performed by: PHYSICIAN ASSISTANT

## 2024-02-05 RX ORDER — SENNOSIDES 8.6 MG
CAPSULE ORAL
COMMUNITY
Start: 2023-08-23

## 2024-02-05 RX ORDER — ROSUVASTATIN CALCIUM 5 MG/1
TABLET, COATED ORAL
COMMUNITY
Start: 2023-11-24

## 2024-02-05 RX ORDER — TRAMADOL HYDROCHLORIDE 50 MG/1
TABLET ORAL
COMMUNITY
Start: 2024-02-02

## 2024-02-06 ENCOUNTER — TELEPHONE (OUTPATIENT)
Dept: HEMATOLOGY ONCOLOGY | Facility: CLINIC | Age: 59
End: 2024-02-06

## 2024-02-06 NOTE — TELEPHONE ENCOUNTER
katie and let patient know his B12 level is acceptable, that he is to continue the B12 gummies. Let him know to have blood work repeated in April to keep a close eye on his platelets

## 2024-02-12 LAB — METHYLMALONATE SERPL-SCNC: 170 NMOL/L (ref 0–378)

## 2024-03-20 ENCOUNTER — TELEPHONE (OUTPATIENT)
Age: 59
End: 2024-03-20

## 2024-03-20 NOTE — TELEPHONE ENCOUNTER
Pt called stated that he is at [facility] and requested PSA to be faxed to [facility]     Fax# [phone]

## 2024-04-25 DIAGNOSIS — R10.13 EPIGASTRIC PAIN: ICD-10-CM

## 2024-04-25 RX ORDER — OMEPRAZOLE 20 MG/1
CAPSULE, DELAYED RELEASE ORAL
Qty: 180 CAPSULE | Refills: 1 | Status: SHIPPED | OUTPATIENT
Start: 2024-04-25

## 2024-05-28 ENCOUNTER — OFFICE VISIT (OUTPATIENT)
Dept: UROLOGY | Facility: CLINIC | Age: 59
End: 2024-05-28
Payer: COMMERCIAL

## 2024-05-28 VITALS
TEMPERATURE: 98 F | OXYGEN SATURATION: 98 % | BODY MASS INDEX: 29.62 KG/M2 | SYSTOLIC BLOOD PRESSURE: 146 MMHG | DIASTOLIC BLOOD PRESSURE: 92 MMHG | HEIGHT: 69 IN | WEIGHT: 200 LBS | HEART RATE: 64 BPM

## 2024-05-28 DIAGNOSIS — C61 PROSTATE CANCER (HCC): Primary | ICD-10-CM

## 2024-05-28 DIAGNOSIS — R39.9 LOWER URINARY TRACT SYMPTOMS (LUTS): ICD-10-CM

## 2024-05-28 PROBLEM — E08.42 DIABETIC POLYNEUROPATHY ASSOCIATED WITH DIABETES MELLITUS DUE TO UNDERLYING CONDITION (HCC): Status: ACTIVE | Noted: 2024-05-28

## 2024-05-28 LAB — POST-VOID RESIDUAL VOLUME, ML POC: 9 ML

## 2024-05-28 PROCEDURE — 51798 US URINE CAPACITY MEASURE: CPT | Performed by: PHYSICIAN ASSISTANT

## 2024-05-28 PROCEDURE — 99213 OFFICE O/P EST LOW 20 MIN: CPT | Performed by: PHYSICIAN ASSISTANT

## 2024-05-28 RX ORDER — PRAVASTATIN SODIUM 10 MG
TABLET ORAL
COMMUNITY
Start: 2024-05-08

## 2024-05-28 RX ORDER — TAMSULOSIN HYDROCHLORIDE 0.4 MG/1
0.4 CAPSULE ORAL 2 TIMES DAILY
Qty: 180 CAPSULE | Refills: 3 | Status: SHIPPED | OUTPATIENT
Start: 2024-05-28

## 2024-05-28 RX ORDER — DULAGLUTIDE 0.75 MG/.5ML
INJECTION, SOLUTION SUBCUTANEOUS
COMMUNITY
Start: 2024-05-08

## 2024-05-28 RX ORDER — CLOTRIMAZOLE AND BETAMETHASONE DIPROPIONATE 10; .64 MG/G; MG/G
1 CREAM TOPICAL 2 TIMES DAILY
COMMUNITY
Start: 2024-03-27

## 2024-05-28 RX ORDER — SUMATRIPTAN 50 MG/1
TABLET, FILM COATED ORAL
COMMUNITY
Start: 2024-04-10

## 2024-05-28 RX ORDER — LOSARTAN POTASSIUM 50 MG/1
1 TABLET ORAL DAILY
COMMUNITY
Start: 2024-05-08

## 2024-05-28 RX ORDER — POTASSIUM CHLORIDE 1500 MG/1
TABLET, EXTENDED RELEASE ORAL
COMMUNITY
Start: 2024-03-22

## 2024-05-28 RX ORDER — PREGABALIN 75 MG/1
CAPSULE ORAL
COMMUNITY

## 2024-05-28 NOTE — PROGRESS NOTES
5/28/2024      Chief Complaint   Patient presents with    Follow-up     Assessment and Plan    1. Prostate cancer  - Hx of [place] 7 low volume prostate cancer  - S/p external beam radiation therapy completed 1/9/2017  - PSA from 3/20/24 was 0.11  - F/u in 1 year with repeat PSA     2. LUTS  - Continue Flomax, rx refilled.   - PVR 9 mL   - Discussed further work-up with cystoscopy and TRUS for obstructive voiding symptoms which he defers at this time.     History of Present Illness  Josiah Delarosa is a 58 y.o. male here for follow up evaluation of  prostate cancer and LUTS.     Patient was previously under care of outside urologist and diagnosed with prostate cancer in 2016 on prostate biopsy with PSA of 4.9.  Pathology was reviewed at [facility] and confirmed 2 foci of [place]am 7 disease. Patient elected to proceed with robotic assisted laparoscopic prostatectomy in August of 2016. Unfortunately surgery had to be aborted secondary to difficulty with ventilation while in the Trendelenburg position.  He subsequently completed external beam radiation therapy on 01/09/2017.  Most recent PSA stable at 0.11.      He continues Flomax for lower urinary tract symptoms. His main complaint is weak urinary stream and intermittent difficulties urinating. He feels this has slightly worsened since coming off his diuretic. He denies any dysuria or hematuria.     Review of Systems   Constitutional:  Negative for chills and fever.   Respiratory:  Negative for shortness of breath.    Cardiovascular:  Negative for chest pain.   Gastrointestinal:  Negative for abdominal pain.   Genitourinary:  Positive for difficulty urinating. Negative for dysuria, flank pain, frequency, hematuria and urgency.   Neurological:  Negative for dizziness.           AUA SYMPTOM SCORE      Flowsheet Row Most Recent Value   AUA SYMPTOM SCORE    How often have you had a sensation of not emptying your bladder completely after you finished urinating? 3 (P)    "  How often have you had to urinate again less than two hours after you finished urinating? 3 (P)     How often have you found you stopped and started again several times when you urinate? 3 (P)     How often have you found it difficult to postpone urination? 3 (P)     How often have you had a weak urinary stream? 2 (P)     How often have you had to push or strain to begin urination? 2 (P)     How many times did you most typically get up to urinate from the time you went to bed at night until the time you got up in the morning? 2 (P)     Quality of Life: If you were to spend the rest of your life with your urinary condition just the way it is now, how would you feel about that? 3 (P)     AUA SYMPTOM SCORE 18 (P)                 Past Medical History  Past Medical History:   Diagnosis Date    Acid reflux     Allergy to cats     Arthritis     Asthma     Back pain     Balance problems     Breathing difficulty     approx July 2016 pt was having prostate surgery and surgery not completed as he developed \"severe breathing problems\"    Chronic pain disorder     back and knees    Colon polyp     Constipation     CPAP (continuous positive airway pressure) dependence     Diabetes mellitus (HCC)     HISTORY    Enlarged thyroid     Exercise involving walking     3x/week on treadmill and walks dog    Headache     occas    High cholesterol     History of radiation therapy     last treatment 12/2016    Hypertension     Hypertriglyceridemia     Knee pain, bilateral     Memory loss     Muscle weakness     Obesity     Postgastrectomy malabsorption     Prostate cancer (HCC)     RA (rheumatoid arthritis) (HCC)     Risk for falls     Seasonal allergies     Shortness of breath     over weight and over activity or asthma    Sleep apnea     cpap    Unsteady gait     \"knees buckle\"    Use of cane as ambulatory aid     Use of cane as ambulatory aid     Wears glasses     Work related injury     \"approx 10 yrs ago has affected knees and lower " "back\"       Past Social History  Past Surgical History:   Procedure Laterality Date    COLONOSCOPY      ESOPHAGOGASTRODUODENOSCOPY      ESOPHAGOGASTRODUODENOSCOPY N/A 6/20/2017    Procedure: ESOPHAGOGASTRODUODENOSCOPY (EGD);  Surgeon: Darius Membreno MD;  Location: [facility];  Service: Bariatrics    GASTRIC BYPASS LAPAROSCOPIC N/A 12/8/2020    Procedure: Robotic extensive lysis of adhesions Robotic paraesophageal hernia repair with bio a mesh Robotic Nick-en-Y gastric bypass with intraop endoscopy ;  Surgeon: Darius Membreno MD;  Location: [facility];  Service: Bariatrics    INSERTION PROSTATE RADIATION SEED      KY LAPS ABD PRTM&OMENTUM DX W/WO SPEC BR/WA SPX N/A 11/11/2021    Procedure: LAPAROSCOPY DIAGNOSTIC, REDUCTION OF HERNIA, REPAIR INTERNAL HERNIA, INTRA-OP EGD;  Surgeon: Forrest Urban MD;  Location: [facility];  Service: Bariatrics    KY LAPS GSTRC RSTRICTIV PX LONGITUDINAL GASTRECTOMY N/A 6/20/2017    Procedure: GASTRECTOMY SLEEVE LAPAROSCOPIC;  Surgeon: Darius Membreno MD;  Location: [facility];  Service: Bariatrics    SLEEVE GASTROPLASTY      US GUIDED THYROID BIOPSY  1/31/2019    US GUIDED THYROID BIOPSY  4/7/2022     Social History     Tobacco Use   Smoking Status Former    Current packs/day: 0.00    Types: Cigarettes    Quit date: 2009    Years since quitting: 15.4    Passive exposure: Past   Smokeless Tobacco Never       Past Family History  Family History   Problem Relation Age of Onset    Arthritis Mother     Hypertension Mother     Sleep apnea Mother     Prostate cancer Father     Lung cancer Cousin        Past Social history  Social History     Socioeconomic History    Marital status:      Spouse name: Not on file    Number of children: Not on file    Years of education: Not on file    Highest education level: Not on file   Occupational History    Not on file   Tobacco Use    Smoking status: Former     Current packs/day: 0.00     Types: Cigarettes     Quit date: 2009     Years since " quitting: 15.4     Passive exposure: Past    Smokeless tobacco: Never   Vaping Use    Vaping status: Never Used   Substance and Sexual Activity    Alcohol use: No    Drug use: No    Sexual activity: Yes   Other Topics Concern    Not on file   Social History Narrative    Not on file     Social Determinants of Health     Financial Resource Strain: Patient Declined (3/20/2024)    [facility]    Overall Financial Resource Strain (CARDIA)     Difficulty of Paying Living Expenses: Patient declined   Food Insecurity: Patient Declined (3/20/2024)    [facility]    Hunger Vital Sign     Worried About Running Out of Food in the Last Year: Patient declined     Ran Out of Food in the Last Year: Patient declined   Transportation Needs: Patient Declined (3/20/2024)    [facility]    PRAPARE - Transportation     Lack of Transportation (Medical): Patient declined     Lack of Transportation (Non-Medical): Patient declined   Physical Activity: Not on file   Stress: No Stress Concern Present (3/20/2024)    [facility]    [employer] [employer] - Occupational Stress Questionnaire     Feeling of Stress : Not at all   Social Connections: Feeling Socially Integrated (3/20/2024)    [facility]    OASIS : Social Isolation     How often do you feel lonely or isolated from those around you?: Never   Intimate Partner Violence: Not At Risk (3/20/2024)    [facility]    Humiliation, Afraid, Rape, and Kick questionnaire     Fear of Current or Ex-Partner: No     Emotionally Abused: No     Physically Abused: No     Sexually Abused: No   Housing Stability: Unknown  (3/20/2024)    [facility]    Housing Stability Vital Sign     Unable to Pay for Housing in the Last Year: Patient declined     Number of Places Lived in the Last Year: 1     Unstable Housing in the Last Year: No       Current Medications  Current Outpatient Medications   Medication Sig Dispense Refill    acetaminophen (TYLENOL) 650 mg CR tablet TAKE 2 TABLETS (1,300 MG TOTAL) BY MOUTH EVERY 8 (EIGHT) HOURS AS NEEDED FOR MILD PAIN (SCORE 1-3)      albuterol (PROVENTIL HFA,VENTOLIN HFA) 90 mcg/act inhaler Inhale 2 (two) times a day       Ascorbic Acid (VITAMIN C PO) Take 1 capsule by mouth 2 (two) times a day      betamethasone dipropionate (DIPROSONE) 0.05 % cream Apply 1 application. topically 2 (two) times a day To affected area      BREO ELLIPTA 200-25 MCG/INH inhaler Inhale 1 puff daily  1    CALCIUM CITRATE PO Take by mouth      clotrimazole (LOTRIMIN) 1 % cream Apply 1 application. topically 2 (two) times a day To affected area      clotrimazole-betamethasone (LOTRISONE) 1-0.05 % cream Apply 1 application. topically 2 (two) times a day      Diclofenac Sodium (VOLTAREN) 1 % APPLY 2 G TOPICALLY TO AFFECTED AREA 4 TIMES A DAY      docusate sodium (COLACE) 100 mg capsule Take 100 mg by mouth every other day      dulaglutide (Trulicity) 0.75 MG/0.5ML injection INJECT 0.5 ML (0.75 MG TOTAL) UNDER THE SKIN EVERY 7 DAYS      fexofenadine (ALLEGRA) 180 MG tablet Take 180 mg by mouth daily      flunisolide (NASALIDE) 25 MCG/ACT (0.025%) SOLN into each nostril as needed       fluticasone (FLONASE) 50 mcg/act nasal spray SPRAY 2 SPRAYS INTO EACH NOSTRIL EVERY DAY      fluticasone (FLOVENT HFA) 110 MCG/ACT inhaler Inhale 2 puffs as needed       gabapentin (NEURONTIN) 100 mg capsule TAKE 3 CAPSULES BY MOUTH NIGHTLY      hydrochlorothiazide (HYDRODIURIL) 25 mg tablet       losartan (COZAAR) 50 mg tablet Take 1 tablet by mouth daily      montelukast (SINGULAIR) 10  "mg tablet Take 10 mg by mouth daily at bedtime.      MULTIPLE VITAMIN PO Take 1 tablet by mouth daily      omeprazole (PriLOSEC) 20 mg delayed release capsule TAKE 1 CAPSULE BY MOUTH TWICE A  capsule 1    pravastatin (PRAVACHOL) 10 mg tablet TAKE 1 TABLET BY MOUTH EVERY DAY AT NIGHT      pregabalin (LYRICA) 75 mg capsule       rosuvastatin (CRESTOR) 5 mg tablet TAKE 1 TABLET BY MOUTH EVERY DAY AT NIGHT      SUMAtriptan (IMITREX) 50 mg tablet PLEASE SEE ATTACHED FOR DETAILED DIRECTIONS      tamsulosin (FLOMAX) 0.4 mg Take 1 capsule (0.4 mg total) by mouth 2 (two) times a day 180 capsule 3    VITAMIN D PO Take by mouth      fenofibrate (TRICOR) 145 mg tablet Take 145 mg by mouth daily. (Patient not taking: Reported on 2/5/2024)      hylan (Synvisc) injection INJECT ONE SYRINGE INTO BILATERAL KNEES ONCE A WEEK FOR 3 WEEKS (Patient not taking: Reported on 2/5/2024)      Klor-Con M20 20 MEQ tablet TAKE 1 TABLET (20 MEQ TOTAL) BY MOUTH 2 (TWO) TIMES A DAY FOR 3 DAYS. (Patient not taking: Reported on 5/28/2024)      loratadine (CLARITIN) 10 mg tablet as needed  (Patient not taking: Reported on 5/28/2024)       No current facility-administered medications for this visit.       Allergies  Allergies   Allergen Reactions    Levonorgestrel-Ethinyl Estrad Other (See Comments)    Pollen Extract          The following portions of the patient's history were reviewed and updated as appropriate: allergies, current medications, past medical history, past social history, past surgical history and problem list.      Vitals  Vitals:    05/28/24 1002   BP: 146/92   Pulse: 64   Temp: 98 °F (36.7 °C)   TempSrc: Temporal   SpO2: 98%   Weight: 90.7 kg (200 lb)   Height: 5' 9\" (1.753 m)           Physical Exam  Physical Exam  Constitutional:       Appearance: Normal appearance.   HENT:      Head: Normocephalic and atraumatic.      Right Ear: External ear normal.      Left Ear: External ear normal.      Nose: Nose normal.   Eyes:      " General: No scleral icterus.     Conjunctiva/sclera: Conjunctivae normal.   Cardiovascular:      Pulses: Normal pulses.   Pulmonary:      Effort: Pulmonary effort is normal.   Musculoskeletal:         General: Normal range of motion.      Cervical back: Normal range of motion.   Neurological:      General: No focal deficit present.      Mental Status: He is alert and oriented to person, place, and time.   Psychiatric:         Mood and Affect: Mood normal.         Behavior: Behavior normal.         Thought Content: Thought content normal.         Judgment: Judgment normal.           Results  Recent Results (from the past 1 hour(s))   POCT Measure PVR    Collection Time: 05/28/24 10:09 AM   Result Value Ref Range    POST-VOID RESIDUAL VOLUME, ML POC 9 mL   ]  Lab Results   Component Value Date    PSA 0.1 04/11/2023    PSA <0.1 04/22/2022    PSA 0.1 10/26/2021     Lab Results   Component Value Date    CALCIUM 9.3 05/03/2024    K 3.4 (L) 05/03/2024    CO2 28 05/03/2024     05/03/2024    BUN 15 05/03/2024    CREATININE 0.81 05/03/2024     Lab Results   Component Value Date    WBC 4.89 01/31/2024    HGB 14.8 01/31/2024    HCT 44.5 01/31/2024    MCV 93 01/31/2024     (L) 01/31/2024           Orders  Orders Placed This Encounter   Procedures    POCT Measure PVR       Rohini Marcos

## 2024-08-02 ENCOUNTER — TELEPHONE (OUTPATIENT)
Dept: HEMATOLOGY ONCOLOGY | Facility: CLINIC | Age: 59
End: 2024-08-02

## 2024-08-05 DIAGNOSIS — D69.6 THROMBOCYTOPENIA (HCC): ICD-10-CM

## 2024-08-05 DIAGNOSIS — D72.819 LEUKOPENIA, UNSPECIFIED TYPE: ICD-10-CM

## 2024-08-05 DIAGNOSIS — D69.3 CHRONIC ITP (IDIOPATHIC THROMBOCYTOPENIA) (HCC): Primary | ICD-10-CM

## 2024-08-08 ENCOUNTER — TELEPHONE (OUTPATIENT)
Age: 59
End: 2024-08-08

## 2024-08-08 NOTE — TELEPHONE ENCOUNTER
Patient was called and he states he had labs done for provider in [facility]. I unfortunately cannot see what labs were drawn so made him aware that I would follow up tomorrow with him and see if anything is still needed and I would give him a call. Pt is agreeable to to plan

## 2024-08-08 NOTE — TELEPHONE ENCOUNTER
Patient is at the [facility] lab now getting other blood work done and asking if we can fax  his lab order there so he can get it done at the same time. The fax is [phone]

## 2024-08-09 ENCOUNTER — TELEPHONE (OUTPATIENT)
Dept: HEMATOLOGY ONCOLOGY | Facility: CLINIC | Age: 59
End: 2024-08-09

## 2024-08-09 NOTE — TELEPHONE ENCOUNTER
Patient was called and confirmed with him that labs that were done at [facility] can be seen by our office and no additional labs will be needed at this time.

## 2024-08-30 ENCOUNTER — OFFICE VISIT (OUTPATIENT)
Dept: HEMATOLOGY ONCOLOGY | Facility: CLINIC | Age: 59
End: 2024-08-30
Payer: COMMERCIAL

## 2024-08-30 VITALS
WEIGHT: 197 LBS | HEART RATE: 63 BPM | RESPIRATION RATE: 16 BRPM | DIASTOLIC BLOOD PRESSURE: 80 MMHG | BODY MASS INDEX: 29.18 KG/M2 | SYSTOLIC BLOOD PRESSURE: 142 MMHG | OXYGEN SATURATION: 98 % | HEIGHT: 69 IN | TEMPERATURE: 98.2 F

## 2024-08-30 DIAGNOSIS — D69.3 CHRONIC ITP (IDIOPATHIC THROMBOCYTOPENIA) (HCC): Primary | ICD-10-CM

## 2024-08-30 PROCEDURE — 99212 OFFICE O/P EST SF 10 MIN: CPT | Performed by: PHYSICIAN ASSISTANT

## 2024-08-30 RX ORDER — OMEPRAZOLE 40 MG/1
40 CAPSULE, DELAYED RELEASE ORAL 2 TIMES DAILY
COMMUNITY
Start: 2024-08-08

## 2024-08-30 RX ORDER — LOSARTAN POTASSIUM 100 MG/1
100 TABLET ORAL DAILY
COMMUNITY
Start: 2024-07-26

## 2024-08-30 RX ORDER — DULAGLUTIDE 1.5 MG/.5ML
INJECTION, SOLUTION SUBCUTANEOUS
COMMUNITY
Start: 2024-06-26

## 2024-08-30 RX ORDER — AMLODIPINE BESYLATE 10 MG/1
10 TABLET ORAL DAILY
COMMUNITY
Start: 2024-08-26

## 2024-08-30 RX ORDER — FLUTICASONE FUROATE AND VILANTEROL TRIFENATATE 100; 25 UG/1; UG/1
1 POWDER RESPIRATORY (INHALATION) DAILY
COMMUNITY
Start: 2024-06-13

## 2024-08-30 RX ORDER — DULAGLUTIDE 3 MG/.5ML
INJECTION, SOLUTION SUBCUTANEOUS
COMMUNITY
Start: 2024-07-29

## 2024-08-30 NOTE — PROGRESS NOTES
[facility]AT[facility] [address] [address]  Hematology Ambulatory Follow-Up  Josiah Delarosa, 1965, [id number]  8/30/2024      Assessment and Plan   1. Chronic ITP (idiopathic thrombocytopenia) (HCC)    59-year-old male with history of leukopenia and thrombocytopenia intermittent since at least 2018 with PLT ranging from 110-180,000.  Patient has had extensive workup to evaluate for inflammatory, infection and nutritional deficiencies that have been largely unrevealing.  Flow cytometry was negative.  Direct platelet antibody was positive for IgM direct platelet antibody. Never had BMBx.  Patient likely has chronic ITP.       Leukopenia has resolved.  Patient continues follow-up for monitoring of thrombocytopenia.  Most recent labs 08/2024 reveal platelet count of 128,000.  Remainder of CBC unremarkable.  Patient denies any signs or symptoms of bleeding or constitutional symptoms.  Patient does have history of B12 deficiency secondary to bariatric surgery-- on cyanocobalamin 2,000mcg daily. Last B12 normal.  He is not having any bleeding.     Discussion/plan  Patient likely has a chronic immune mediated thrombocytopenia.  He has never had bone marrow biopsy and he does not wish to pursue invasive testing at this time.  Patient was offered continue observation by our clinic versus his PCP and he is in favor of the latter.  See now his platelet count has been stable for the past 6 years I think that this is reasonable. Recommend CBCD at least q6m. Patient should be referred back to our clinic if he develops any persistent thrombocytopenia less than 100,000, development of other cytopenias, petechia/purpura, constitutional symptoms or any bleeding complications.   Communication will be sent to PCP  RTC prn     Patient voiced agreement and understanding to the above.   Patient advised to call the Hematology/Oncology office with any questions  and concerns regarding the above.    Barrier(s) to care: None  The patient is able to self care.    Sherrill Dye PA-C   Medical Oncology/Hematology  [facility]    Subjective     Chief Complaint   Patient presents with    Follow-up       History of present illness:   Josiah Delarosa is a 58 y.o. male PMH of prostate cancer, obesity, headache, postsurgical malabsorption, HTN, Vitamin D deficiency, Barretts esophagus, BARRETT, and more who was seen in initial consultation for thrombocytopenia and leukopenia. At the time of presentation, he has no bleeding, petechiae, purpura. or constitutional symptoms. Denies liver cirrhosis, autoimmune conditions, HIV/hepatitis.  Other than prostate cancer above, no history of cancer.  He is not on many herbal supplements.  He does take senna tea sometimes for constipation.  He has been low in B12, folic acid in the past due to gastric bypass.  Does not drink alcohol or smoke. He is on disability.  His father had prostate cancer.  No other family history of cancer.  Colonoscopy up-to-date.     1. History of Thrombocytopenia   2. History of Leukopenia   - leukopenia and thrombocytopenia both on/off present since at least 6/2018  - WBC ranges from 2.8 to normal; PLT ranges from 110K to normal.   -04/2022: CT abdomen and pelvis without evidence of hepatosplenomegaly.  - 4/2023: WBC 4.49, Hgb 14.4, MCV 93, PLT 116K, diff normal. Last CMP normal from 1/2023.  HIV/hepatitis negative.  Flow cytometry normal.  B12 710.  MMA normal.  Folate greater than 20.  Iron panel normal.  ESR/CRP normal.  - 7/2023: WBC 4.5, Hgb 14.3, MCV 93, PLT 100K, diff normal.  Platelet antibody IgM detected.  IgG negative.  TSH 0.44  - 01/2024: WBC 4.89 normal diff, hemoglobin 14.8, MCV 93, platelets 100,000. Has fatigue. Started trazodone last night, having some grogginess this morning. He is on B12 tablets 2,000mcg per day.  Patient denies fever, chills, night sweats, unintentional  "weight loss, lymphadenopathy, easy bruising, hematochezia, melena or hematuria.  Denies abdominal pain, nausea or vomiting.  Reports good appetite.     3. S/P gastric sleeve in 2017 following by Gastric Bypass in 2020.      4. Prostate cancer  - Hx of [place] 7 low volume prostate cancer  - S/P external beam RT completed 1/2017  - 4/2023: last PSA 0.1.     UTD on colonoscopy, last performed 2020. Repeat in 5 years due to poplys        08/30/24 :  Lab Results   Component Value Date    IRON 142 07/12/2023    TIBC 497 (H) 07/12/2023    FERRITIN 45 04/20/2023     Lab Results   Component Value Date    WBC 4.89 01/31/2024    HGB 14.8 01/31/2024    HCT 44.5 01/31/2024    MCV 93 01/31/2024     (L) 01/31/2024       Interval history: No acute complaints.  Feeling well.  Denies any bleeding, night sweats, fevers, unintentional weight loss, lymphadenopathy or significant fatigue.  No new rashes.    Review of Systems   All other systems reviewed and are negative.      Patient Active Problem List   Diagnosis    Prostate cancer (HCC)    Obesity, Class II, BMI 35-39.9    Headache    Memory difficulty    Postsurgical malabsorption    Encounter for surgical aftercare following surgery of digestive system    Hypertension    Vitamin D insufficiency    Ramírez's esophagus determined by biopsy    Weight gain, abnormal    Dysphagia    BARRETT (obstructive sleep apnea)    Internal hernia    Severe obesity with body mass index (BMI) of 35.0 to 39.9 with serious comorbidity (HCC)    Platelets decreased (HCC)    Diabetic polyneuropathy associated with diabetes mellitus due to underlying condition (HCC)     Past Medical History:   Diagnosis Date    Acid reflux     Allergy to cats     Arthritis     Asthma     Back pain     Balance problems     Breathing difficulty     approx July 2016 pt was having prostate surgery and surgery not completed as he developed \"severe breathing problems\"    Chronic pain disorder     back and knees    Colon polyp " "    Constipation     CPAP (continuous positive airway pressure) dependence     Diabetes mellitus (HCC)     HISTORY    Enlarged thyroid     Exercise involving walking     3x/week on treadmill and walks dog    Headache     occas    High cholesterol     History of radiation therapy     last treatment 12/2016    Hypertension     Hypertriglyceridemia     Knee pain, bilateral     Memory loss     Muscle weakness     Obesity     Postgastrectomy malabsorption     Prostate cancer (HCC)     RA (rheumatoid arthritis) (HCC)     Risk for falls     Seasonal allergies     Shortness of breath     over weight and over activity or asthma    Sleep apnea     cpap    Unsteady gait     \"knees buckle\"    Use of cane as ambulatory aid     Use of cane as ambulatory aid     Wears glasses     Work related injury     \"approx 10 yrs ago has affected knees and lower back\"     Past Surgical History:   Procedure Laterality Date    COLONOSCOPY      ESOPHAGOGASTRODUODENOSCOPY      ESOPHAGOGASTRODUODENOSCOPY N/A 6/20/2017    Procedure: ESOPHAGOGASTRODUODENOSCOPY (EGD);  Surgeon: Darius Membreno MD;  Location: [facility];  Service: Bariatrics    GASTRIC BYPASS LAPAROSCOPIC N/A 12/8/2020    Procedure: Robotic extensive lysis of adhesions Robotic paraesophageal hernia repair with bio a mesh Robotic Nick-en-Y gastric bypass with intraop endoscopy ;  Surgeon: Darius Membreno MD;  Location: [facility];  Service: Bariatrics    INSERTION PROSTATE RADIATION SEED      TN LAPS ABD PRTM&OMENTUM DX W/WO SPEC BR/WA SPX N/A 11/11/2021    Procedure: LAPAROSCOPY DIAGNOSTIC, REDUCTION OF HERNIA, REPAIR INTERNAL HERNIA, INTRA-OP EGD;  Surgeon: Forrest Urban MD;  Location: [facility];  Service: Bariatrics    TN LAPS GSTRC RSTRICTIV PX LONGITUDINAL GASTRECTOMY N/A 6/20/2017    Procedure: GASTRECTOMY SLEEVE LAPAROSCOPIC;  Surgeon: Darius Membreno MD;  Location: [facility];  Service: Bariatrics    SLEEVE GASTROPLASTY      US GUIDED THYROID BIOPSY  1/31/2019    US " GUIDED THYROID BIOPSY  4/7/2022     Family History   Problem Relation Age of Onset    Arthritis Mother     Hypertension Mother     Sleep apnea Mother     Prostate cancer Father     Lung cancer Cousin      Social History     Socioeconomic History    Marital status:      Spouse name: None    Number of children: None    Years of education: None    Highest education level: None   Occupational History    None   Tobacco Use    Smoking status: Former     Current packs/day: 0.00     Types: Cigarettes     Quit date: 2009     Years since quitting: 15.6     Passive exposure: Past    Smokeless tobacco: Never   Vaping Use    Vaping status: Never Used   Substance and Sexual Activity    Alcohol use: No    Drug use: No    Sexual activity: Yes   Other Topics Concern    None   Social History Narrative    None     Social Determinants of Health     Financial Resource Strain: Patient Declined (3/20/2024)    [facility]    Overall Financial Resource Strain (CARDIA)     Difficulty of Paying Living Expenses: Patient declined   Food Insecurity: Patient Declined (3/20/2024)    [facility]    Hunger Vital Sign     Worried About Running Out of Food in the Last Year: Patient declined     Ran Out of Food in the Last Year: Patient declined   Transportation Needs: Patient Declined (3/20/2024)    [facility]    PRAPARE - Transportation     Lack of Transportation (Medical): Patient declined     Lack of Transportation (Non-Medical): Patient declined   Physical Activity: Not on file   Stress: No Stress Concern Present (3/20/2024)    [facility] from [facility]    [employer] - Occupational Stress Questionnaire     Feeling of Stress : Not at all   Social Connections: Unknown (6/18/2024)     Received from [employer]     How often do you feel lonely or isolated from those around you? (Adult - for ages 18 years and over): Not on file   Intimate Partner Violence: Not At Risk (3/20/2024)    [facility]    Humiliation, Afraid, Rape, and Kick questionnaire     Fear of Current or Ex-Partner: No     Emotionally Abused: No     Physically Abused: No     Sexually Abused: No   Housing Stability: Unknown (3/20/2024)    [facility]    Housing Stability Vital Sign     Unable to Pay for Housing in the Last Year: Patient declined     Number of Places Lived in the Last Year: 1     Unstable Housing in the Last Year: No       Current Outpatient Medications:     acetaminophen (TYLENOL) 650 mg CR tablet, TAKE 2 TABLETS (1,300 MG TOTAL) BY MOUTH EVERY 8 (EIGHT) HOURS AS NEEDED FOR MILD PAIN (SCORE 1-3), Disp: , Rfl:     albuterol (PROVENTIL HFA,VENTOLIN HFA) 90 mcg/act inhaler, Inhale 2 (two) times a day , Disp: , Rfl:     amLODIPine (NORVASC) 10 mg tablet, Take 10 mg by mouth daily, Disp: , Rfl:     Ascorbic Acid (VITAMIN C PO), Take 1 capsule by mouth 2 (two) times a day, Disp: , Rfl:     betamethasone dipropionate (DIPROSONE) 0.05 % cream, Apply 1 application. topically 2 (two) times a day To affected area, Disp: , Rfl:     BREO ELLIPTA 200-25 MCG/INH inhaler, Inhale 1 puff daily, Disp: , Rfl: 1    CALCIUM CITRATE PO, Take by mouth, Disp: , Rfl:     clotrimazole (LOTRIMIN) 1 % cream, Apply 1 application. topically 2 (two) times a day To affected area, Disp: , Rfl:     clotrimazole-betamethasone (LOTRISONE) 1-0.05 % cream, Apply 1 application. topically 2 (two) times a day, Disp: , Rfl:     Diclofenac Sodium (VOLTAREN) 1 %, APPLY 2 G TOPICALLY TO AFFECTED AREA 4 TIMES A DAY, Disp: , Rfl:     docusate sodium (COLACE) 100 mg capsule, Take 100 mg by mouth every other day, Disp: , Rfl:      dulaglutide (Trulicity) 0.75 MG/0.5ML injection, INJECT 0.5 ML (0.75 MG TOTAL) UNDER THE SKIN EVERY 7 DAYS, Disp: , Rfl:     flunisolide (NASALIDE) 25 MCG/ACT (0.025%) SOLN, into each nostril as needed , Disp: , Rfl:     fluticasone (FLONASE) 50 mcg/act nasal spray, SPRAY 2 SPRAYS INTO EACH NOSTRIL EVERY DAY, Disp: , Rfl:     fluticasone (FLOVENT HFA) 110 MCG/ACT inhaler, Inhale 2 puffs as needed , Disp: , Rfl:     gabapentin (NEURONTIN) 100 mg capsule, TAKE 3 CAPSULES BY MOUTH NIGHTLY, Disp: , Rfl:     loratadine (CLARITIN) 10 mg tablet, as needed, Disp: , Rfl:     losartan (COZAAR) 100 MG tablet, Take 100 mg by mouth daily, Disp: , Rfl:     montelukast (SINGULAIR) 10 mg tablet, Take 10 mg by mouth daily at bedtime., Disp: , Rfl:     MULTIPLE VITAMIN PO, Take 1 tablet by mouth daily, Disp: , Rfl:     omeprazole (PriLOSEC) 40 MG capsule, Take 40 mg by mouth 2 (two) times a day, Disp: , Rfl:     pravastatin (PRAVACHOL) 10 mg tablet, TAKE 1 TABLET BY MOUTH EVERY DAY AT NIGHT, Disp: , Rfl:     rosuvastatin (CRESTOR) 5 mg tablet, TAKE 1 TABLET BY MOUTH EVERY DAY AT NIGHT, Disp: , Rfl:     SUMAtriptan (IMITREX) 50 mg tablet, PLEASE SEE ATTACHED FOR DETAILED DIRECTIONS, Disp: , Rfl:     tamsulosin (FLOMAX) 0.4 mg, Take 1 capsule (0.4 mg total) by mouth 2 (two) times a day, Disp: 180 capsule, Rfl: 3    Trulicity 1.5 MG/0.5ML injection, INJECT 1.5 MG UNDER THE SKIN EVERY 7 DAYS., Disp: , Rfl:     VITAMIN D PO, Take by mouth, Disp: , Rfl:     Breo Ellipta 100-25 MCG/ACT inhaler, Inhale 1 puff daily (Patient not taking: Reported on 8/30/2024), Disp: , Rfl:     fenofibrate (TRICOR) 145 mg tablet, Take 145 mg by mouth daily. (Patient not taking: Reported on 2/5/2024), Disp: , Rfl:     fexofenadine (ALLEGRA) 180 MG tablet, Take 180 mg by mouth daily, Disp: , Rfl:     hydrochlorothiazide (HYDRODIURIL) 25 mg tablet, , Disp: , Rfl:     hylan (Synvisc) injection, INJECT ONE SYRINGE INTO BILATERAL KNEES ONCE A WEEK FOR 3 WEEKS  "(Patient not taking: Reported on 2/5/2024), Disp: , Rfl:     Klor-Con M20 20 MEQ tablet, TAKE 1 TABLET (20 MEQ TOTAL) BY MOUTH 2 (TWO) TIMES A DAY FOR 3 DAYS. (Patient not taking: Reported on 5/28/2024), Disp: , Rfl:     losartan (COZAAR) 50 mg tablet, Take 1 tablet by mouth daily (Patient not taking: Reported on 8/30/2024), Disp: , Rfl:     omeprazole (PriLOSEC) 20 mg delayed release capsule, TAKE 1 CAPSULE BY MOUTH TWICE A DAY (Patient not taking: Reported on 8/30/2024), Disp: 180 capsule, Rfl: 1    pregabalin (LYRICA) 75 mg capsule, , Disp: , Rfl:     Trulicity 3 MG/0.5ML injection, INJECT 3 MG UNDER THE SKIN EVERY 7 DAYS. (Patient not taking: Reported on 8/30/2024), Disp: , Rfl:   Allergies   Allergen Reactions    Levonorgestrel-Ethinyl Estrad Other (See Comments)    Pollen Extract        Objective   /80 (BP Location: Left arm, Patient Position: Sitting, Cuff Size: Standard)   Pulse 63   Temp 98.2 °F (36.8 °C) (Temporal)   Resp 16   Ht 5' 9\" (1.753 m)   Wt 89.4 kg (197 lb)   SpO2 98%   BMI 29.09 kg/m²    Physical Exam  Vitals reviewed.   HENT:      Head: Normocephalic.   Cardiovascular:      Rate and Rhythm: Normal rate and regular rhythm.   Pulmonary:      Effort: Pulmonary effort is normal.      Breath sounds: Normal breath sounds.   Abdominal:      Palpations: Abdomen is soft. There is no hepatomegaly or splenomegaly.      Tenderness: There is no abdominal tenderness.   Musculoskeletal:      Cervical back: Neck supple.   Lymphadenopathy:      Cervical: No cervical adenopathy.      Upper Body:      Right upper body: No supraclavicular or axillary adenopathy.      Left upper body: No supraclavicular or axillary adenopathy.   Skin:     Findings: No rash.   Neurological:      Mental Status: He is alert.         Result Review  Labs:  No visits with results within 30 Day(s) from this visit.   Latest known visit with results is:   Office Visit on 05/28/2024   Component Date Value Ref Range Status    " POST-VOID RESIDUAL VOLUME, ML POC 05/28/2024 9  mL Final       Imaging:   I reviewed relevant imaging    Please note:  This report has been generated by a voice recognition software system. Therefore there may be syntax, spelling, and/or grammatical errors. Please call if you have any questions.

## 2025-05-02 ENCOUNTER — TELEPHONE (OUTPATIENT)
Age: 60
End: 2025-05-02

## 2025-05-02 NOTE — TELEPHONE ENCOUNTER
[facility], provided me the fax number [phone].

## 2025-05-02 NOTE — TELEPHONE ENCOUNTER
Pt calling in regards to order for PSA.     Pt asking if order can be faxed to [facility] to have completed.     Order faxed to [phone] as requested.

## 2025-05-05 ENCOUNTER — TELEPHONE (OUTPATIENT)
Dept: BARIATRICS | Facility: CLINIC | Age: 60
End: 2025-05-05

## 2025-05-05 NOTE — TELEPHONE ENCOUNTER
Hi, we are trying to contact you to schedule a 4 year follow up with Weight Management. Please call us at [phone] to schedule.  Thank you!

## 2025-05-22 ENCOUNTER — TELEPHONE (OUTPATIENT)
Dept: UROLOGY | Facility: CLINIC | Age: 60
End: 2025-05-22

## 2025-05-22 NOTE — TELEPHONE ENCOUNTER
Called and spoke to the PT. PSA is needed PTV with Rohini STUART on 5/28/25 However unable to find PSA in PT chart and per PT PT did PSA in [place]. Let the PT know can print out PSA and bring to appt and the Office can scan results in the PT chart just in case it doesn't update. PT verbalized understanding.

## 2025-05-27 NOTE — ASSESSMENT & PLAN NOTE
- Hx of [place] 7 low volume prostate cancer   - S/p external beam radiation therapy completed 1/9/2017   - PSA stable at 0.13 (5/2/25)  - follow up in 1 year with repeat PSA  Orders:    POCT Measure PVR    POCT Measure PVR    PSA Total, Diagnostic; Future

## 2025-05-27 NOTE — PROGRESS NOTES
Name: Josiah Delarosa      : 1965      MRN: [id number]  Encounter Provider: Rohini Rodríguez PA-C  Encounter Date: 2025   Encounter department: [facility] UROLOGY [place]  :  Assessment & Plan  Prostate cancer (HCC)  - Hx of [place] 7 low volume prostate cancer   - S/p external beam radiation therapy completed 2017   - PSA stable at 0.13 (25)  - follow up in 1 year with repeat PSA  Orders:    POCT Measure PVR    POCT Measure PVR    PSA Total, Diagnostic; Future    Lower urinary tract symptoms (LUTS)  - Well managed on Flomax  - PVR 9 mL   - UA micro from 25 negative  Orders:    tamsulosin (FLOMAX) 0.4 mg; Take 1 capsule (0.4 mg total) by mouth 2 (two) times a day    Diabetic polyneuropathy associated with diabetes mellitus due to underlying condition ([facility])    Lab Results   Component Value Date    HGBA1C 5.1 2025                History of Present Illness   Josiah Delarosa is a 59 y.o. male who presents for follow up of prostate cancer and LUTS.     Patient was previously under care of outside urologist and diagnosed with prostate cancer in  on prostate biopsy with PSA of 4.9.  Pathology was reviewed at [facility] and confirmed 2 foci of [place] 7 disease. Patient elected to proceed with robotic assisted laparoscopic prostatectomy in 2016. Unfortunately surgery had to be aborted secondary to difficulty with ventilation while in the Trendelenburg position.  He subsequently completed external beam radiation therapy on 2017. He has been free of any disease recurrence since that time. He continues Flomax for lower urinary tract symptoms. Denies any new complaints or issues.       AUA SYMPTOM SCORE      Flowsheet Row Most Recent Value   AUA SYMPTOM SCORE    How often have you had a sensation of not emptying your bladder completely after you finished urinating? 1 (P)     How often have you had to urinate again less than two hours after you finished urinating?  1 (P)     How often have you found you stopped and started again several times when you urinate? 1 (P)     How often have you found it difficult to postpone urination? 1 (P)     How often have you had a weak urinary stream? 1 (P)     How often have you had to push or strain to begin urination? 0 (P)     How many times did you most typically get up to urinate from the time you went to bed at night until the time you got up in the morning? 2 (P)     Quality of Life: If you were to spend the rest of your life with your urinary condition just the way it is now, how would you feel about that? 2 (P)     AUA SYMPTOM SCORE 7 (P)            Review of Systems   Constitutional:  Negative for chills and fever.   Respiratory:  Negative for shortness of breath.    Cardiovascular:  Negative for chest pain.   Gastrointestinal:  Negative for abdominal pain.   Genitourinary:  Negative for difficulty urinating, dysuria, flank pain, frequency, hematuria and urgency.   Neurological:  Negative for dizziness.        Objective   There were no vitals taken for this visit.    Physical Exam  Constitutional:       Appearance: Normal appearance.   HENT:      Head: Normocephalic and atraumatic.      Right Ear: External ear normal.      Left Ear: External ear normal.      Nose: Nose normal.     Eyes:      General: No scleral icterus.     Conjunctiva/sclera: Conjunctivae normal.       Cardiovascular:      Pulses: Normal pulses.   Pulmonary:      Effort: Pulmonary effort is normal.     Musculoskeletal:         General: Normal range of motion.      Cervical back: Normal range of motion.     Neurological:      General: No focal deficit present.      Mental Status: He is alert and oriented to person, place, and time.     Psychiatric:         Mood and Affect: Mood normal.         Behavior: Behavior normal.         Thought Content: Thought content normal.         Judgment: Judgment normal.          Results   Lab Results   Component Value Date    PSA 0.1  04/11/2023    PSA <0.1 04/22/2022    PSA 0.1 10/26/2021     Lab Results   Component Value Date    CALCIUM 8.5 05/16/2025    K 3.1 (L) 05/16/2025    CO2 31 05/16/2025     05/16/2025    BUN 11 05/16/2025    CREATININE 0.7 05/16/2025     Lab Results   Component Value Date    WBC 4.89 01/31/2024    HGB 14 11/13/2024    HCT 41.2 11/13/2024    MCV 93 01/31/2024     (L) 01/31/2024       Office Urine Dip  No results found for this or any previous visit (from the past hour).

## 2025-05-28 ENCOUNTER — OFFICE VISIT (OUTPATIENT)
Dept: UROLOGY | Facility: CLINIC | Age: 60
End: 2025-05-28
Payer: COMMERCIAL

## 2025-05-28 VITALS
BODY MASS INDEX: 29.03 KG/M2 | HEIGHT: 69 IN | WEIGHT: 196 LBS | HEART RATE: 77 BPM | OXYGEN SATURATION: 98 % | DIASTOLIC BLOOD PRESSURE: 74 MMHG | TEMPERATURE: 98 F | SYSTOLIC BLOOD PRESSURE: 122 MMHG

## 2025-05-28 DIAGNOSIS — C61 PROSTATE CANCER (HCC): Primary | ICD-10-CM

## 2025-05-28 DIAGNOSIS — R39.9 LOWER URINARY TRACT SYMPTOMS (LUTS): ICD-10-CM

## 2025-05-28 DIAGNOSIS — E08.42 DIABETIC POLYNEUROPATHY ASSOCIATED WITH DIABETES MELLITUS DUE TO UNDERLYING CONDITION (HCC): ICD-10-CM

## 2025-05-28 PROBLEM — E66.01 SEVERE OBESITY WITH BODY MASS INDEX (BMI) OF 35.0 TO 39.9 WITH SERIOUS COMORBIDITY (HCC): Status: RESOLVED | Noted: 2022-03-24 | Resolved: 2025-05-28

## 2025-05-28 LAB
POST-VOID RESIDUAL VOLUME, ML POC: 205 ML
POST-VOID RESIDUAL VOLUME, ML POC: 9 ML

## 2025-05-28 PROCEDURE — 99213 OFFICE O/P EST LOW 20 MIN: CPT | Performed by: PHYSICIAN ASSISTANT

## 2025-05-28 PROCEDURE — 51798 US URINE CAPACITY MEASURE: CPT | Performed by: PHYSICIAN ASSISTANT

## 2025-05-28 RX ORDER — FAMOTIDINE 20 MG/1
20 TABLET, FILM COATED ORAL 2 TIMES DAILY
COMMUNITY
Start: 2024-11-20

## 2025-05-28 RX ORDER — CIPROFLOXACIN 500 MG/1
TABLET, FILM COATED ORAL
COMMUNITY
End: 2025-05-28

## 2025-05-28 RX ORDER — LINACLOTIDE 145 UG/1
145 CAPSULE, GELATIN COATED ORAL DAILY
COMMUNITY
End: 2025-05-28

## 2025-05-28 RX ORDER — CETIRIZINE HYDROCHLORIDE 5 MG/1
TABLET ORAL
COMMUNITY
Start: 2024-12-23

## 2025-05-28 RX ORDER — TAMSULOSIN HYDROCHLORIDE 0.4 MG/1
0.4 CAPSULE ORAL 2 TIMES DAILY
Qty: 180 CAPSULE | Refills: 3 | Status: SHIPPED | OUTPATIENT
Start: 2025-05-28

## 2025-05-28 RX ORDER — PREDNISONE 10 MG/1
10 TABLET ORAL DAILY
COMMUNITY
Start: 2025-03-25 | End: 2025-05-28

## 2025-07-28 ENCOUNTER — TELEPHONE (OUTPATIENT)
Age: 60
End: 2025-07-28

## 2025-08-05 ENCOUNTER — PREP FOR PROCEDURE (OUTPATIENT)
Age: 60
End: 2025-08-05

## 2025-08-05 DIAGNOSIS — Z86.0100 HISTORY OF COLON POLYPS: Primary | ICD-10-CM

## (undated) DEVICE — SCD SEQUENTIAL COMPRESSION COMFORT SLEEVE MEDIUM KNEE LENGTH: Brand: KENDALL SCD

## (undated) DEVICE — TROCAR: Brand: KII FIOS FIRST ENTRY

## (undated) DEVICE — STAPLER CANNULA SEAL: Brand: ENDOWRIST

## (undated) DEVICE — LAP AEM SCISSOR TIP .75 IN

## (undated) DEVICE — CANNULA SEAL

## (undated) DEVICE — SYRINGE 30ML LL

## (undated) DEVICE — BLADELESS OBTURATOR: Brand: WECK VISTA

## (undated) DEVICE — COVIDIEN ENDO GIA PURPLE (MED) RELOAD 60MM

## (undated) DEVICE — DRAPE EQUIPMENT RF WAND

## (undated) DEVICE — NEEDLE SPINAL18G X 3.5 IN QUINCKE

## (undated) DEVICE — COVIDIEN GIA BLACK (XTRA THICK) RELOAD

## (undated) DEVICE — ENDOPATH PNEUMONEEDLE INSUFFLATION NEEDLES WITH LUER LOCK CONNECTORS 120MM: Brand: ENDOPATH

## (undated) DEVICE — WEBRIL 6 IN UNSTERILE

## (undated) DEVICE — SEALANT FIBRIN VISTASEAL 4ML

## (undated) DEVICE — SUT MONOCRYL 4-0 PS-2 27 IN Y426H

## (undated) DEVICE — Device: Brand: DEFENDO AIR/WATER/SUCTION AND BIOPSY VALVE

## (undated) DEVICE — REDUCER: Brand: ENDOWRIST

## (undated) DEVICE — ADHESIVE SKIN CLSR DERMABOND NX

## (undated) DEVICE — PMI DISPOSABLE PUNCTURE CLOSURE DEVICE / SUTURE GRASPER: Brand: PMI

## (undated) DEVICE — PBT NON ABSORBABLE WOUND CLOSURE DEVICE: Brand: V-LOC

## (undated) DEVICE — CADIERE FORCEPS: Brand: ENDOWRIST

## (undated) DEVICE — COVIDIEN ENDO GIA PURPLE (MED) RELOAD 45MM

## (undated) DEVICE — VIOLET BRAIDED (POLYGLACTIN 910), SYNTHETIC ABSORBABLE SUTURE: Brand: COATED VICRYL

## (undated) DEVICE — URETERAL CATHETER ADAPTOR TIP

## (undated) DEVICE — REM POLYHESIVE ADULT PATIENT RETURN ELECTRODE: Brand: VALLEYLAB

## (undated) DEVICE — BLUE HEAT SCOPE WARMER

## (undated) DEVICE — CHLORAPREP HI-LITE 26ML ORANGE

## (undated) DEVICE — TIBURON LAPAROSCOPIC ABDOMINAL DRAPE: Brand: CONVERTORS

## (undated) DEVICE — [HIGH FLOW INSUFFLATOR,  DO NOT USE IF PACKAGE IS DAMAGED,  KEEP DRY,  KEEP AWAY FROM SUNLIGHT,  PROTECT FROM HEAT AND RADIOACTIVE SOURCES.]: Brand: PNEUMOSURE

## (undated) DEVICE — NEEDLE SPINAL 22G X 3.5IN  QUINCKE

## (undated) DEVICE — 3000CC GUARDIAN II: Brand: GUARDIAN

## (undated) DEVICE — SURGICAL GOWN, XL SMARTSLEEVE: Brand: CONVERTORS

## (undated) DEVICE — INTENDED FOR TISSUE SEPARATION, AND OTHER PROCEDURES THAT REQUIRE A SHARP SURGICAL BLADE TO PUNCTURE OR CUT.: Brand: BARD-PARKER SAFETY BLADES SIZE 15, STERILE

## (undated) DEVICE — DRAPE TOWEL: Brand: CONVERTORS

## (undated) DEVICE — IRRIG ENDO FLO TUBING

## (undated) DEVICE — ASTOUND STANDARD SURGICAL GOWN, XL: Brand: CONVERTORS

## (undated) DEVICE — SUT ETHIBOND 0 CT-1 30 IN X424H

## (undated) DEVICE — SYRINGE 20ML LL

## (undated) DEVICE — TUBING SUCTION 5MM X 12 FT

## (undated) DEVICE — STAPLER 60 RELOAD GREEN: Brand: SUREFORM

## (undated) DEVICE — TIP COVER ACCESSORY

## (undated) DEVICE — ARM DRAPE

## (undated) DEVICE — TUBING SMOKE EVAC W/FILTRATION DEVICE PLUMEPORT ACTIV

## (undated) DEVICE — VISIGI 3D®  CALIBRATION SYSTEM  SIZE 36FR SLEEVE/STD: Brand: BOEHRINGER® VISIGI 3D™ SLEEVE GASTRECTOMY CALIBRATION SYSTEM, SIZE 36FR

## (undated) DEVICE — STAPLER 60: Brand: SUREFORM

## (undated) DEVICE — GLOVE SRG BIOGEL 7.5

## (undated) DEVICE — STAPLER 60 RELOAD BLACK: Brand: SUREFORM

## (undated) DEVICE — ENDOPATH XCEL BLADELESS TROCARS WITH STABILITY SLEEVES: Brand: ENDOPATH XCEL

## (undated) DEVICE — SEAMGUARD STPL REINF ENDO GIA ULTRA UNIV 60 PURPLE

## (undated) DEVICE — AEM CORD

## (undated) DEVICE — 2000CC GUARDIAN II: Brand: GUARDIAN

## (undated) DEVICE — HARMONIC ACE +7 LAPAROSCOPIC SHEARS ADVANCED HEMOSTASIS 5MM DIAMETER 36CM SHAFT LENGTH  FOR USE WITH GRAY HAND PIECE ONLY: Brand: HARMONIC ACE

## (undated) DEVICE — ANTI-FOG SOLUTION WITH FOAM PAD: Brand: DEVON

## (undated) DEVICE — STAPLER GIA HANDLE STAND 4MM

## (undated) DEVICE — TRAVELKIT CONTAINS FIRST STEP KIT (200ML EP-4 KIT) AND SOILED SCOPE BAG - 1 KIT: Brand: TRAVELKIT CONTAINS FIRST STEP KIT AND SOILED SCOPE BAG

## (undated) DEVICE — GLOVE SRG BIOGEL 8

## (undated) DEVICE — 10 MM BABCOCKS WITH RATCHET HANDLES: Brand: ENDOPATH

## (undated) DEVICE — MEGA SUTURECUT ND: Brand: ENDOWRIST

## (undated) DEVICE — SEAL

## (undated) DEVICE — MONOPOLAR CURVED SCISSORS: Brand: ENDOWRIST

## (undated) DEVICE — ABSORBABLE WOUND CLOSURE DEVICE: Brand: V-LOC 90

## (undated) DEVICE — TIP-UP FENESTRATED GRASPER: Brand: ENDOWRIST

## (undated) DEVICE — VESSEL SEALER EXTEND: Brand: ENDOWRIST

## (undated) DEVICE — COLUMN DRAPE

## (undated) DEVICE — COTTON TIP APPLICTOR 2 PK

## (undated) DEVICE — STRL UNIVERSAL MINOR GENERAL: Brand: CARDINAL HEALTH

## (undated) DEVICE — SEAMGUARD STPL REINF ENDO GIA ULTRA UNV 60 BLACK

## (undated) DEVICE — STAPLER 60 RELOAD WHITE: Brand: SUREFORM

## (undated) DEVICE — ENDOPATH XCEL UNIVERSAL TROCAR STABLILITY SLEEVES: Brand: ENDOPATH XCEL

## (undated) DEVICE — BETHLEHEM UNIVERSAL MINOR GEN: Brand: CARDINAL HEALTH

## (undated) DEVICE — VISIGI 3D®  CALIBRATION SYSTEM  SIZE 36FR BYPASS/SHORT: Brand: BOEHRINGER® VISIGI 3D®  CALIBRATION SYSTEM  SIZE 36FR BYPASS/SHORT

## (undated) DEVICE — ADHESIVE SKN CLSR HISTOACRYL FLEX 0.5ML LF

## (undated) DEVICE — BAG DECANTER